# Patient Record
Sex: FEMALE | Race: WHITE | NOT HISPANIC OR LATINO | Employment: FULL TIME | URBAN - METROPOLITAN AREA
[De-identification: names, ages, dates, MRNs, and addresses within clinical notes are randomized per-mention and may not be internally consistent; named-entity substitution may affect disease eponyms.]

---

## 2018-11-05 ENCOUNTER — APPOINTMENT (OUTPATIENT)
Dept: CT IMAGING | Facility: HOSPITAL | Age: 28
End: 2018-11-05

## 2018-11-05 ENCOUNTER — HOSPITAL ENCOUNTER (INPATIENT)
Facility: HOSPITAL | Age: 28
LOS: 1 days | Discharge: HOME | End: 2018-11-07
Attending: EMERGENCY MEDICINE | Admitting: INTERNAL MEDICINE

## 2018-11-05 DIAGNOSIS — J18.9 PNEUMONIA OF BOTH LUNGS DUE TO INFECTIOUS ORGANISM, UNSPECIFIED PART OF LUNG: Primary | ICD-10-CM

## 2018-11-05 DIAGNOSIS — S20.219A CONTUSION OF CHEST WALL, UNSPECIFIED LATERALITY, INITIAL ENCOUNTER: ICD-10-CM

## 2018-11-05 PROCEDURE — 93010 ELECTROCARDIOGRAM REPORT: CPT | Performed by: INTERNAL MEDICINE

## 2018-11-05 PROCEDURE — 93005 ELECTROCARDIOGRAM TRACING: CPT | Performed by: PHYSICIAN ASSISTANT

## 2018-11-05 PROCEDURE — 70450 CT HEAD/BRAIN W/O DYE: CPT

## 2018-11-05 PROCEDURE — 99285 EMERGENCY DEPT VISIT HI MDM: CPT

## 2018-11-05 RX ORDER — CYCLOBENZAPRINE HCL 5 MG
5 TABLET ORAL 3 TIMES DAILY PRN
COMMUNITY
End: 2019-04-18

## 2018-11-05 RX ORDER — PREDNISONE 1 MG/1
5 TABLET ORAL DAILY
COMMUNITY
End: 2021-04-09 | Stop reason: HOSPADM

## 2018-11-05 RX ORDER — CLONAZEPAM 0.5 MG/1
0.5 TABLET ORAL 2 TIMES DAILY PRN
COMMUNITY
End: 2022-01-26

## 2018-11-05 RX ORDER — ACETAMINOPHEN 500 MG
1000 TABLET ORAL ONCE
Status: COMPLETED | OUTPATIENT
Start: 2018-11-05 | End: 2018-11-05

## 2018-11-05 RX ORDER — LEVOMEFOLATE/ALGAL OIL 7.5-90.314
1 CAPSULE ORAL DAILY
COMMUNITY

## 2018-11-05 RX ORDER — DESVENLAFAXINE 100 MG/1
100 TABLET, EXTENDED RELEASE ORAL DAILY
COMMUNITY
End: 2022-01-26

## 2018-11-05 RX ORDER — ONDANSETRON 4 MG/1
4 TABLET, ORALLY DISINTEGRATING ORAL ONCE
Status: COMPLETED | OUTPATIENT
Start: 2018-11-05 | End: 2018-11-05

## 2018-11-05 RX ADMIN — ACETAMINOPHEN 1000 MG: 500 TABLET, FILM COATED ORAL at 22:59

## 2018-11-05 RX ADMIN — ONDANSETRON 4 MG: 4 TABLET, ORALLY DISINTEGRATING ORAL at 22:59

## 2018-11-06 ENCOUNTER — APPOINTMENT (OUTPATIENT)
Dept: CT IMAGING | Facility: HOSPITAL | Age: 28
End: 2018-11-06

## 2018-11-06 PROBLEM — G43.009 MIGRAINE WITHOUT AURA: Status: ACTIVE | Noted: 2018-11-06

## 2018-11-06 PROBLEM — E66.01 CLASS 3 SEVERE OBESITY DUE TO EXCESS CALORIES IN ADULT (HCC): Status: ACTIVE | Noted: 2018-11-06

## 2018-11-06 PROBLEM — J18.9 PNEUMONIA OF BOTH LUNGS DUE TO INFECTIOUS ORGANISM: Status: ACTIVE | Noted: 2018-11-06

## 2018-11-06 PROBLEM — J45.909 ASTHMA: Status: ACTIVE | Noted: 2018-11-06

## 2018-11-06 LAB
ALBUMIN SERPL-MCNC: 3.6 G/DL (ref 3.5–5.2)
ALBUMIN/GLOB SERPL: 1.2 G/DL
ALP SERPL-CCNC: 107 U/L (ref 39–117)
ALT SERPL W P-5'-P-CCNC: 17 U/L (ref 1–33)
ANION GAP SERPL CALCULATED.3IONS-SCNC: 11.4 MMOL/L
AST SERPL-CCNC: 11 U/L (ref 1–32)
B PERT DNA SPEC QL NAA+PROBE: NOT DETECTED
BASOPHILS # BLD AUTO: 0.03 10*3/MM3 (ref 0–0.2)
BASOPHILS NFR BLD AUTO: 0.2 % (ref 0–1.5)
BILIRUB SERPL-MCNC: 0.3 MG/DL (ref 0.1–1.2)
BUN BLD-MCNC: 11 MG/DL (ref 6–20)
BUN/CREAT SERPL: 16.9 (ref 7–25)
C PNEUM DNA NPH QL NAA+NON-PROBE: NOT DETECTED
CALCIUM SPEC-SCNC: 9.5 MG/DL (ref 8.6–10.5)
CHLORIDE SERPL-SCNC: 106 MMOL/L (ref 98–107)
CO2 SERPL-SCNC: 22.6 MMOL/L (ref 22–29)
CREAT BLD-MCNC: 0.65 MG/DL (ref 0.57–1)
D-LACTATE SERPL-SCNC: 1 MMOL/L (ref 0.5–2)
DEPRECATED RDW RBC AUTO: 44.1 FL (ref 37–54)
EOSINOPHIL # BLD AUTO: 0.4 10*3/MM3 (ref 0–0.7)
EOSINOPHIL NFR BLD AUTO: 2.4 % (ref 0.3–6.2)
ERYTHROCYTE [DISTWIDTH] IN BLOOD BY AUTOMATED COUNT: 15.6 % (ref 11.7–13)
FLUAV H1 2009 PAND RNA NPH QL NAA+PROBE: NOT DETECTED
FLUAV H1 HA GENE NPH QL NAA+PROBE: NOT DETECTED
FLUAV H3 RNA NPH QL NAA+PROBE: NOT DETECTED
FLUAV SUBTYP SPEC NAA+PROBE: NOT DETECTED
FLUBV RNA ISLT QL NAA+PROBE: NOT DETECTED
GFR SERPL CREATININE-BSD FRML MDRD: 110 ML/MIN/1.73
GLOBULIN UR ELPH-MCNC: 3.1 GM/DL
GLUCOSE BLD-MCNC: 96 MG/DL (ref 65–99)
GLUCOSE BLDC GLUCOMTR-MCNC: 113 MG/DL (ref 70–130)
HADV DNA SPEC NAA+PROBE: NOT DETECTED
HCOV 229E RNA SPEC QL NAA+PROBE: NOT DETECTED
HCOV HKU1 RNA SPEC QL NAA+PROBE: NOT DETECTED
HCOV NL63 RNA SPEC QL NAA+PROBE: NOT DETECTED
HCOV OC43 RNA SPEC QL NAA+PROBE: NOT DETECTED
HCT VFR BLD AUTO: 38.3 % (ref 35.6–45.5)
HGB BLD-MCNC: 12.3 G/DL (ref 11.9–15.5)
HMPV RNA NPH QL NAA+NON-PROBE: NOT DETECTED
HPIV1 RNA SPEC QL NAA+PROBE: NOT DETECTED
HPIV2 RNA SPEC QL NAA+PROBE: NOT DETECTED
HPIV3 RNA NPH QL NAA+PROBE: NOT DETECTED
HPIV4 P GENE NPH QL NAA+PROBE: NOT DETECTED
IMM GRANULOCYTES # BLD: 0.11 10*3/MM3 (ref 0–0.03)
IMM GRANULOCYTES NFR BLD: 0.7 % (ref 0–0.5)
L PNEUMO1 AG UR QL IA: NEGATIVE
LYMPHOCYTES # BLD AUTO: 4.76 10*3/MM3 (ref 0.9–4.8)
LYMPHOCYTES NFR BLD AUTO: 29.1 % (ref 19.6–45.3)
M PNEUMO IGG SER IA-ACNC: NOT DETECTED
MCH RBC QN AUTO: 24.9 PG (ref 26.9–32)
MCHC RBC AUTO-ENTMCNC: 32.1 G/DL (ref 32.4–36.3)
MCV RBC AUTO: 77.7 FL (ref 80.5–98.2)
MONOCYTES # BLD AUTO: 0.78 10*3/MM3 (ref 0.2–1.2)
MONOCYTES NFR BLD AUTO: 4.8 % (ref 5–12)
NEUTROPHILS # BLD AUTO: 10.38 10*3/MM3 (ref 1.9–8.1)
NEUTROPHILS NFR BLD AUTO: 63.5 % (ref 42.7–76)
PLATELET # BLD AUTO: 305 10*3/MM3 (ref 140–500)
PMV BLD AUTO: 8.5 FL (ref 6–12)
POTASSIUM BLD-SCNC: 4.2 MMOL/L (ref 3.5–5.2)
PROT SERPL-MCNC: 6.7 G/DL (ref 6–8.5)
RBC # BLD AUTO: 4.93 10*6/MM3 (ref 3.9–5.2)
RHINOVIRUS RNA SPEC NAA+PROBE: NOT DETECTED
RSV RNA NPH QL NAA+NON-PROBE: NOT DETECTED
S PNEUM AG SPEC QL LA: NEGATIVE
SODIUM BLD-SCNC: 140 MMOL/L (ref 136–145)
WBC NRBC COR # BLD: 16.35 10*3/MM3 (ref 4.5–10.7)

## 2018-11-06 PROCEDURE — 25010000002 ONDANSETRON PER 1 MG: Performed by: EMERGENCY MEDICINE

## 2018-11-06 PROCEDURE — 87633 RESP VIRUS 12-25 TARGETS: CPT | Performed by: NURSE PRACTITIONER

## 2018-11-06 PROCEDURE — 87899 AGENT NOS ASSAY W/OPTIC: CPT | Performed by: NURSE PRACTITIONER

## 2018-11-06 PROCEDURE — 87486 CHLMYD PNEUM DNA AMP PROBE: CPT | Performed by: NURSE PRACTITIONER

## 2018-11-06 PROCEDURE — 85025 COMPLETE CBC W/AUTO DIFF WBC: CPT | Performed by: EMERGENCY MEDICINE

## 2018-11-06 PROCEDURE — 25010000002 LEVOFLOXACIN PER 250 MG: Performed by: EMERGENCY MEDICINE

## 2018-11-06 PROCEDURE — 83605 ASSAY OF LACTIC ACID: CPT | Performed by: EMERGENCY MEDICINE

## 2018-11-06 PROCEDURE — 94799 UNLISTED PULMONARY SVC/PX: CPT

## 2018-11-06 PROCEDURE — 87581 M.PNEUMON DNA AMP PROBE: CPT | Performed by: NURSE PRACTITIONER

## 2018-11-06 PROCEDURE — 25010000002 HYDROMORPHONE PER 4 MG: Performed by: EMERGENCY MEDICINE

## 2018-11-06 PROCEDURE — 87798 DETECT AGENT NOS DNA AMP: CPT | Performed by: NURSE PRACTITIONER

## 2018-11-06 PROCEDURE — 71250 CT THORAX DX C-: CPT

## 2018-11-06 PROCEDURE — 87205 SMEAR GRAM STAIN: CPT | Performed by: NURSE PRACTITIONER

## 2018-11-06 PROCEDURE — 87040 BLOOD CULTURE FOR BACTERIA: CPT | Performed by: EMERGENCY MEDICINE

## 2018-11-06 PROCEDURE — 80053 COMPREHEN METABOLIC PANEL: CPT | Performed by: EMERGENCY MEDICINE

## 2018-11-06 PROCEDURE — 25010000002 ONDANSETRON PER 1 MG: Performed by: NURSE PRACTITIONER

## 2018-11-06 PROCEDURE — 87070 CULTURE OTHR SPECIMN AEROBIC: CPT | Performed by: NURSE PRACTITIONER

## 2018-11-06 PROCEDURE — 63710000001 PREDNISONE PER 5 MG: Performed by: INTERNAL MEDICINE

## 2018-11-06 PROCEDURE — 94640 AIRWAY INHALATION TREATMENT: CPT

## 2018-11-06 PROCEDURE — 82962 GLUCOSE BLOOD TEST: CPT

## 2018-11-06 RX ORDER — IPRATROPIUM BROMIDE AND ALBUTEROL SULFATE 2.5; .5 MG/3ML; MG/3ML
3 SOLUTION RESPIRATORY (INHALATION) EVERY 4 HOURS PRN
Status: DISCONTINUED | OUTPATIENT
Start: 2018-11-06 | End: 2018-11-07 | Stop reason: HOSPADM

## 2018-11-06 RX ORDER — MONTELUKAST SODIUM 10 MG/1
10 TABLET ORAL NIGHTLY
Status: DISCONTINUED | OUTPATIENT
Start: 2018-11-06 | End: 2018-11-07 | Stop reason: HOSPADM

## 2018-11-06 RX ORDER — NITROGLYCERIN 0.4 MG/1
0.4 TABLET SUBLINGUAL
Status: DISCONTINUED | OUTPATIENT
Start: 2018-11-06 | End: 2018-11-07 | Stop reason: HOSPADM

## 2018-11-06 RX ORDER — HYDROMORPHONE HYDROCHLORIDE 1 MG/ML
0.5 INJECTION, SOLUTION INTRAMUSCULAR; INTRAVENOUS; SUBCUTANEOUS ONCE
Status: COMPLETED | OUTPATIENT
Start: 2018-11-06 | End: 2018-11-06

## 2018-11-06 RX ORDER — ONDANSETRON 2 MG/ML
4 INJECTION INTRAMUSCULAR; INTRAVENOUS ONCE
Status: COMPLETED | OUTPATIENT
Start: 2018-11-06 | End: 2018-11-06

## 2018-11-06 RX ORDER — PREDNISONE 1 MG/1
5 TABLET ORAL DAILY
Status: DISCONTINUED | OUTPATIENT
Start: 2018-11-06 | End: 2018-11-07 | Stop reason: HOSPADM

## 2018-11-06 RX ORDER — CYCLOBENZAPRINE HCL 10 MG
5 TABLET ORAL 3 TIMES DAILY PRN
Status: DISCONTINUED | OUTPATIENT
Start: 2018-11-06 | End: 2018-11-07 | Stop reason: HOSPADM

## 2018-11-06 RX ORDER — DESVENLAFAXINE SUCCINATE 50 MG/1
100 TABLET, EXTENDED RELEASE ORAL DAILY
Status: DISCONTINUED | OUTPATIENT
Start: 2018-11-06 | End: 2018-11-07 | Stop reason: HOSPADM

## 2018-11-06 RX ORDER — BUTALBITAL, ACETAMINOPHEN AND CAFFEINE 50; 325; 40 MG/1; MG/1; MG/1
1 TABLET ORAL EVERY 4 HOURS PRN
Status: DISCONTINUED | OUTPATIENT
Start: 2018-11-06 | End: 2018-11-07 | Stop reason: HOSPADM

## 2018-11-06 RX ORDER — SODIUM CHLORIDE 0.9 % (FLUSH) 0.9 %
1-10 SYRINGE (ML) INJECTION AS NEEDED
Status: DISCONTINUED | OUTPATIENT
Start: 2018-11-06 | End: 2018-11-07 | Stop reason: HOSPADM

## 2018-11-06 RX ORDER — CLONAZEPAM 0.5 MG/1
0.5 TABLET ORAL 2 TIMES DAILY PRN
Status: DISCONTINUED | OUTPATIENT
Start: 2018-11-06 | End: 2018-11-07 | Stop reason: HOSPADM

## 2018-11-06 RX ORDER — ACETAMINOPHEN 325 MG/1
650 TABLET ORAL EVERY 4 HOURS PRN
Status: DISCONTINUED | OUTPATIENT
Start: 2018-11-06 | End: 2018-11-07 | Stop reason: HOSPADM

## 2018-11-06 RX ORDER — ONDANSETRON 2 MG/ML
4 INJECTION INTRAMUSCULAR; INTRAVENOUS EVERY 6 HOURS PRN
Status: DISCONTINUED | OUTPATIENT
Start: 2018-11-06 | End: 2018-11-07 | Stop reason: HOSPADM

## 2018-11-06 RX ORDER — SODIUM CHLORIDE 0.9 % (FLUSH) 0.9 %
3 SYRINGE (ML) INJECTION EVERY 12 HOURS SCHEDULED
Status: DISCONTINUED | OUTPATIENT
Start: 2018-11-06 | End: 2018-11-07 | Stop reason: HOSPADM

## 2018-11-06 RX ORDER — ONDANSETRON 4 MG/1
4 TABLET, FILM COATED ORAL EVERY 6 HOURS PRN
Status: DISCONTINUED | OUTPATIENT
Start: 2018-11-06 | End: 2018-11-07 | Stop reason: HOSPADM

## 2018-11-06 RX ORDER — BUDESONIDE AND FORMOTEROL FUMARATE DIHYDRATE 160; 4.5 UG/1; UG/1
2 AEROSOL RESPIRATORY (INHALATION)
Status: DISCONTINUED | OUTPATIENT
Start: 2018-11-06 | End: 2018-11-07 | Stop reason: HOSPADM

## 2018-11-06 RX ORDER — ONDANSETRON 4 MG/1
4 TABLET, ORALLY DISINTEGRATING ORAL EVERY 6 HOURS PRN
Status: DISCONTINUED | OUTPATIENT
Start: 2018-11-06 | End: 2018-11-07 | Stop reason: HOSPADM

## 2018-11-06 RX ORDER — LEVOFLOXACIN 5 MG/ML
750 INJECTION, SOLUTION INTRAVENOUS EVERY 24 HOURS
Status: DISCONTINUED | OUTPATIENT
Start: 2018-11-07 | End: 2018-11-07 | Stop reason: HOSPADM

## 2018-11-06 RX ORDER — ALBUTEROL SULFATE 2.5 MG/3ML
2.5 SOLUTION RESPIRATORY (INHALATION) EVERY 6 HOURS PRN
Status: DISCONTINUED | OUTPATIENT
Start: 2018-11-06 | End: 2018-11-07 | Stop reason: HOSPADM

## 2018-11-06 RX ORDER — IPRATROPIUM BROMIDE AND ALBUTEROL SULFATE 2.5; .5 MG/3ML; MG/3ML
3 SOLUTION RESPIRATORY (INHALATION) ONCE
Status: COMPLETED | OUTPATIENT
Start: 2018-11-06 | End: 2018-11-06

## 2018-11-06 RX ORDER — LEVOFLOXACIN 5 MG/ML
750 INJECTION, SOLUTION INTRAVENOUS ONCE
Status: COMPLETED | OUTPATIENT
Start: 2018-11-06 | End: 2018-11-06

## 2018-11-06 RX ADMIN — BUTALBITAL, ACETAMINOPHEN, AND CAFFEINE 1 TABLET: 50; 325; 40 TABLET ORAL at 20:28

## 2018-11-06 RX ADMIN — MONTELUKAST SODIUM 10 MG: 10 TABLET, FILM COATED ORAL at 20:28

## 2018-11-06 RX ADMIN — IPRATROPIUM BROMIDE AND ALBUTEROL SULFATE 3 ML: 2.5; .5 SOLUTION RESPIRATORY (INHALATION) at 15:55

## 2018-11-06 RX ADMIN — BUTALBITAL, ACETAMINOPHEN, AND CAFFEINE 1 TABLET: 50; 325; 40 TABLET ORAL at 10:58

## 2018-11-06 RX ADMIN — Medication 3 ML: at 10:08

## 2018-11-06 RX ADMIN — BUDESONIDE AND FORMOTEROL FUMARATE DIHYDRATE 2 PUFF: 160; 4.5 AEROSOL RESPIRATORY (INHALATION) at 20:17

## 2018-11-06 RX ADMIN — CLONAZEPAM 0.5 MG: 0.5 TABLET ORAL at 20:28

## 2018-11-06 RX ADMIN — BUTALBITAL, ACETAMINOPHEN, AND CAFFEINE 1 TABLET: 50; 325; 40 TABLET ORAL at 15:11

## 2018-11-06 RX ADMIN — IPRATROPIUM BROMIDE AND ALBUTEROL SULFATE 3 ML: .5; 3 SOLUTION RESPIRATORY (INHALATION) at 01:24

## 2018-11-06 RX ADMIN — LEVOFLOXACIN 750 MG: 5 INJECTION, SOLUTION INTRAVENOUS at 02:05

## 2018-11-06 RX ADMIN — DESVENLAFAXINE SUCCINATE 100 MG: 50 TABLET, EXTENDED RELEASE ORAL at 16:13

## 2018-11-06 RX ADMIN — IPRATROPIUM BROMIDE AND ALBUTEROL SULFATE 3 ML: 2.5; .5 SOLUTION RESPIRATORY (INHALATION) at 20:17

## 2018-11-06 RX ADMIN — ACETAMINOPHEN 650 MG: 325 TABLET, FILM COATED ORAL at 07:35

## 2018-11-06 RX ADMIN — PREDNISONE 5 MG: 5 TABLET ORAL at 16:13

## 2018-11-06 RX ADMIN — ONDANSETRON 4 MG: 2 INJECTION INTRAMUSCULAR; INTRAVENOUS at 20:41

## 2018-11-06 RX ADMIN — HYDROMORPHONE HYDROCHLORIDE 0.5 MG: 1 INJECTION, SOLUTION INTRAMUSCULAR; INTRAVENOUS; SUBCUTANEOUS at 03:24

## 2018-11-06 RX ADMIN — HYDROMORPHONE HYDROCHLORIDE 0.5 MG: 1 INJECTION, SOLUTION INTRAMUSCULAR; INTRAVENOUS; SUBCUTANEOUS at 01:37

## 2018-11-06 RX ADMIN — Medication 3 ML: at 23:07

## 2018-11-06 RX ADMIN — ONDANSETRON 4 MG: 2 INJECTION INTRAMUSCULAR; INTRAVENOUS at 10:25

## 2018-11-06 RX ADMIN — ONDANSETRON 4 MG: 2 INJECTION INTRAMUSCULAR; INTRAVENOUS at 01:37

## 2018-11-07 VITALS
RESPIRATION RATE: 20 BRPM | HEART RATE: 89 BPM | HEIGHT: 67 IN | WEIGHT: 293 LBS | TEMPERATURE: 98.7 F | SYSTOLIC BLOOD PRESSURE: 119 MMHG | BODY MASS INDEX: 45.99 KG/M2 | OXYGEN SATURATION: 97 % | DIASTOLIC BLOOD PRESSURE: 72 MMHG

## 2018-11-07 LAB
ANION GAP SERPL CALCULATED.3IONS-SCNC: 8.8 MMOL/L
BASOPHILS # BLD AUTO: 0.05 10*3/MM3 (ref 0–0.2)
BASOPHILS NFR BLD AUTO: 0.3 % (ref 0–1.5)
BUN BLD-MCNC: 10 MG/DL (ref 6–20)
BUN/CREAT SERPL: 17.2 (ref 7–25)
CALCIUM SPEC-SCNC: 9 MG/DL (ref 8.6–10.5)
CHLORIDE SERPL-SCNC: 106 MMOL/L (ref 98–107)
CO2 SERPL-SCNC: 24.2 MMOL/L (ref 22–29)
CREAT BLD-MCNC: 0.58 MG/DL (ref 0.57–1)
DEPRECATED RDW RBC AUTO: 46.7 FL (ref 37–54)
EOSINOPHIL # BLD AUTO: 0.35 10*3/MM3 (ref 0–0.7)
EOSINOPHIL NFR BLD AUTO: 2.4 % (ref 0.3–6.2)
ERYTHROCYTE [DISTWIDTH] IN BLOOD BY AUTOMATED COUNT: 15.5 % (ref 11.7–13)
GFR SERPL CREATININE-BSD FRML MDRD: 126 ML/MIN/1.73
GLUCOSE BLD-MCNC: 87 MG/DL (ref 65–99)
HCT VFR BLD AUTO: 40.8 % (ref 35.6–45.5)
HGB BLD-MCNC: 11.9 G/DL (ref 11.9–15.5)
IMM GRANULOCYTES # BLD: 0.06 10*3/MM3 (ref 0–0.03)
IMM GRANULOCYTES NFR BLD: 0.4 % (ref 0–0.5)
LYMPHOCYTES # BLD AUTO: 3.53 10*3/MM3 (ref 0.9–4.8)
LYMPHOCYTES NFR BLD AUTO: 24.2 % (ref 19.6–45.3)
MCH RBC QN AUTO: 24.2 PG (ref 26.9–32)
MCHC RBC AUTO-ENTMCNC: 29.2 G/DL (ref 32.4–36.3)
MCV RBC AUTO: 83.1 FL (ref 80.5–98.2)
MONOCYTES # BLD AUTO: 0.97 10*3/MM3 (ref 0.2–1.2)
MONOCYTES NFR BLD AUTO: 6.6 % (ref 5–12)
NEUTROPHILS # BLD AUTO: 9.65 10*3/MM3 (ref 1.9–8.1)
NEUTROPHILS NFR BLD AUTO: 66.1 % (ref 42.7–76)
PLATELET # BLD AUTO: 293 10*3/MM3 (ref 140–500)
PMV BLD AUTO: 8.8 FL (ref 6–12)
POTASSIUM BLD-SCNC: 4.3 MMOL/L (ref 3.5–5.2)
RBC # BLD AUTO: 4.91 10*6/MM3 (ref 3.9–5.2)
SODIUM BLD-SCNC: 139 MMOL/L (ref 136–145)
WBC NRBC COR # BLD: 14.61 10*3/MM3 (ref 4.5–10.7)

## 2018-11-07 PROCEDURE — 80048 BASIC METABOLIC PNL TOTAL CA: CPT | Performed by: INTERNAL MEDICINE

## 2018-11-07 PROCEDURE — 85025 COMPLETE CBC W/AUTO DIFF WBC: CPT | Performed by: INTERNAL MEDICINE

## 2018-11-07 PROCEDURE — G0009 ADMIN PNEUMOCOCCAL VACCINE: HCPCS | Performed by: INTERNAL MEDICINE

## 2018-11-07 PROCEDURE — 25010000002 PNEUMOCOCCAL VAC POLYVALENT PER 0.5 ML: Performed by: INTERNAL MEDICINE

## 2018-11-07 PROCEDURE — 90661 CCIIV3 VAC ABX FR 0.5 ML IM: CPT | Performed by: INTERNAL MEDICINE

## 2018-11-07 PROCEDURE — 63710000001 PREDNISONE PER 5 MG: Performed by: INTERNAL MEDICINE

## 2018-11-07 PROCEDURE — 94799 UNLISTED PULMONARY SVC/PX: CPT

## 2018-11-07 PROCEDURE — 25010000002 INFLUENZA VAC SUBUNIT QUAD 0.5 ML SUSPENSION PREFILLED SYRINGE: Performed by: INTERNAL MEDICINE

## 2018-11-07 PROCEDURE — 90732 PPSV23 VACC 2 YRS+ SUBQ/IM: CPT | Performed by: INTERNAL MEDICINE

## 2018-11-07 PROCEDURE — 25010000002 LEVOFLOXACIN PER 250 MG: Performed by: NURSE PRACTITIONER

## 2018-11-07 PROCEDURE — G0008 ADMIN INFLUENZA VIRUS VAC: HCPCS | Performed by: INTERNAL MEDICINE

## 2018-11-07 RX ORDER — BUTALBITAL, ACETAMINOPHEN AND CAFFEINE 50; 325; 40 MG/1; MG/1; MG/1
1 TABLET ORAL EVERY 6 HOURS PRN
Qty: 10 TABLET | Refills: 0 | Status: SHIPPED | OUTPATIENT
Start: 2018-11-07 | End: 2021-12-20

## 2018-11-07 RX ORDER — LEVOFLOXACIN 750 MG/1
750 TABLET ORAL DAILY
Qty: 7 TABLET | Refills: 0 | Status: SHIPPED | OUTPATIENT
Start: 2018-11-07 | End: 2021-04-09 | Stop reason: HOSPADM

## 2018-11-07 RX ADMIN — BUTALBITAL, ACETAMINOPHEN, AND CAFFEINE 1 TABLET: 50; 325; 40 TABLET ORAL at 08:33

## 2018-11-07 RX ADMIN — PREDNISONE 5 MG: 5 TABLET ORAL at 08:33

## 2018-11-07 RX ADMIN — Medication 3 ML: at 08:45

## 2018-11-07 RX ADMIN — CLONAZEPAM 0.5 MG: 0.5 TABLET ORAL at 08:35

## 2018-11-07 RX ADMIN — LEVOFLOXACIN 750 MG: 5 INJECTION, SOLUTION INTRAVENOUS at 03:42

## 2018-11-07 RX ADMIN — DESVENLAFAXINE SUCCINATE 100 MG: 50 TABLET, EXTENDED RELEASE ORAL at 08:33

## 2018-11-07 RX ADMIN — BUTALBITAL, ACETAMINOPHEN, AND CAFFEINE 1 TABLET: 50; 325; 40 TABLET ORAL at 00:59

## 2018-11-07 RX ADMIN — PNEUMOCOCCAL VACCINE POLYVALENT 0.5 ML
25; 25; 25; 25; 25; 25; 25; 25; 25; 25; 25; 25; 25; 25; 25; 25; 25; 25; 25; 25; 25; 25; 25 INJECTION, SOLUTION INTRAMUSCULAR; SUBCUTANEOUS at 11:24

## 2018-11-07 RX ADMIN — BUDESONIDE AND FORMOTEROL FUMARATE DIHYDRATE 2 PUFF: 160; 4.5 AEROSOL RESPIRATORY (INHALATION) at 09:20

## 2018-11-07 RX ADMIN — CYCLOBENZAPRINE 5 MG: 10 TABLET, FILM COATED ORAL at 00:59

## 2018-11-07 RX ADMIN — INFLUENZA A VIRUS A/SINGAPORE/GP1908/2015 IVR-180 (H1N1) ANTIGEN (MDCK CELL DERIVED, PROPIOLACTONE INACTIVATED), INFLUENZA A VIRUS A/NORTH CAROLINA/04/2016 (H3N2) HEMAGGLUTININ ANTIGEN (MDCK CELL DERIVED, PROPIOLACTONE INACTIVATED), INFLUENZA B VIRUS B/IOWA/06/2017 HEMAGGLUTININ ANTIGEN (MDCK CELL DERIVED, PROPIOLACTONE INACTIVATED), INFLUENZA B VIRUS B/SINGAPORE/INFTT-16-0610/2016 HEMAGGLUTININ ANTIGEN (MDCK CELL DERIVED, PROPIOLACTONE INACTIVATED) 0.5 ML: 15; 15; 15; 15 INJECTION, SUSPENSION INTRAMUSCULAR at 11:23

## 2018-11-08 ENCOUNTER — READMISSION MANAGEMENT (OUTPATIENT)
Dept: CALL CENTER | Facility: HOSPITAL | Age: 28
End: 2018-11-08

## 2018-11-08 LAB
BACTERIA SPEC RESP CULT: NORMAL
GRAM STN SPEC: NORMAL

## 2018-11-09 ENCOUNTER — READMISSION MANAGEMENT (OUTPATIENT)
Dept: CALL CENTER | Facility: HOSPITAL | Age: 28
End: 2018-11-09

## 2018-11-09 NOTE — OUTREACH NOTE
COPD/PN Week 1 Survey      Responses   Facility patient discharged from?  [facility]   Does the patient have one of the following disease processes/diagnoses(primary or secondary)?  COPD/Pneumonia   Is there a successful TCM telephone encounter documented?  No   Was the primary reason for admission:  Pneumonia   Week 1 attempt successful?  Yes   Call start time  1724   Call end time  1726   Discharge diagnosis  Pneumonia  Asthma  Migraine without aura    Meds reviewed with patient/caregiver?  Yes   Is the patient having any side effects they believe may be caused by any medication additions or changes?  No   Does the patient have all medications ordered at discharge?  Yes   Is the patient taking all medications as directed (includes completed medication regime)?  Yes   Does the patient have an appointment with their PCP or pulmonologist within 7 days of discharge?  Yes   Comments regarding PCP  Sees PCP on Moonday 11/13/2018   Has the patient kept scheduled appointments due by today?  N/A   Has home health visited the patient within 72 hours of discharge?  N/A   Psychosocial issues?  No   Comments  Patient reports she is doing well. No SOB, or fever.    Did the patient receive a copy of their discharge instructions?  Yes   Nursing interventions  Reviewed instructions with patient   What is the patient's perception of their health status since discharge?  Improving   Nursing Interventions  Nurse provided patient education   Are the patient's immunizations up to date?   Yes   Nursing interventions  Educated on importance of maintaining up to date immunizations as advised by provider   Is the patient/caregiver able to teach back the hierarchy of who to call/visit for symptoms/problems? PCP, Specialist, Home health nurse, Urgent Care, ED, 911  Yes   Is the patient/caregiver able to teach back signs and symptoms of worsening condition:  Fever/chills, Shortness of breath, Chest pain   Is the patient/caregiver able to  teach back importance of completing antibiotic course of treatment?  Yes   Week 1 call completed?  Yes          Sacha Mitchell RN

## 2018-11-09 NOTE — OUTREACH NOTE
Prep Survey      Responses   Facility patient discharged from?  [facility]   Is patient eligible?  Yes   Discharge diagnosis  Pneumonia  Asthma  Migraine without aura    Does the patient have one of the following disease processes/diagnoses(primary or secondary)?  COPD/Pneumonia   Does the patient have Home health ordered?  No   Is there a DME ordered?  No   Prep survey completed?  Yes          Khloe Clay RN

## 2018-11-11 LAB
BACTERIA SPEC AEROBE CULT: NORMAL
BACTERIA SPEC AEROBE CULT: NORMAL

## 2018-11-17 ENCOUNTER — READMISSION MANAGEMENT (OUTPATIENT)
Dept: CALL CENTER | Facility: HOSPITAL | Age: 28
End: 2018-11-17

## 2019-04-17 ENCOUNTER — HOSPITAL ENCOUNTER (EMERGENCY)
Facility: HOSPITAL | Age: 29
Discharge: HOME | End: 2019-04-18
Attending: EMERGENCY MEDICINE | Admitting: EMERGENCY MEDICINE

## 2019-04-17 ENCOUNTER — APPOINTMENT (OUTPATIENT)
Dept: GENERAL RADIOLOGY | Facility: HOSPITAL | Age: 29
End: 2019-04-17

## 2019-04-17 DIAGNOSIS — M54.41 ACUTE BILATERAL LOW BACK PAIN WITH RIGHT-SIDED SCIATICA: Primary | ICD-10-CM

## 2019-04-17 PROCEDURE — 99282 EMERGENCY DEPT VISIT SF MDM: CPT

## 2019-04-17 PROCEDURE — 72110 X-RAY EXAM L-2 SPINE 4/>VWS: CPT

## 2019-04-18 VITALS
BODY MASS INDEX: 42.38 KG/M2 | OXYGEN SATURATION: 96 % | RESPIRATION RATE: 16 BRPM | HEART RATE: 68 BPM | TEMPERATURE: 98.1 F | DIASTOLIC BLOOD PRESSURE: 84 MMHG | HEIGHT: 67 IN | SYSTOLIC BLOOD PRESSURE: 144 MMHG | WEIGHT: 270 LBS

## 2019-04-18 RX ORDER — HYDROCODONE BITARTRATE AND ACETAMINOPHEN 7.5; 325 MG/1; MG/1
1 TABLET ORAL EVERY 4 HOURS PRN
Qty: 18 TABLET | Refills: 0 | Status: SHIPPED | OUTPATIENT
Start: 2019-04-18 | End: 2021-11-22

## 2019-04-18 RX ORDER — PREDNISONE 50 MG/1
50 TABLET ORAL DAILY
Qty: 5 TABLET | Refills: 0 | Status: SHIPPED | OUTPATIENT
Start: 2019-04-18 | End: 2021-04-09 | Stop reason: HOSPADM

## 2019-04-18 RX ORDER — METAXALONE 800 MG/1
800 TABLET ORAL 3 TIMES DAILY PRN
Qty: 15 TABLET | Refills: 0 | Status: SHIPPED | OUTPATIENT
Start: 2019-04-18 | End: 2022-03-16

## 2021-04-07 ENCOUNTER — APPOINTMENT (OUTPATIENT)
Dept: CT IMAGING | Facility: HOSPITAL | Age: 31
End: 2021-04-07

## 2021-04-07 ENCOUNTER — APPOINTMENT (OUTPATIENT)
Dept: GENERAL RADIOLOGY | Facility: HOSPITAL | Age: 31
End: 2021-04-07

## 2021-04-07 ENCOUNTER — APPOINTMENT (OUTPATIENT)
Dept: MRI IMAGING | Facility: HOSPITAL | Age: 31
End: 2021-04-07

## 2021-04-07 ENCOUNTER — HOSPITAL ENCOUNTER (INPATIENT)
Facility: HOSPITAL | Age: 31
LOS: 2 days | Discharge: HOME | End: 2021-04-09
Attending: EMERGENCY MEDICINE | Admitting: INTERNAL MEDICINE

## 2021-04-07 DIAGNOSIS — R29.898 RIGHT ARM WEAKNESS: ICD-10-CM

## 2021-04-07 DIAGNOSIS — R47.1 DYSARTHRIA: ICD-10-CM

## 2021-04-07 DIAGNOSIS — I63.9 CEREBROVASCULAR ACCIDENT (CVA), UNSPECIFIED MECHANISM (HCC): Primary | ICD-10-CM

## 2021-04-07 DIAGNOSIS — R29.898 RIGHT LEG WEAKNESS: ICD-10-CM

## 2021-04-07 LAB
ABO GROUP BLD: NORMAL
ALBUMIN SERPL-MCNC: 3.6 G/DL (ref 3.5–5.2)
ALBUMIN/GLOB SERPL: 2 G/DL
ALP SERPL-CCNC: 57 U/L (ref 39–117)
ALT SERPL W P-5'-P-CCNC: 15 U/L (ref 1–33)
ANION GAP SERPL CALCULATED.3IONS-SCNC: 6.9 MMOL/L (ref 5–15)
APTT PPP: 27.5 SECONDS (ref 22.7–35.4)
AST SERPL-CCNC: 18 U/L (ref 1–32)
BASOPHILS # BLD AUTO: 0.03 10*3/MM3 (ref 0–0.2)
BASOPHILS NFR BLD AUTO: 0.7 % (ref 0–1.5)
BILIRUB SERPL-MCNC: 0.5 MG/DL (ref 0–1.2)
BLD GP AB SCN SERPL QL: NEGATIVE
BUN SERPL-MCNC: 8 MG/DL (ref 6–20)
BUN/CREAT SERPL: 15.1 (ref 7–25)
CALCIUM SPEC-SCNC: 8 MG/DL (ref 8.6–10.5)
CHLORIDE SERPL-SCNC: 105 MMOL/L (ref 98–107)
CO2 SERPL-SCNC: 24.1 MMOL/L (ref 22–29)
CREAT SERPL-MCNC: 0.53 MG/DL (ref 0.57–1)
CRP SERPL-MCNC: <0.3 MG/DL (ref 0–0.5)
DEPRECATED RDW RBC AUTO: 43.1 FL (ref 37–54)
EOSINOPHIL # BLD AUTO: 0.13 10*3/MM3 (ref 0–0.4)
EOSINOPHIL NFR BLD AUTO: 2.9 % (ref 0.3–6.2)
ERYTHROCYTE [DISTWIDTH] IN BLOOD BY AUTOMATED COUNT: 15 % (ref 12.3–15.4)
ETHANOL BLD-MCNC: <10 MG/DL (ref 0–10)
ETHANOL UR QL: <0.01 %
FOLATE SERPL-MCNC: 17.5 NG/ML (ref 4.78–24.2)
GFR SERPL CREATININE-BSD FRML MDRD: 137 ML/MIN/1.73
GLOBULIN UR ELPH-MCNC: 1.8 GM/DL
GLUCOSE BLDC GLUCOMTR-MCNC: 83 MG/DL (ref 70–130)
GLUCOSE BLDC GLUCOMTR-MCNC: 98 MG/DL (ref 70–130)
GLUCOSE SERPL-MCNC: 94 MG/DL (ref 65–99)
HCG SERPL QL: NEGATIVE
HCT VFR BLD AUTO: 36.5 % (ref 34–46.6)
HCYS SERPL-MCNC: 7.7 UMOL/L (ref 0–15)
HGB BLD-MCNC: 11.6 G/DL (ref 12–15.9)
HOLD SPECIMEN: NORMAL
HOLD SPECIMEN: NORMAL
IMM GRANULOCYTES # BLD AUTO: 0.02 10*3/MM3 (ref 0–0.05)
IMM GRANULOCYTES NFR BLD AUTO: 0.5 % (ref 0–0.5)
INR PPP: 1.02 (ref 0.9–1.1)
LYMPHOCYTES # BLD AUTO: 1.87 10*3/MM3 (ref 0.7–3.1)
LYMPHOCYTES NFR BLD AUTO: 42.1 % (ref 19.6–45.3)
MCH RBC QN AUTO: 25.5 PG (ref 26.6–33)
MCHC RBC AUTO-ENTMCNC: 31.8 G/DL (ref 31.5–35.7)
MCV RBC AUTO: 80.2 FL (ref 79–97)
MONOCYTES # BLD AUTO: 0.29 10*3/MM3 (ref 0.1–0.9)
MONOCYTES NFR BLD AUTO: 6.5 % (ref 5–12)
NEUTROPHILS NFR BLD AUTO: 2.1 10*3/MM3 (ref 1.7–7)
NEUTROPHILS NFR BLD AUTO: 47.3 % (ref 42.7–76)
NRBC BLD AUTO-RTO: 0 /100 WBC (ref 0–0.2)
PLATELET # BLD AUTO: 269 10*3/MM3 (ref 140–450)
PMV BLD AUTO: 9.5 FL (ref 6–12)
POTASSIUM SERPL-SCNC: 4.1 MMOL/L (ref 3.5–5.2)
PROT SERPL-MCNC: 5.4 G/DL (ref 6–8.5)
PROTHROMBIN TIME: 13.2 SECONDS (ref 11.7–14.2)
RBC # BLD AUTO: 4.55 10*6/MM3 (ref 3.77–5.28)
RH BLD: POSITIVE
SARS-COV-2 RNA RESP QL NAA+PROBE: DETECTED
SODIUM SERPL-SCNC: 136 MMOL/L (ref 136–145)
T&S EXPIRATION DATE: NORMAL
TROPONIN T SERPL-MCNC: <0.01 NG/ML (ref 0–0.03)
TSH SERPL DL<=0.05 MIU/L-ACNC: 1.29 UIU/ML (ref 0.27–4.2)
VIT B12 BLD-MCNC: 416 PG/ML (ref 211–946)
WBC # BLD AUTO: 4.44 10*3/MM3 (ref 3.4–10.8)
WHOLE BLOOD HOLD SPECIMEN: NORMAL
WHOLE BLOOD HOLD SPECIMEN: NORMAL

## 2021-04-07 PROCEDURE — 86850 RBC ANTIBODY SCREEN: CPT | Performed by: PHYSICIAN ASSISTANT

## 2021-04-07 PROCEDURE — 86140 C-REACTIVE PROTEIN: CPT | Performed by: PSYCHIATRY & NEUROLOGY

## 2021-04-07 PROCEDURE — 99223 1ST HOSP IP/OBS HIGH 75: CPT | Performed by: PSYCHIATRY & NEUROLOGY

## 2021-04-07 PROCEDURE — 82565 ASSAY OF CREATININE: CPT

## 2021-04-07 PROCEDURE — 85025 COMPLETE CBC W/AUTO DIFF WBC: CPT | Performed by: PHYSICIAN ASSISTANT

## 2021-04-07 PROCEDURE — 0042T HC CT CEREBRAL PERFUSION W/WO CONTRAST: CPT

## 2021-04-07 PROCEDURE — 82077 ASSAY SPEC XCP UR&BREATH IA: CPT | Performed by: PHYSICIAN ASSISTANT

## 2021-04-07 PROCEDURE — 70553 MRI BRAIN STEM W/O & W/DYE: CPT

## 2021-04-07 PROCEDURE — U0003 INFECTIOUS AGENT DETECTION BY NUCLEIC ACID (DNA OR RNA); SEVERE ACUTE RESPIRATORY SYNDROME CORONAVIRUS 2 (SARS-COV-2) (CORONAVIRUS DISEASE [COVID-19]), AMPLIFIED PROBE TECHNIQUE, MAKING USE OF HIGH THROUGHPUT TECHNOLOGIES AS DESCRIBED BY CMS-2020-01-R: HCPCS | Performed by: PHYSICIAN ASSISTANT

## 2021-04-07 PROCEDURE — A9577 INJ MULTIHANCE: HCPCS | Performed by: INTERNAL MEDICINE

## 2021-04-07 PROCEDURE — 25010000002 PROCHLORPERAZINE 10 MG/2ML SOLUTION: Performed by: PSYCHIATRY & NEUROLOGY

## 2021-04-07 PROCEDURE — 99285 EMERGENCY DEPT VISIT HI MDM: CPT

## 2021-04-07 PROCEDURE — 84703 CHORIONIC GONADOTROPIN ASSAY: CPT | Performed by: PHYSICIAN ASSISTANT

## 2021-04-07 PROCEDURE — 80053 COMPREHEN METABOLIC PANEL: CPT | Performed by: PHYSICIAN ASSISTANT

## 2021-04-07 PROCEDURE — 93005 ELECTROCARDIOGRAM TRACING: CPT | Performed by: PHYSICIAN ASSISTANT

## 2021-04-07 PROCEDURE — 82962 GLUCOSE BLOOD TEST: CPT

## 2021-04-07 PROCEDURE — 70450 CT HEAD/BRAIN W/O DYE: CPT

## 2021-04-07 PROCEDURE — 0 IOPAMIDOL PER 1 ML: Performed by: EMERGENCY MEDICINE

## 2021-04-07 PROCEDURE — 86900 BLOOD TYPING SEROLOGIC ABO: CPT | Performed by: PHYSICIAN ASSISTANT

## 2021-04-07 PROCEDURE — 71045 X-RAY EXAM CHEST 1 VIEW: CPT

## 2021-04-07 PROCEDURE — 70496 CT ANGIOGRAPHY HEAD: CPT

## 2021-04-07 PROCEDURE — 82607 VITAMIN B-12: CPT | Performed by: PSYCHIATRY & NEUROLOGY

## 2021-04-07 PROCEDURE — 0 GADOBENATE DIMEGLUMINE 529 MG/ML SOLUTION: Performed by: INTERNAL MEDICINE

## 2021-04-07 PROCEDURE — 3E03317 INTRODUCTION OF OTHER THROMBOLYTIC INTO PERIPHERAL VEIN, PERCUTANEOUS APPROACH: ICD-10-PCS | Performed by: INTERNAL MEDICINE

## 2021-04-07 PROCEDURE — 86901 BLOOD TYPING SEROLOGIC RH(D): CPT | Performed by: PHYSICIAN ASSISTANT

## 2021-04-07 PROCEDURE — 25010000002 DIPHENHYDRAMINE PER 50 MG: Performed by: PSYCHIATRY & NEUROLOGY

## 2021-04-07 PROCEDURE — 82746 ASSAY OF FOLIC ACID SERUM: CPT | Performed by: PSYCHIATRY & NEUROLOGY

## 2021-04-07 PROCEDURE — 25010000002 ALTEPLASE PER 1 MG: Performed by: PSYCHIATRY & NEUROLOGY

## 2021-04-07 PROCEDURE — 83090 ASSAY OF HOMOCYSTEINE: CPT | Performed by: PSYCHIATRY & NEUROLOGY

## 2021-04-07 PROCEDURE — 85610 PROTHROMBIN TIME: CPT | Performed by: PHYSICIAN ASSISTANT

## 2021-04-07 PROCEDURE — 93010 ELECTROCARDIOGRAM REPORT: CPT | Performed by: INTERNAL MEDICINE

## 2021-04-07 PROCEDURE — 84443 ASSAY THYROID STIM HORMONE: CPT | Performed by: PSYCHIATRY & NEUROLOGY

## 2021-04-07 PROCEDURE — 70498 CT ANGIOGRAPHY NECK: CPT

## 2021-04-07 PROCEDURE — 85730 THROMBOPLASTIN TIME PARTIAL: CPT | Performed by: PHYSICIAN ASSISTANT

## 2021-04-07 PROCEDURE — 84484 ASSAY OF TROPONIN QUANT: CPT | Performed by: PHYSICIAN ASSISTANT

## 2021-04-07 RX ORDER — ASPIRIN 300 MG/1
300 SUPPOSITORY RECTAL DAILY
Status: DISCONTINUED | OUTPATIENT
Start: 2021-04-08 | End: 2021-04-09 | Stop reason: HOSPADM

## 2021-04-07 RX ORDER — SODIUM CHLORIDE 0.9 % (FLUSH) 0.9 %
10 SYRINGE (ML) INJECTION AS NEEDED
Status: DISCONTINUED | OUTPATIENT
Start: 2021-04-07 | End: 2021-04-09 | Stop reason: HOSPADM

## 2021-04-07 RX ORDER — PROCHLORPERAZINE EDISYLATE 5 MG/ML
10 INJECTION INTRAMUSCULAR; INTRAVENOUS ONCE
Status: COMPLETED | OUTPATIENT
Start: 2021-04-07 | End: 2021-04-07

## 2021-04-07 RX ORDER — DIPHENHYDRAMINE HYDROCHLORIDE 50 MG/ML
25 INJECTION INTRAMUSCULAR; INTRAVENOUS ONCE
Status: COMPLETED | OUTPATIENT
Start: 2021-04-07 | End: 2021-04-07

## 2021-04-07 RX ORDER — SODIUM CHLORIDE 0.9 % (FLUSH) 0.9 %
10 SYRINGE (ML) INJECTION EVERY 12 HOURS SCHEDULED
Status: DISCONTINUED | OUTPATIENT
Start: 2021-04-07 | End: 2021-04-09 | Stop reason: HOSPADM

## 2021-04-07 RX ORDER — FERROUS SULFATE 325(65) MG
325 TABLET ORAL
COMMUNITY

## 2021-04-07 RX ORDER — ASPIRIN 325 MG
325 TABLET ORAL DAILY
Status: DISCONTINUED | OUTPATIENT
Start: 2021-04-08 | End: 2021-04-09 | Stop reason: HOSPADM

## 2021-04-07 RX ORDER — ATORVASTATIN CALCIUM 80 MG/1
80 TABLET, FILM COATED ORAL NIGHTLY
Status: DISCONTINUED | OUTPATIENT
Start: 2021-04-07 | End: 2021-04-09 | Stop reason: HOSPADM

## 2021-04-07 RX ORDER — SODIUM CHLORIDE 9 MG/ML
100 INJECTION, SOLUTION INTRAVENOUS ONCE
Status: COMPLETED | OUTPATIENT
Start: 2021-04-07 | End: 2021-04-08

## 2021-04-07 RX ADMIN — ALTEPLASE 9 MG: KIT at 17:44

## 2021-04-07 RX ADMIN — PROCHLORPERAZINE EDISYLATE 10 MG: 5 INJECTION INTRAMUSCULAR; INTRAVENOUS at 22:15

## 2021-04-07 RX ADMIN — GADOBENATE DIMEGLUMINE 20 ML: 529 INJECTION, SOLUTION INTRAVENOUS at 20:57

## 2021-04-07 RX ADMIN — IOPAMIDOL 150 ML: 755 INJECTION, SOLUTION INTRAVENOUS at 17:29

## 2021-04-07 RX ADMIN — SODIUM CHLORIDE, PRESERVATIVE FREE 10 ML: 5 INJECTION INTRAVENOUS at 17:44

## 2021-04-07 RX ADMIN — DIPHENHYDRAMINE HYDROCHLORIDE 25 MG: 50 INJECTION, SOLUTION INTRAMUSCULAR; INTRAVENOUS at 22:06

## 2021-04-07 RX ADMIN — ALTEPLASE 81 MG: KIT at 17:45

## 2021-04-07 NOTE — ED NOTES
Dr walters states to hold TPA for now. Will revisit TPA possibility after pt gets MRI. Dr ortiz informed pt. Pt has clear speech      Rachell Araya RN  04/07/21 1851       Rachell Araya RN  04/07/21 1908

## 2021-04-07 NOTE — ED NOTES
MRI consents co-signed by myself and Nirmala PADILLA, pt is a/o x 4 but unable to use RH to sign at this time. MRI aware.     MRI notified of screening sheet results. Informed will have to wait for RN to be available to transport with pt.      Rachell Araya RN  04/07/21 4342

## 2021-04-07 NOTE — CONSULTS
Neurology Note    Patient:  Rosana Farnsworth    YOB: 1990    REFERRING PHYSICIAN:  Dr. Poon    CHIEF COMPLAINT:    Numbness, weakness    HISTORY OF PRESENT ILLNESS:   The patient is a 30 y.o. female with h/o prior stroke, MTHFC mutation, not on antiplatelet meds or AC. 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 History of prior stroke is somewhat unclear. Patient relates that she was admitted to [facility] in 2018 with stroke-like symptoms which resolved after two days, work-up was inconclusive but brain scan showed a lesion in the centrum semiovale. She was supposed to follow up with a neurologist but never did. Her mom thinks that she was having left sided symptoms. Today around 1345 she developed sudden onset of right sided numbness and weakness, change in vision. She denies having nausea or a headache. In ED NIHSS 4, /68, T99.4, CT/CTP/CTA essentially negative except for an old lacunar stroke in left centrum semiovale.    Past Medical History:  Past Medical History:   Diagnosis Date   • Asthma        Past Surgical History:  Past Surgical History:   Procedure Laterality Date   •  SECTION     • CHOLECYSTECTOMY         Social History:   Social History     Socioeconomic History   • Marital status: Single     Spouse name: Not on file   • Number of children: Not on file   • Years of education: Not on file   • Highest education level: Not on file   Tobacco Use   • Smoking status: Former Smoker   Substance and Sexual Activity   • Alcohol use: No        Family History:   No family history on file.    Medications Prior to Admission:    Prior to Admission medications    Medication Sig Start Date End Date Taking? Authorizing Provider   albuterol (PROVENTIL HFA;VENTOLIN HFA) 108 (90 BASE) MCG/ACT inhaler Inhale 2 puffs every 4 (four) hours as needed for wheezing. 16   Dustin Palacios MD   butalbital-acetaminophen-caffeine (FIORICET,  ESGIC) -40 MG per tablet Take 1 tablet by mouth Every 6 (Six) Hours As Needed for Headache. 11/7/18   Mukund Souza MD   clonazePAM (KlonoPIN) 0.5 MG tablet Take 0.5 mg by mouth 2 (Two) Times a Day As Needed for Seizures.    Jc Lawrence MD   desvenlafaxine (PRISTIQ) 100 MG 24 hr tablet Take 100 mg by mouth Daily.    Jc Lawrence MD   HYDROcodone-acetaminophen (NORCO) 7.5-325 MG per tablet Take 1 tablet by mouth Every 4 (Four) Hours As Needed for Moderate Pain . 4/18/19   Dustin Palacios MD   L-Methylfolate-Algae 7.5-90.314 MG capsule Take 1 tablet by mouth Daily.    Jc Lawrence MD   levoFLOXacin (LEVAQUIN) 750 MG tablet Take 1 tablet by mouth Daily. 11/7/18   Mukund Souza MD   metaxalone (SKELAXIN) 800 MG tablet Take 1 tablet by mouth 3 (Three) Times a Day As Needed for Muscle Spasms. 4/18/19   Dustin Palacios MD   predniSONE (DELTASONE) 5 MG tablet Take 5 mg by mouth Daily.    Jc Lawrence MD   predniSONE (DELTASONE) 50 MG tablet Take 1 tablet by mouth Daily. 4/18/19   Dustin Palacios MD   Prenatal Vit-Fe Fumarate-FA (PRENATAL, CLASSIC, VITAMIN) 28-0.8 MG tablet tablet Take  by mouth daily.    Jc Lawrence MD       Allergies:  Amoxicillin, Morphine, Other, Penicillins, and Sulfa antibiotics      Review of system  Review of Systems   Eyes: Positive for visual disturbance.   Neurological: Positive for weakness and numbness.   All other systems reviewed and are negative.      Vitals:    04/07/21 1658   BP: 123/68   Pulse: 81   Resp: 18   Temp:        Physical exam  Physical Exam  Constitutional:       Appearance: She is well-developed.   HENT:      Head: Normocephalic and atraumatic.   Eyes:      Extraocular Movements: Extraocular movements intact.      Pupils: Pupils are equal, round, and reactive to light.   Cardiovascular:      Rate and Rhythm: Normal rate and regular rhythm.   Pulmonary:      Effort: Pulmonary effort is normal.    Neurological:      Mental Status: She is alert and oriented to person, place, and time.      Deep Tendon Reflexes: Reflexes are normal and symmetric. Babinski sign absent on the right side. Babinski sign absent on the left side.      Comments: Speech clear, mild right facial droop, decreased finger counting on the right, right arm and leg drift, decreased sensation to touch and cold on the right.   Psychiatric:         Behavior: Behavior normal.         Thought Content: Thought content normal.           Lab Results   Component Value Date    WBC 10.65 09/17/2020    HGB 12.9 09/17/2020    HCT 42.9 09/17/2020    MCV 75.9 (L) 09/17/2020     09/17/2020     Lab Results   Component Value Date    GLUCOSE 87 11/07/2018    BUN 13 09/17/2020    CREATININE 0.70 09/17/2020    EGFRIFNONA 126 11/07/2018    BCR 18.6 09/17/2020    CO2 24 09/17/2020    CALCIUM 9.3 09/17/2020    ALBUMIN 3.9 09/17/2020    LABIL2 1.2 09/17/2020    AST 23 09/17/2020    ALT 21 09/17/2020         Radiological Studies:   CT Head Without Contrast    Result Date: 4/7/2021  CT HEAD WO CONTRAST-  INDICATIONS: Neurologic deficit  TECHNIQUE: Radiation dose reduction techniques were utilized, including automated exposure control and exposure modulation based on body size. Noncontrast head CT at 1825 hours on 04/07/2021  COMPARISON: Enhanced CT from 04/07/2021 at 1727 hours  FINDINGS:  Residual intravascular contrast material is present, that could somewhat limit assessment for subtle subarachnoid hemorrhage, with no obvious space-occupying intracranial hemorrhage identified.  No midline shift or mass effect. No acute territorial infarct is identified.  Ventricles, cisterns, cerebral sulci are unremarkable for patient age.  Small likely mucous retention cysts in the maxillary sinuses. The visualized paranasal sinuses, orbits, mastoid air cells are otherwise unremarkable.         No significant change identified. Consider MRI for further evaluation.  This  report was finalized on 4/7/2021 6:37 PM by Dr. Bradly Baez M.D.      CT Angiogram Neck    Result Date: 4/7/2021  CT ANGIOGRAM HEAD-, CT CEREBRAL PERFUSION W WO CONTRAST W AI ANALYSIS OF LVO-, CT ANGIOGRAM NECK-  CT ANGIOGRAM HEAD AND NECK AND CT PERFUSION STUDY  CLINICAL HISTORY: Stroke   Radiation dose reduction techniques were utilized, including automated exposure control and exposure modulation based on body size. CT scan of the head was obtained with 3 mm axial images without the use of IV contrast. The patient underwent a CT perfusion study with a dynamic bolus of IV contrast. Standard perfusion maps were constructed. The patient then underwent a CT angiogram of the head and neck with 1 mm axial images following the administration of IV contrast. Sagittal, coronal, and 3-dimensional reconstructed images were obtained.  Percent stenosis was assessed in accordance with NASCET criteria.  FINDINGS:  NONCONTRAST HEAD CT: On the noncontrast head CT, gray-white matter differentiation is preserved, and no acute hemorrhage or hydrocephalus is identified. Small likely mucous retention cyst in right maxillary sinus.  No enhancing lesions of brain.   CT PERFUSION STUDY:  No CBF (under 30%) deficit or perfusion abnormality is demonstrated where the T MAX is greater than 6 seconds. The calculated mismatch volume was 0 cc.  CTA HEAD and neck: The CT angiogram of the head and neck demonstrates no hemodynamically significant focal stenosis, aneurysm, or dissection in the cervical carotid or vertebral arteries, or in the arteries at the base of the brain.          1. No perfusion abnormality to suggest completed or threatened acute infarct. If indicated, MRI could be considered for further evaluation. 2. No arterial cutoff or high-grade arterial stenosis identified. 3. No acute intracranial hemorrhage or hydrocephalus. No enhancing lesions of brain.    Discussed by telephone with [phone], [phone], Rachelle Duff at 1740,  04/07/2021.  This report was finalized on 4/7/2021 5:44 PM by Dr. Bradly Baez M.D.      XR Chest 1 View    Result Date: 4/7/2021  AP CHEST  HISTORY: Acute stroke protocol  COMPARISON: 08/09/2016  FINDINGS: Vague density in the left lung base may reflect atelectasis or infiltrate. Heart size normal. No pneumothorax. No acute bony abnormality.      Vague density in the left base may reflect atelectasis or infiltrate  This report was finalized on 4/7/2021 5:54 PM by Dr. Tyson Hernandez M.D.      CT Angiogram Head    Result Date: 4/7/2021  CT ANGIOGRAM HEAD-, CT CEREBRAL PERFUSION W WO CONTRAST W AI ANALYSIS OF LVO-, CT ANGIOGRAM NECK-  CT ANGIOGRAM HEAD AND NECK AND CT PERFUSION STUDY  CLINICAL HISTORY: Stroke   Radiation dose reduction techniques were utilized, including automated exposure control and exposure modulation based on body size. CT scan of the head was obtained with 3 mm axial images without the use of IV contrast. The patient underwent a CT perfusion study with a dynamic bolus of IV contrast. Standard perfusion maps were constructed. The patient then underwent a CT angiogram of the head and neck with 1 mm axial images following the administration of IV contrast. Sagittal, coronal, and 3-dimensional reconstructed images were obtained.  Percent stenosis was assessed in accordance with NASCET criteria.  FINDINGS:  NONCONTRAST HEAD CT: On the noncontrast head CT, gray-white matter differentiation is preserved, and no acute hemorrhage or hydrocephalus is identified. Small likely mucous retention cyst in right maxillary sinus.  No enhancing lesions of brain.   CT PERFUSION STUDY:  No CBF (under 30%) deficit or perfusion abnormality is demonstrated where the T MAX is greater than 6 seconds. The calculated mismatch volume was 0 cc.  CTA HEAD and neck: The CT angiogram of the head and neck demonstrates no hemodynamically significant focal stenosis, aneurysm, or dissection in the cervical carotid or  vertebral arteries, or in the arteries at the base of the brain.          1. No perfusion abnormality to suggest completed or threatened acute infarct. If indicated, MRI could be considered for further evaluation. 2. No arterial cutoff or high-grade arterial stenosis identified. 3. No acute intracranial hemorrhage or hydrocephalus. No enhancing lesions of brain.    Discussed by telephone with [phone], [phone], Rachelle Duff at 1740, 04/07/2021.  This report was finalized on 4/7/2021 5:44 PM by Dr. Bradly Baez M.D.      CT CEREBRAL PERFUSION W WO CONTRAST W AI ANALYSIS OF LVO    Result Date: 4/7/2021  CT ANGIOGRAM HEAD-, CT CEREBRAL PERFUSION W WO CONTRAST W AI ANALYSIS OF LVO-, CT ANGIOGRAM NECK-  CT ANGIOGRAM HEAD AND NECK AND CT PERFUSION STUDY  CLINICAL HISTORY: Stroke   Radiation dose reduction techniques were utilized, including automated exposure control and exposure modulation based on body size. CT scan of the head was obtained with 3 mm axial images without the use of IV contrast. The patient underwent a CT perfusion study with a dynamic bolus of IV contrast. Standard perfusion maps were constructed. The patient then underwent a CT angiogram of the head and neck with 1 mm axial images following the administration of IV contrast. Sagittal, coronal, and 3-dimensional reconstructed images were obtained.  Percent stenosis was assessed in accordance with NASCET criteria.  FINDINGS:  NONCONTRAST HEAD CT: On the noncontrast head CT, gray-white matter differentiation is preserved, and no acute hemorrhage or hydrocephalus is identified. Small likely mucous retention cyst in right maxillary sinus.  No enhancing lesions of brain.   CT PERFUSION STUDY:  No CBF (under 30%) deficit or perfusion abnormality is demonstrated where the T MAX is greater than 6 seconds. The calculated mismatch volume was 0 cc.  CTA HEAD and neck: The CT angiogram of the head and neck demonstrates no hemodynamically significant focal  stenosis, aneurysm, or dissection in the cervical carotid or vertebral arteries, or in the arteries at the base of the brain.          1. No perfusion abnormality to suggest completed or threatened acute infarct. If indicated, MRI could be considered for further evaluation. 2. No arterial cutoff or high-grade arterial stenosis identified. 3. No acute intracranial hemorrhage or hydrocephalus. No enhancing lesions of brain.    Discussed by telephone with [phone], 1739, Rachelle Duff at 1740, 04/07/2021.  This report was finalized on 4/7/2021 5:44 PM by Dr. Bradly Baez M.D.        During this visit the following were done:  Labs Reviewed [x]    Labs Ordered []    Radiology Reports Reviewed [x]    Radiology Ordered []    EKG, echo, and/or stress test reviewed [x]    EEG results reviewed  []    EEG reviewed and interpreted per myself   []    Discussed case with neurointerventionalist or neuroradiologist []    Referring Provider Records Reviewed []    ER Records Reviewed []    Hospital Records Reviewed []    History Obtained From Family []    Radiological images view and Interpreted per myself [x]    Case Discussed with referring provider []     Decision to obtain and request outside records  []        Assessment and Plan     Recurrent acute stroke, left hemisphere, no LVO.   - IV TPA, risks benefits reviewed, patient consents.   - ICU observation.   - MRI brain.   - TTE.   - b12, folate, homocysteine, TSH, lipid panel A1C.   - Start DAPT if no ICH on 24 hour scan.    Add: patient developed a headache 30 minutes into TPA infusion, which was stopped, stat CT with retained contrast, but can not r/o a small SAH. Will cancel the rest of TPA, repeat CTH tonight, MRI brain in am.    Thanks,              Electronically signed by Fox Chatman MD on 4/7/2021 at 17:08 EDT

## 2021-04-07 NOTE — ED NOTES
Pt was driving began feeling floater to L eye, then whole body went tingly., drove home and called ems, states s/s similar to prior stroke in 2019. Slurred speech, states feels like she has extra teeth in her mouth.     Pt wearing face mask during their stay in ER. This RN wore face googles, mask and gloves while providing patient care.        Rachell Araya, RN  04/07/21 8217

## 2021-04-07 NOTE — ED PROVIDER NOTES
EMERGENCY DEPARTMENT ENCOUNTER    Room Number:  I383/1  Date seen:  2021  Time seen: 17:05 EDT  PCP: Provider, No Known  Historian: Patient    HPI:  Chief complaint: Neuro deficits  A complete HPI/ROS/PMH/PSH/SH/FH are unobtainable due to: None  Context:Rosana Farnsworth is a 30 y.o. female,, who presents to the ED with c/o driving at 3:45 PM today when she developed black floaters to her left eye and right upper extremity and right lower extremity tingling and weakness.  She denies any headache, nausea, vomiting.  She reports after a few minutes the black floaters resolved in the left eye and began seeing the floaters on the right eye.  Associated symptoms include slurred speech.    She reports that she had a history of stroke in 2019 with similar symptoms, you are unable to define the etiology of the strokelike symptoms then.    Patient was placed in face mask in first look. Patient was wearing facemask when I entered the room and throughout our encounter. I wore full protective equipment throughout this patient encounter including a face mask, goggles, and gloves. Hand hygiene was performed before donning protective equipment and after removal when leaving the room.      MEDICAL RECORD REVIEW  Patient was last seen here on 2019 for intermittent low back pain.    ALLERGIES  [place], Nuts, Amoxicillin, Morphine, Nsaids, Other, Penicillins, Sulfa antibiotics, and Insulin lispro    PAST MEDICAL HISTORY  Active Ambulatory Problems     Diagnosis Date Noted   • Pneumonia of both lungs due to infectious organism 2018   • Asthma 2018   • Class 3 severe obesity due to excess calories in adult (CMS/HCC) 2018   • Migraine without aura 2018     Resolved Ambulatory Problems     Diagnosis Date Noted   • No Resolved Ambulatory Problems     Past Medical History:   Diagnosis Date   • Stroke (CMS/[facility])        PAST SURGICAL HISTORY  Past Surgical History:   Procedure Laterality Date   •  SECTION      • CHOLECYSTECTOMY         FAMILY HISTORY  History reviewed. No pertinent family history.    SOCIAL HISTORY  Social History     Socioeconomic History   • Marital status: Single     Spouse name: Not on file   • Number of children: Not on file   • Years of education: Not on file   • Highest education level: Not on file   Tobacco Use   • Smoking status: Former Smoker   Substance and Sexual Activity   • Alcohol use: No       REVIEW OF SYSTEMS  Review of Systems    All systems reviewed and negative except for those discussed in HPI.     PHYSICAL EXAM    ED Triage Vitals   Temp Heart Rate Resp BP SpO2   04/07/21 1656 04/07/21 1658 04/07/21 1658 04/07/21 1658 --   99.4 °F (37.4 °C) 81 18 123/68       Temp src Heart Rate Source Patient Position BP Location FiO2 (%)   04/07/21 1656 04/07/21 1658 -- -- --   Tympanic Monitor        Physical Exam      I have reviewed the triage vital signs and nursing notes.      GENERAL: not distressed; patient is obese  HENT: nares patent  EYES: no scleral icterus; PERRLA  NECK: no ROM limitations  CV: regular rhythm, regular rate  RESPIRATORY: normal effort  ABDOMEN: soft, nontender  : deferred  MUSCULOSKELETAL: no deformity  NEURO: alert,  follows commands.  Patient has slurred speech, right lower extremity 2 out of 5 in strength, right upper extremity 0 out of 5, no facial droop noted; moderate pronator drift to the right upper extremity  SKIN: warm, dry    LAB RESULTS  Recent Results (from the past 24 hour(s))   POC Glucose Once    Collection Time: 04/07/21  5:00 PM    Specimen: Blood   Result Value Ref Range    Glucose 98 70 - 130 mg/dL   Comprehensive Metabolic Panel    Collection Time: 04/07/21  5:36 PM    Specimen: Hand, Left; Blood   Result Value Ref Range    Glucose 94 65 - 99 mg/dL    BUN 8 6 - 20 mg/dL    Creatinine 0.53 (L) 0.57 - 1.00 mg/dL    Sodium 136 136 - 145 mmol/L    Potassium 4.1 3.5 - 5.2 mmol/L    Chloride 105 98 - 107 mmol/L    CO2 24.1 22.0 - 29.0 mmol/L     Calcium 8.0 (L) 8.6 - 10.5 mg/dL    Total Protein 5.4 (L) 6.0 - 8.5 g/dL    Albumin 3.60 3.50 - 5.20 g/dL    ALT (SGPT) 15 1 - 33 U/L    AST (SGOT) 18 1 - 32 U/L    Alkaline Phosphatase 57 39 - 117 U/L    Total Bilirubin 0.5 0.0 - 1.2 mg/dL    eGFR Non African Amer 137 >60 mL/min/1.73    Globulin 1.8 gm/dL    A/G Ratio 2.0 g/dL    BUN/Creatinine Ratio 15.1 7.0 - 25.0    Anion Gap 6.9 5.0 - 15.0 mmol/L   Protime-INR    Collection Time: 04/07/21  5:36 PM    Specimen: Blood   Result Value Ref Range    Protime 13.2 11.7 - 14.2 Seconds    INR 1.02 0.90 - 1.10   aPTT    Collection Time: 04/07/21  5:36 PM    Specimen: Blood   Result Value Ref Range    PTT 27.5 22.7 - 35.4 seconds   Troponin    Collection Time: 04/07/21  5:36 PM    Specimen: Hand, Left; Blood   Result Value Ref Range    Troponin T <0.010 0.000 - 0.030 ng/mL   Type & Screen    Collection Time: 04/07/21  5:36 PM    Specimen: Blood   Result Value Ref Range    ABO Type O     RH type Positive     Antibody Screen Negative     T&S Expiration Date 4/10/2021 11:59:59 PM    Light Blue Top    Collection Time: 04/07/21  5:36 PM   Result Value Ref Range    Extra Tube hold for add-on    Green Top (Gel)    Collection Time: 04/07/21  5:36 PM   Result Value Ref Range    Extra Tube Hold for add-ons.    Lavender Top    Collection Time: 04/07/21  5:36 PM   Result Value Ref Range    Extra Tube hold for add-on    Gold Top - SST    Collection Time: 04/07/21  5:36 PM   Result Value Ref Range    Extra Tube Hold for add-ons.    CBC Auto Differential    Collection Time: 04/07/21  5:36 PM    Specimen: Blood   Result Value Ref Range    WBC 4.44 3.40 - 10.80 10*3/mm3    RBC 4.55 3.77 - 5.28 10*6/mm3    Hemoglobin 11.6 (L) 12.0 - 15.9 g/dL    Hematocrit 36.5 34.0 - 46.6 %    MCV 80.2 79.0 - 97.0 fL    MCH 25.5 (L) 26.6 - 33.0 pg    MCHC 31.8 31.5 - 35.7 g/dL    RDW 15.0 12.3 - 15.4 %    RDW-SD 43.1 37.0 - 54.0 fl    MPV 9.5 6.0 - 12.0 fL    Platelets 269 140 - 450 10*3/mm3    Neutrophil  % 47.3 42.7 - 76.0 %    Lymphocyte % 42.1 19.6 - 45.3 %    Monocyte % 6.5 5.0 - 12.0 %    Eosinophil % 2.9 0.3 - 6.2 %    Basophil % 0.7 0.0 - 1.5 %    Immature Grans % 0.5 0.0 - 0.5 %    Neutrophils, Absolute 2.10 1.70 - 7.00 10*3/mm3    Lymphocytes, Absolute 1.87 0.70 - 3.10 10*3/mm3    Monocytes, Absolute 0.29 0.10 - 0.90 10*3/mm3    Eosinophils, Absolute 0.13 0.00 - 0.40 10*3/mm3    Basophils, Absolute 0.03 0.00 - 0.20 10*3/mm3    Immature Grans, Absolute 0.02 0.00 - 0.05 10*3/mm3    nRBC 0.0 0.0 - 0.2 /100 WBC   Ethanol    Collection Time: 04/07/21  5:36 PM    Specimen: Hand, Left; Blood   Result Value Ref Range    Ethanol <10 0 - 10 mg/dL    Ethanol % <0.010 %   hCG, Serum, Qualitative    Collection Time: 04/07/21  5:36 PM    Specimen: Blood   Result Value Ref Range    HCG Qualitative Negative Negative   C-reactive Protein    Collection Time: 04/07/21  5:36 PM    Specimen: Hand, Left; Blood   Result Value Ref Range    C-Reactive Protein <0.30 0.00 - 0.50 mg/dL   TSH    Collection Time: 04/07/21  5:36 PM    Specimen: Hand, Left; Blood   Result Value Ref Range    TSH 1.290 0.270 - 4.200 uIU/mL   Vitamin B12    Collection Time: 04/07/21  5:36 PM    Specimen: Blood   Result Value Ref Range    Vitamin B-12 416 211 - 946 pg/mL   Folate    Collection Time: 04/07/21  5:36 PM    Specimen: Blood   Result Value Ref Range    Folate 17.50 4.78 - 24.20 ng/mL   Homocysteine    Collection Time: 04/07/21  5:36 PM    Specimen: Hand, Left; Blood   Result Value Ref Range    Homocysteine, Plasma (Quant) 7.7 0.0 - 15.0 umol/L   COVID-19,[facility]SE [facility]HE[facility], NP SWAB IN TRANSPORT MEDIA 8-12 HR TAT - Swab, Nasopharynx    Collection Time: 04/07/21  6:04 PM    Specimen: Nasopharynx; Swab   Result Value Ref Range    COVID19 Detected (C) Not Detected - Ref. Range   ECG 12 Lead    Collection Time: 04/07/21  6:13 PM   Result Value Ref Range    QT Interval 457 ms   POC Glucose Once    Collection Time: 04/07/21 11:42 PM    Specimen: Blood    Result Value Ref Range    Glucose 83 70 - 130 mg/dL         RADIOLOGY RESULTS  MRI Brain With & Without Contrast   Final Result   1. No acute intracranial abnormality, no abnormal enhancement.       This report was finalized on 4/7/2021 9:41 PM by Dr. Susan Reyes M.D.          CT Head Without Contrast   Final Result       No significant change identified. Consider MRI for further evaluation.       This report was finalized on 4/7/2021 6:37 PM by Dr. Bradly Baez M.D.          XR Chest 1 View   Final Result   Vague density in the left base may reflect atelectasis or infiltrate       This report was finalized on 4/7/2021 5:54 PM by Dr. Tyson Hernandez M.D.          CT Angiogram Head   Final Result           1. No perfusion abnormality to suggest completed or threatened acute   infarct. If indicated, MRI could be considered for further evaluation.   2. No arterial cutoff or high-grade arterial stenosis identified.   3. No acute intracranial hemorrhage or hydrocephalus. No enhancing   lesions of brain.               Discussed by telephone with 1727, [id number] at 1740,   04/07/2021.       This report was finalized on 4/7/2021 5:44 PM by Dr. Bradly Baez M.D.          CT Angiogram Neck   Final Result           1. No perfusion abnormality to suggest completed or threatened acute   infarct. If indicated, MRI could be considered for further evaluation.   2. No arterial cutoff or high-grade arterial stenosis identified.   3. No acute intracranial hemorrhage or hydrocephalus. No enhancing   lesions of brain.               Discussed by telephone with 172Niurka Davis Sandra Soriano at 1740,   04/07/2021.       This report was finalized on 4/7/2021 5:44 PM by Dr. Bradly Baez M.D.          CT CEREBRAL PERFUSION W WO CONTRAST W AI ANALYSIS OF LVO   Final Result           1. No perfusion abnormality to suggest completed or threatened acute   infarct. If indicated, MRI could be considered  for further evaluation.   2. No arterial cutoff or high-grade arterial stenosis identified.   3. No acute intracranial hemorrhage or hydrocephalus. No enhancing   lesions of brain.               Discussed by telephone with 1727, 1739, Rachelle Duff at 1740,   04/07/2021.       This report was finalized on 4/7/2021 5:44 PM by Dr. Bradly Baez M.D.          CT Head Without Contrast    (Results Pending)         PROGRESS, DATA ANALYSIS, CONSULTS AND MEDICAL DECISION MAKING  All labs have been independently reviewed by me.  All radiology studies have been reviewed by me and discussed with radiologist dictating the report. Discussion below represents my analysis of pertinent findings related to patient's condition, differential diagnosis, treatment plan and final disposition.     ED Course as of Apr 07 2343   Wed Apr 07, 2021   1702 I discussed with Dr. Wilkerson, stroke neurologist regarding patient.  Team D is called at this time.    [SS]   1708 NIH scale of 4.    [SS]   1735 Dr. Wilkerson, stroke neurologist is at bedside.  At this time, TPA is a consideration.    [SS]   1740 Dr. Treviño called and informed negative CTA head and neck. Nothing acute.     [SS]   1747 Dr. Wilkerson is at bedside. TPA will be given.    [SS]   1751 I rechecked patient and did a repeat neuro exam.  Patient is receiving TPA at this time.  She is still having right upper extremity and right lower extremity weakness.  Slurred speech has mildly improved.  She is currently hemodynamically stable.    [SS]   1830 I reviewed the EKG, normal sinus rhythm, rate of 65, no ST elevation, looks similar to previous.    [KS]   1847 I rechecked patient.  Patient's blood pressure is 103/67.    [SS]   1910 I spoke to Dr. Iverson since the patient was having a headache.  He told me to stop the TPA and get a head CT.  Repeat head CT was done and Dr. Iverson looked red and stated that he did not feel like the patient will continue getting TPA since he  cannot for sure say that the patient not have a small bleed.  He preferred to the patient be admitted upstairs get an MRI and then decide on whether or not to continue TPA.  Per his guidance this is what was done in the ED.    [KS]      ED Course User Index  [KS] Mio Poon MD  [SS] Rachelle Duff PA-C       The differential diagnosis include but are not limited to CVA, intracranial hemorrhage, migraine.    Critical Care  Performed by: Rachelle Duff PA-C  Authorized by: Gabe Owens MD     Critical care provider statement:     Critical care time (minutes):  35    Critical care was necessary to treat or prevent imminent or life-threatening deterioration of the following conditions:  CNS failure or compromise, cardiac failure, renal failure, respiratory failure and circulatory failure    Critical care was time spent personally by me on the following activities:  Blood draw for specimens, development of treatment plan with patient or surrogate, discussions with consultants, pulse oximetry, re-evaluation of patient's condition, ordering and performing treatments and interventions, examination of patient, evaluation of patient's response to treatment, ordering and review of laboratory studies and ordering and review of radiographic studies    I assumed direction of critical care for this patient from another provider in my specialty: yes (Supervised by Dr. Poon)           Reviewed pt's history and workup with Dr. Poon.  After a bedside evaluation, Dr. Poon agrees with the plan of care.      (FOR ADMIT) Based on the patient's lab findings and presenting symptoms, the doctor and I feel it is appropriate to admit the patient for further management, evaluation, and treatment.  I have discussed this with the admitting team.  I have also discussed this with the patient/family.  They are in agreement with admission.          Disposition vitals:  /81   Pulse 57   Temp 98.4 °F (36.9 °C) (Oral)   Resp 16    "Ht 170.2 cm (67\")   Wt 106 kg (233 lb 11 oz)   LMP 04/05/2021   SpO2 96%   BMI 36.60 kg/m²       DIAGNOSIS  Final diagnoses:   Cerebrovascular accident (CVA), unspecified mechanism (CMS/HCC)   Right arm weakness   Right leg weakness   Dysarthria       FOLLOW UP   No follow-up provider specified.       Rachelle Duff PA-C  04/07/21 2343    "

## 2021-04-07 NOTE — ED NOTES
Pt states she has mild HA 5/10, getting worse, 6.5/10 pt to go for repeat head CT, also states she feel like she has a bruise and mild pain to posterior R shoulder and states feels like discomfort is moving up into R side of neck. Pt also reports pain to L side of head. Pt also does have bruises to BLE. States she bruises very easily from low iron hx.        Rachell Araya, RN  04/07/21 0304

## 2021-04-07 NOTE — ED PROVIDER NOTES
Subjective   30-year-old female with past medical history of a stroke here for chief complaint of right-sided weakness.  History was obtained from EMS and the patient.  According them, patient was driving when she had left visual changes.  She states that she drove home since she had kids in her car.  Then she called EMS.  Patient states that she did have right-sided weakness.  EMS confirmed this that the patient had complete right upper and lower extremity weakness.  She is unsure if her previous stroke had a similar symptoms.  Per EMS report, patient's blood pressure was stable and her sugar was 105.  Patient was brought in for further management.  The patient denies any headaches, chest pain, shortness of breath, abdominal pain, nausea, vomiting, diarrhea, fevers, chills, or any other symptoms.  EMS also noted that the patient did have some slurred speech.          Review of Systems   All other systems reviewed and are negative.      Past Medical History:   Diagnosis Date   • Asthma    • Stroke (CMS/[facility])        Allergies   Allergen Reactions   • [place] Anaphylaxis and Shortness Of Breath   • Nuts Anaphylaxis   • Amoxicillin    • Morphine Other (See Comments)     headache   • Nsaids Unknown - Low Severity     No allergy; just cant have due to gastric sleeve   • Other      Tree nut    • Penicillins    • Sulfa Antibiotics    • Insulin Lispro Hives and Itching       Past Surgical History:   Procedure Laterality Date   •  SECTION     • CHOLECYSTECTOMY         History reviewed. No pertinent family history.    Social History     Socioeconomic History   • Marital status: Single     Spouse name: Not on file   • Number of children: Not on file   • Years of education: Not on file   • Highest education level: Not on file   Tobacco Use   • Smoking status: Former Smoker   Substance and Sexual Activity   • Alcohol use: No           Objective   Physical Exam  Vitals reviewed.   Constitutional:       General: She is not  in acute distress.     Appearance: She is not ill-appearing, toxic-appearing or diaphoretic.   HENT:      Head: Normocephalic and atraumatic.      Right Ear: External ear normal.      Left Ear: External ear normal.      Nose: Nose normal. No congestion or rhinorrhea.      Mouth/Throat:      Mouth: Mucous membranes are moist.      Pharynx: Oropharynx is clear. No oropharyngeal exudate or posterior oropharyngeal erythema.   Eyes:      General: No scleral icterus.        Right eye: No discharge.         Left eye: No discharge.      Extraocular Movements: Extraocular movements intact.      Conjunctiva/sclera: Conjunctivae normal.      Pupils: Pupils are equal, round, and reactive to light.   Cardiovascular:      Rate and Rhythm: Normal rate and regular rhythm.      Pulses: Normal pulses.      Heart sounds: Normal heart sounds. No murmur heard.   No friction rub. No gallop.    Pulmonary:      Effort: Pulmonary effort is normal. No respiratory distress.      Breath sounds: Normal breath sounds. No stridor. No wheezing, rhonchi or rales.   Chest:      Chest wall: No tenderness.   Abdominal:      General: Abdomen is flat. Bowel sounds are normal. There is no distension.      Palpations: Abdomen is soft.      Tenderness: There is no abdominal tenderness. There is no right CVA tenderness, left CVA tenderness or guarding.   Musculoskeletal:         General: No swelling or tenderness. Normal range of motion.      Cervical back: Normal range of motion and neck supple. No rigidity or tenderness.   Skin:     General: Skin is warm and dry.      Capillary Refill: Capillary refill takes less than 2 seconds.      Coloration: Skin is not jaundiced or pale.      Findings: No bruising or erythema.   Neurological:      Mental Status: She is alert and oriented to person, place, and time.      Comments: Slurred speech, right lower extremity 2 out of 5 in strength, right upper extremity 0 out of 5, 4 out of 5 strength in both left upper and  lower extremities, no facial droop noted   Psychiatric:         Mood and Affect: Mood normal.         Behavior: Behavior normal.         Procedures           ED Course  ED Course as of Apr 07 1911   Wed Apr 07, 2021   1702 I discussed with Dr. Wilkerson, stroke neurologist regarding patient.  Team D is called at this time.    [SS]   1708 NIH scale of 4.    [SS]   1735 Dr. Wilkerson, stroke neurologist is at bedside.  At this time, TPA is a consideration.    [SS]   1740 Dr. Treviño called and informed negative CTA head and neck. Nothing acute.     [SS]   1747 Dr. Wilkerson is at bedside. TPA will be given.    [SS]   1751 I rechecked patient and did a repeat neuro exam.  Patient is receiving TPA at this time.  She is still having right upper extremity and right lower extremity weakness.  Slurred speech has mildly improved.  She is currently hemodynamically stable.    [SS]   1830 I reviewed the EKG, normal sinus rhythm, rate of 65, no ST elevation, looks similar to previous.    [KS]   1847 I rechecked patient.  Patient's blood pressure is 103/67.    [SS]   1910 I spoke to Dr. Iverson since the patient was having a headache.  He told me to stop the TPA and get a head CT.  Repeat head CT was done and Dr. Iverson looked red and stated that he did not feel like the patient will continue getting TPA since he cannot for sure say that the patient not have a small bleed.  He preferred to the patient be admitted upstairs get an MRI and then decide on whether or not to continue TPA.  Per his guidance this is what was done in the ED.    [KS]      ED Course User Index  [KS] Mio Poon MD  [SS] Rachelle Duff PA-C                                           MDM  Number of Diagnoses or Management Options     Amount and/or Complexity of Data Reviewed  Clinical lab tests: ordered and reviewed  Tests in the radiology section of CPT®: ordered and reviewed    Risk of Complications, Morbidity, and/or Mortality  General comments: When I  first saw the patient, patient was complaining of right upper arm and leg weakness.  Patient had some slurred speech.  Her NIH stroke scale was 4 for me.  Given this, we emergently called a stroke alert.  Dr. Iverson came and saw the patient.  He wanted the patient to get CT of the head and neck and CT perfusion.  This was ordered.  Given that the patient's NIH stroke scale was a 4 or 5, the neurologist felt like the patient was a TPA candidate.  He spoke to the patient about the risk and benefits of TPA.  The patient spoke to her mother who is an EMS and they decided to move forward with TPA.  While the patient was getting TPA, she started complaining of a headache.  We stopped the TPA.  We reordered the head CT and the patient is going there currently.  I called the neurologist again and informed him of this.  He said the same exact thing.  He had no other suggestions.  The patient is admitted to the ICU, Dr. Cloud given that she was getting TPA and there was concern for stroke.  I defer all further management of this patient to the inpatient ICU physician and neurologist.        Final diagnoses:   Cerebrovascular accident (CVA), unspecified mechanism (CMS/HCC)       ED Disposition  ED Disposition     ED Disposition Condition Comment    Decision to Admit  Level of Care: Critical Care [6]   Diagnosis: Cerebrovascular accident (CVA), unspecified mechanism (CMS/HCC) [[id number]]   Admitting Physician: KADY HENNESYS [[id number]]   Attending Physician: KADY HENNESSY [[id number]]   Certification: I Certify That Inpatient Hospital Services Are Medically Necessary For Greater Than 2 Midnights            No follow-up provider specified.       Medication List      No changes were made to your prescriptions during this visit.          Mio Poon MD  04/07/21 1821       Mio Poon MD  04/07/21 1831       Mio Poon MD  04/07/21 1911

## 2021-04-07 NOTE — H&P
[facility]  [phone]  Chilo Mckeon MD      Subjective   LOS: 0 days     30-year-old female with new onset right-sided weakness.  Patient apparently had left vision changes and right-sided weakness.  She called EMS.  I did not speak to the ER physician personally as this was earlier in the day.  However entry time to the ER is recorded as 1656 hrs.  CT head and CT perfusion did not show acute obstructive abnormality.  However based on symptoms patient given TPA.  Subsequent to 20 minutes into TPA patient developed a headache.  TPA was discontinued.  Stat CT head did not show any immediate evidence of bleed.  Patient is being taken for MRI and TPA has been discontinued per neuro stroke team.  Upon my evaluation patient is awake alert and answers questions appropriately.  She states she is mildly confused but answers all my questions appropriately.    She was treated for a stroke at [facility] in August 2019.  See the note below.  It looks like the eventual diagnosis was no acute stroke.  Patient did not have any subsequent follow-up with neurology.  She has not been on any anticoagulation or antiplatelet agents.  She denies any hypertension history.  No hypercoagulable disorders in herself or her family.  She has asthma.  She uses a rescue inhaler intermittently and probably has mild persistent to moderate persistent asthma.  She does not use maintenance inhalers.  Her last admission for asthma exacerbation was in 2018.  She is not on oral contraceptives and does not use anything for contraception at this time.  She has had pregnancies to term in the past.    She does have a recorded history of ADD according to care everywhere notes.  Looks like [place] have [place].  She has had major depressive disorder.  She has chronic low back pain with degenerative change at L5-S1 record 8/22/2019.    From [facility] Admission: 8/19/2019  Hospital Course: Rosana mcknight  28-year-old female with a past medical history of depression and anxiety all with asthma who was admitted to the hospital for an acute stroke. Upon hospitalization, a CT head was obtained which did not reveal any acute abnormalities. However, an MRI brain was subsequently ordered which did show a tiny stroke in her right centrum semiovale without hemorrhage or mass effect. With these findings, neurology was consulted but her neurological symptoms were not adding up with her MRI findings. As a result, the radiologist was called and confirmed the stroke finding. For this reason, secondary workup was initiated including a CTA head and neck which came back negative. A 2-D echo was also ordered which did not reveal any acute LV dysfunction or abnormal communication. Hypocoagulable workup was also ordered which also came back negative. She was subsequently started on Plavix and Lipitor. During her hospital stay, patient also started to develop left lower leg numbness and tingling. She also had hyporeflexia. An MRI of her lumbar spine was then ordered which did show lumbar stenosis but nothing to explain her findings clinically. A repeat MRI brain was then ordered the next day came back normal. As a result, patient was ruled out for having any acute stroke in the first place. Her Plavix and Lipitor were therefore discontinued. Over the course of her hospital stay, Rosana's condition improved. As a result, patient was medical stable for discharge. It is unknown why patient presented with symptoms she did when she was first hospitalized. She did however state that she is under a lot of stress and some of her symptoms may have been psychosomatic. She was advised to further address her anxiety and stress with either her primary care doctor or referral to a psychiatrist or psychologist.      Rosana Farnsworth  reports no history of alcohol use.,  reports that she has quit smoking. She does not have any smokeless tobacco history  on file.     Past Hx:  has a past medical history of Asthma and Stroke (CMS/[facility]).  Surg Hx:  has a past surgical history that includes Cholecystectomy and  section.  FH: family history is not on file.  SH:  reports that she has quit smoking. She does not have any smokeless tobacco history on file. She reports that she does not drink alcohol. No history on file for drug use.    (Not in a hospital admission)    Allergies   Allergen Reactions   • [place] Anaphylaxis and Shortness Of Breath   • Nuts Anaphylaxis   • Amoxicillin    • Morphine Other (See Comments)     headache   • Nsaids Unknown - Low Severity     No allergy; just cant have due to gastric sleeve   • Other      Tree nut    • Penicillins    • Sulfa Antibiotics    • Insulin Lispro Hives and Itching       Review of Systems   Constitutional: Negative for chills and fever.   HENT: Negative for congestion and sore throat.    Respiratory: Negative for cough and shortness of breath.    Cardiovascular: Negative for chest pain and leg swelling.   Gastrointestinal: Negative for abdominal pain, nausea and vomiting.   Genitourinary: Negative for dysuria and hematuria.   Musculoskeletal: Negative for arthralgias and back pain.   Skin: Negative for pallor and rash.   Neurological: Positive for weakness. Negative for headaches.   Psychiatric/Behavioral: Negative for agitation and confusion.     Vital Signs past 24hrs  BP range: BP: ()/(61-86) 109/70  Pulse range: Heart Rate:  [60-82] 72  Resp rate range: Resp:  [16-18] 16  Temp range: Temp (24hrs), Av.4 °F (37.4 °C), Min:99.4 °F (37.4 °C), Max:99.4 °F (37.4 °C)    Oxygen range: SpO2:  [94 %-97 %] 96 %;  ;   Device (Oxygen Therapy): room air  106 kg (233 lb 11 oz); Body mass index is 36.6 kg/m².  No intake/output data recorded.    Adult female laying in bed.  No acute distress.  Pupils equal and react to light.  Oropharynx moist large tongue with class IV Mallampati airway.  No posterior pharyngeal  discharge.  Nasopharynx without discharge septum midline.  JVP not elevated trachea midline thyroid not enlarged.  Patient has a large neck.  Lungs reveal bilateral air entry clear to auscultation no rales rhonchi wheeze.  Percussion note resonant chest expansion equal no chest wall deformity or tenderness.  Heart examination S1-S2 present rhythm regular no murmurs.  No edema lower extremities.  Abdomen is obese, soft, nontender.  Bowel sounds present no liver spleen enlargement.  No peripheral cyanosis clubbing.  No cervical, axillary, inguinal adenopathy.  Pupils equal reactive to light and accommodation.  Extraocular muscles intact.  No facial droop.  No facial asymmetry.  No dysarthria.  Cranial nerves grossly intact.  Right upper extremity 3/5.  Right lower extremity 3/5.  Sensory intact.    Results Review:    I have reviewed the laboratory and imaging data from current admission. My annotations are as noted in assessment and plan.    Ribeiro 8/23/2019:  FINDINGS:   The previously seen tiny focus of restricted diffusion in the posterior aspect of the right centrum semiovale is not visible on this examination. There is no restricted diffusion to suggest an acute infarction.. The major arterial and dural venous   flow-voids are preserved. There is no abnormal intracranial enhancement. The structures of the posterior fossa appear normal. The pituitary gland is normal in size. The orbital structures have a grossly normal MR appearance.   IMPRESSION:   No MR evidence of acute intracranial abnormality. The previously seen tiny infarction is not visible on this examination.     Note from 8/22/2019:  FINDINGS:   Vertebral height and alignment are anatomic.   On axial imaging,   L1-2: Negative   L2-3: Mild bilateral facet arthropathy.   L3-4: Mild left facet arthropathy.   L4-5: Mild left facet arthropathy.   L5-S1: Moderate bilateral facet arthropathy. Disc bulge is eccentric to the left far lateral region and the  posterior region. Posteriorly there are anterior degenerative changes and disc bulge contacts the left S1 nerve root in the left lateral recess.   Disc bulge causes mild left foraminal narrowing.   IMPRESSION:   Degenerative change is most pronounced at L5-S1.       Medication Review:  I have reviewed the current MAR. My annotations are as noted in assessment and plan.    Plan   PCCM Problems  Acute right-sided weakness, negative CT head and previous negative MRI  History ADD and major depression  Mild persistent or moderate persistent asthma without exacerbation  Obesity    Plan of Treatment  Unclear if acute weakness has organic pathology.  Await further neuro recommendations.  Developed a headache with TPA and TPA was discontinued.  Pending MRI now.    History asthma but without current exacerbation.  Monitor for same.    Obesity and risk factors for sleep apnea.    I spent 35 mins critical care time in care of this patient outside of any procedures.     Electronically signed by Chilo Mckeon MD, 04/07/21, 7:34 PM EDT.      Part of this note may be an electronic transcription/translation of spoken language to printed text using the Dragon Dictation System.

## 2021-04-07 NOTE — ED NOTES
I wore full protective equipment throughout this patient encounter including a face mask, goggles, and gloves. Hand hygiene was performed before donning protective equipment and after removal when leaving the room.       Rubia Daigle RN  04/07/21 0774

## 2021-04-08 ENCOUNTER — APPOINTMENT (OUTPATIENT)
Dept: CARDIOLOGY | Facility: HOSPITAL | Age: 31
End: 2021-04-08

## 2021-04-08 ENCOUNTER — APPOINTMENT (OUTPATIENT)
Dept: CT IMAGING | Facility: HOSPITAL | Age: 31
End: 2021-04-08

## 2021-04-08 LAB
ALBUMIN SERPL-MCNC: 3.5 G/DL (ref 3.5–5.2)
ALBUMIN/GLOB SERPL: 1.9 G/DL
ALP SERPL-CCNC: 57 U/L (ref 39–117)
ALT SERPL W P-5'-P-CCNC: 16 U/L (ref 1–33)
ANION GAP SERPL CALCULATED.3IONS-SCNC: 9.8 MMOL/L (ref 5–15)
AORTIC ARCH: 2.1 CM
AORTIC DIMENSIONLESS INDEX: 0.8 (DI)
AST SERPL-CCNC: 15 U/L (ref 1–32)
BH CV ECHO MEAS - ACS: 2.4 CM
BH CV ECHO MEAS - AO MAX PG (FULL): 2.5 MMHG
BH CV ECHO MEAS - AO MAX PG: 7 MMHG
BH CV ECHO MEAS - AO MEAN PG (FULL): 2 MMHG
BH CV ECHO MEAS - AO MEAN PG: 4 MMHG
BH CV ECHO MEAS - AO ROOT AREA (BSA CORRECTED): 1.4
BH CV ECHO MEAS - AO ROOT AREA: 7.5 CM^2
BH CV ECHO MEAS - AO ROOT DIAM: 3.1 CM
BH CV ECHO MEAS - AO V2 MAX: 132 CM/SEC
BH CV ECHO MEAS - AO V2 MEAN: 90.6 CM/SEC
BH CV ECHO MEAS - AO V2 VTI: 28.7 CM
BH CV ECHO MEAS - AVA(I,A): 3.2 CM^2
BH CV ECHO MEAS - AVA(I,D): 3.2 CM^2
BH CV ECHO MEAS - AVA(V,A): 3.3 CM^2
BH CV ECHO MEAS - AVA(V,D): 3.3 CM^2
BH CV ECHO MEAS - BSA(HAYCOCK): 2.3 M^2
BH CV ECHO MEAS - BSA: 2.2 M^2
BH CV ECHO MEAS - BZI_BMI: 36.5 KILOGRAMS/M^2
BH CV ECHO MEAS - BZI_METRIC_HEIGHT: 170.2 CM
BH CV ECHO MEAS - BZI_METRIC_WEIGHT: 105.7 KG
BH CV ECHO MEAS - EDV(CUBED): 125 ML
BH CV ECHO MEAS - EDV(MOD-SP2): 106 ML
BH CV ECHO MEAS - EDV(MOD-SP4): 93 ML
BH CV ECHO MEAS - EDV(TEICH): 118.2 ML
BH CV ECHO MEAS - EF(CUBED): 73.5 %
BH CV ECHO MEAS - EF(MOD-BP): 67 %
BH CV ECHO MEAS - EF(MOD-SP2): 61.3 %
BH CV ECHO MEAS - EF(MOD-SP4): 67.7 %
BH CV ECHO MEAS - EF(TEICH): 65.1 %
BH CV ECHO MEAS - ESV(CUBED): 33.1 ML
BH CV ECHO MEAS - ESV(MOD-SP2): 41 ML
BH CV ECHO MEAS - ESV(MOD-SP4): 30 ML
BH CV ECHO MEAS - ESV(TEICH): 41.3 ML
BH CV ECHO MEAS - FS: 35.8 %
BH CV ECHO MEAS - IVS/LVPW: 1
BH CV ECHO MEAS - IVSD: 1.1 CM
BH CV ECHO MEAS - LAT PEAK E' VEL: 12.6 CM/SEC
BH CV ECHO MEAS - LV DIASTOLIC VOL/BSA (35-75): 43.1 ML/M^2
BH CV ECHO MEAS - LV MASS(C)D: 199.4 GRAMS
BH CV ECHO MEAS - LV MASS(C)DI: 92.4 GRAMS/M^2
BH CV ECHO MEAS - LV MAX PG: 4.5 MMHG
BH CV ECHO MEAS - LV MEAN PG: 2 MMHG
BH CV ECHO MEAS - LV SYSTOLIC VOL/BSA (12-30): 13.9 ML/M^2
BH CV ECHO MEAS - LV V1 MAX: 106 CM/SEC
BH CV ECHO MEAS - LV V1 MEAN: 73.9 CM/SEC
BH CV ECHO MEAS - LV V1 VTI: 22 CM
BH CV ECHO MEAS - LVIDD: 5 CM
BH CV ECHO MEAS - LVIDS: 3.2 CM
BH CV ECHO MEAS - LVLD AP2: 8.7 CM
BH CV ECHO MEAS - LVLD AP4: 8 CM
BH CV ECHO MEAS - LVLS AP2: 7.2 CM
BH CV ECHO MEAS - LVLS AP4: 5.8 CM
BH CV ECHO MEAS - LVOT AREA (M): 4.2 CM^2
BH CV ECHO MEAS - LVOT AREA: 4.2 CM^2
BH CV ECHO MEAS - LVOT DIAM: 2.3 CM
BH CV ECHO MEAS - LVPWD: 1.1 CM
BH CV ECHO MEAS - MED PEAK E' VEL: 11 CM/SEC
BH CV ECHO MEAS - MR MAX PG: 3.7 MMHG
BH CV ECHO MEAS - MR MAX VEL: 96.4 CM/SEC
BH CV ECHO MEAS - MV A DUR: 0.14 SEC
BH CV ECHO MEAS - MV A MAX VEL: 75.4 CM/SEC
BH CV ECHO MEAS - MV DEC SLOPE: 319 CM/SEC^2
BH CV ECHO MEAS - MV DEC TIME: 0.22 SEC
BH CV ECHO MEAS - MV E MAX VEL: 65.6 CM/SEC
BH CV ECHO MEAS - MV E/A: 0.87
BH CV ECHO MEAS - MV MAX PG: 3.5 MMHG
BH CV ECHO MEAS - MV MEAN PG: 2 MMHG
BH CV ECHO MEAS - MV P1/2T MAX VEL: 70.3 CM/SEC
BH CV ECHO MEAS - MV P1/2T: 64.5 MSEC
BH CV ECHO MEAS - MV V2 MAX: 93.3 CM/SEC
BH CV ECHO MEAS - MV V2 MEAN: 58.5 CM/SEC
BH CV ECHO MEAS - MV V2 VTI: 33.9 CM
BH CV ECHO MEAS - MVA P1/2T LCG: 3.1 CM^2
BH CV ECHO MEAS - MVA(P1/2T): 3.4 CM^2
BH CV ECHO MEAS - MVA(VTI): 2.7 CM^2
BH CV ECHO MEAS - PA MAX PG (FULL): 2.9 MMHG
BH CV ECHO MEAS - PA MAX PG: 5.3 MMHG
BH CV ECHO MEAS - PA V2 MAX: 115 CM/SEC
BH CV ECHO MEAS - PULM A REVS DUR: 0.11 SEC
BH CV ECHO MEAS - PULM A REVS VEL: 28.7 CM/SEC
BH CV ECHO MEAS - PULM DIAS VEL: 54.4 CM/SEC
BH CV ECHO MEAS - PULM S/D: 0.81
BH CV ECHO MEAS - PULM SYS VEL: 44.1 CM/SEC
BH CV ECHO MEAS - PVA(V,A): 2.8 CM^2
BH CV ECHO MEAS - PVA(V,D): 2.8 CM^2
BH CV ECHO MEAS - QP/QS: 0.8
BH CV ECHO MEAS - RAP SYSTOLE: 3 MMHG
BH CV ECHO MEAS - RV MAX PG: 2.4 MMHG
BH CV ECHO MEAS - RV MEAN PG: 1 MMHG
BH CV ECHO MEAS - RV V1 MAX: 77 CM/SEC
BH CV ECHO MEAS - RV V1 MEAN: 56 CM/SEC
BH CV ECHO MEAS - RV V1 VTI: 17.6 CM
BH CV ECHO MEAS - RVOT AREA: 4.2 CM^2
BH CV ECHO MEAS - RVOT DIAM: 2.3 CM
BH CV ECHO MEAS - RVSP: 21 MMHG
BH CV ECHO MEAS - SI(AO): 100.4 ML/M^2
BH CV ECHO MEAS - SI(CUBED): 42.6 ML/M^2
BH CV ECHO MEAS - SI(LVOT): 42.4 ML/M^2
BH CV ECHO MEAS - SI(MOD-BP): 18 ML/M2
BH CV ECHO MEAS - SI(MOD-SP2): 30.1 ML/M^2
BH CV ECHO MEAS - SI(MOD-SP4): 29.2 ML/M^2
BH CV ECHO MEAS - SI(TEICH): 35.7 ML/M^2
BH CV ECHO MEAS - SUP REN AO DIAM: 1.7 CM
BH CV ECHO MEAS - SV(AO): 216.6 ML
BH CV ECHO MEAS - SV(CUBED): 91.9 ML
BH CV ECHO MEAS - SV(LVOT): 91.4 ML
BH CV ECHO MEAS - SV(MOD-SP2): 65 ML
BH CV ECHO MEAS - SV(MOD-SP4): 63 ML
BH CV ECHO MEAS - SV(RVOT): 73.1 ML
BH CV ECHO MEAS - SV(TEICH): 77 ML
BH CV ECHO MEAS - TAPSE (>1.6): 2.5 CM
BH CV ECHO MEAS - TR MAX VEL: 214 CM/SEC
BH CV ECHO MEASUREMENTS AVERAGE E/E' RATIO: 5.56
BH CV XLRA - RV BASE: 2.8 CM
BH CV XLRA - RV LENGTH: 6.5 CM
BH CV XLRA - RV MID: 1.9 CM
BH CV XLRA - TDI S': 12.1 CM/SEC
BILIRUB SERPL-MCNC: 0.5 MG/DL (ref 0–1.2)
BUN SERPL-MCNC: 6 MG/DL (ref 6–20)
BUN/CREAT SERPL: 11.3 (ref 7–25)
CALCIUM SPEC-SCNC: 7.8 MG/DL (ref 8.6–10.5)
CHLORIDE SERPL-SCNC: 110 MMOL/L (ref 98–107)
CHOLEST SERPL-MCNC: 114 MG/DL (ref 0–200)
CO2 SERPL-SCNC: 22.2 MMOL/L (ref 22–29)
CREAT SERPL-MCNC: 0.53 MG/DL (ref 0.57–1)
DEPRECATED RDW RBC AUTO: 42.2 FL (ref 37–54)
ERYTHROCYTE [DISTWIDTH] IN BLOOD BY AUTOMATED COUNT: 14.7 % (ref 12.3–15.4)
ERYTHROCYTE [SEDIMENTATION RATE] IN BLOOD: 2 MM/HR (ref 0–20)
GFR SERPL CREATININE-BSD FRML MDRD: 137 ML/MIN/1.73
GLOBULIN UR ELPH-MCNC: 1.8 GM/DL
GLUCOSE BLDC GLUCOMTR-MCNC: 147 MG/DL (ref 70–130)
GLUCOSE BLDC GLUCOMTR-MCNC: 93 MG/DL (ref 70–130)
GLUCOSE SERPL-MCNC: 77 MG/DL (ref 65–99)
HBA1C MFR BLD: 4.4 % (ref 4.8–5.6)
HCT VFR BLD AUTO: 36.1 % (ref 34–46.6)
HDLC SERPL-MCNC: 55 MG/DL (ref 40–60)
HGB BLD-MCNC: 12.3 G/DL (ref 12–15.9)
LDLC SERPL CALC-MCNC: 48 MG/DL (ref 0–100)
LDLC/HDLC SERPL: 0.92 {RATIO}
LV EF 2D ECHO EST: 67 %
MAGNESIUM SERPL-MCNC: 1.7 MG/DL (ref 1.6–2.6)
MAXIMAL PREDICTED HEART RATE: 192 BPM
MCH RBC QN AUTO: 27.2 PG (ref 26.6–33)
MCHC RBC AUTO-ENTMCNC: 34.1 G/DL (ref 31.5–35.7)
MCV RBC AUTO: 79.9 FL (ref 79–97)
PHOSPHATE SERPL-MCNC: 2.9 MG/DL (ref 2.5–4.5)
PLATELET # BLD AUTO: 238 10*3/MM3 (ref 140–450)
PMV BLD AUTO: 9.1 FL (ref 6–12)
POTASSIUM SERPL-SCNC: 3.8 MMOL/L (ref 3.5–5.2)
PROT SERPL-MCNC: 5.3 G/DL (ref 6–8.5)
QT INTERVAL: 457 MS
RBC # BLD AUTO: 4.52 10*6/MM3 (ref 3.77–5.28)
SODIUM SERPL-SCNC: 142 MMOL/L (ref 136–145)
STRESS TARGET HR: 163 BPM
TRIGL SERPL-MCNC: 42 MG/DL (ref 0–150)
VLDLC SERPL-MCNC: 11 MG/DL (ref 5–40)
WBC # BLD AUTO: 5.58 10*3/MM3 (ref 3.4–10.8)

## 2021-04-08 PROCEDURE — 80061 LIPID PANEL: CPT | Performed by: PSYCHIATRY & NEUROLOGY

## 2021-04-08 PROCEDURE — 80053 COMPREHEN METABOLIC PANEL: CPT | Performed by: INTERNAL MEDICINE

## 2021-04-08 PROCEDURE — 86235 NUCLEAR ANTIGEN ANTIBODY: CPT | Performed by: INTERNAL MEDICINE

## 2021-04-08 PROCEDURE — 99232 SBSQ HOSP IP/OBS MODERATE 35: CPT | Performed by: PSYCHIATRY & NEUROLOGY

## 2021-04-08 PROCEDURE — 86225 DNA ANTIBODY NATIVE: CPT | Performed by: INTERNAL MEDICINE

## 2021-04-08 PROCEDURE — 85027 COMPLETE CBC AUTOMATED: CPT | Performed by: INTERNAL MEDICINE

## 2021-04-08 PROCEDURE — 85652 RBC SED RATE AUTOMATED: CPT | Performed by: INTERNAL MEDICINE

## 2021-04-08 PROCEDURE — 85732 THROMBOPLASTIN TIME PARTIAL: CPT | Performed by: INTERNAL MEDICINE

## 2021-04-08 PROCEDURE — 84100 ASSAY OF PHOSPHORUS: CPT | Performed by: INTERNAL MEDICINE

## 2021-04-08 PROCEDURE — 93306 TTE W/DOPPLER COMPLETE: CPT

## 2021-04-08 PROCEDURE — 83036 HEMOGLOBIN GLYCOSYLATED A1C: CPT | Performed by: PSYCHIATRY & NEUROLOGY

## 2021-04-08 PROCEDURE — 83735 ASSAY OF MAGNESIUM: CPT | Performed by: INTERNAL MEDICINE

## 2021-04-08 PROCEDURE — 93306 TTE W/DOPPLER COMPLETE: CPT | Performed by: INTERNAL MEDICINE

## 2021-04-08 PROCEDURE — 85613 RUSSELL VIPER VENOM DILUTED: CPT | Performed by: INTERNAL MEDICINE

## 2021-04-08 PROCEDURE — 70450 CT HEAD/BRAIN W/O DYE: CPT

## 2021-04-08 PROCEDURE — 82962 GLUCOSE BLOOD TEST: CPT

## 2021-04-08 RX ADMIN — ATORVASTATIN CALCIUM 80 MG: 80 TABLET, FILM COATED ORAL at 20:42

## 2021-04-08 RX ADMIN — SODIUM CHLORIDE, PRESERVATIVE FREE 10 ML: 5 INJECTION INTRAVENOUS at 00:01

## 2021-04-08 RX ADMIN — ATORVASTATIN CALCIUM 80 MG: 80 TABLET, FILM COATED ORAL at 00:52

## 2021-04-08 RX ADMIN — SODIUM CHLORIDE 100 ML/HR: 9 INJECTION, SOLUTION INTRAVENOUS at 00:53

## 2021-04-08 RX ADMIN — SODIUM CHLORIDE, PRESERVATIVE FREE 10 ML: 5 INJECTION INTRAVENOUS at 08:26

## 2021-04-08 NOTE — PROGRESS NOTES
Neurology Note    Patient:  Rosana Farnsworth    YOB: 1990    REFERRING PHYSICIAN:  Gabe Owens MD    CHIEF COMPLAINT:    Right sided weakness    HISTORY OF PRESENT ILLNESS:   The patient's symptoms have resolved, NIHSS 0, able to eat and get up. Headache has resolved withy migraine cocktail. MRI brain is normal, personally reviewed. Patient is COVID positive, in isolation. She has been afebrile.    Past Medical History:  Past Medical History:   Diagnosis Date   • Asthma    • Stroke (CMS/HCC)        Past Surgical History:  Past Surgical History:   Procedure Laterality Date   •  SECTION     • CHOLECYSTECTOMY         Social History:   Social History     Socioeconomic History   • Marital status: Single     Spouse name: Not on file   • Number of children: Not on file   • Years of education: Not on file   • Highest education level: Not on file   Tobacco Use   • Smoking status: Former Smoker   Substance and Sexual Activity   • Alcohol use: No        Family History:   History reviewed. No pertinent family history.    Medications Prior to Admission:    Prior to Admission medications    Medication Sig Start Date End Date Taking? Authorizing Provider   Calcium Carbonate-Vit D-Min (CALCIUM 1200 PO) Take  by mouth.   Yes Jc Lawrence MD   clonazePAM (KlonoPIN) 0.5 MG tablet Take 0.5 mg by mouth 2 (Two) Times a Day As Needed for Seizures.   Yes Jc Lawrence MD   desvenlafaxine (PRISTIQ) 100 MG 24 hr tablet Take 100 mg by mouth Daily.   Yes Jc Lawrence MD   ferrous sulfate 325 (65 FE) MG tablet Take 325 mg by mouth Daily With Breakfast.   Yes Jc Lawrence MD   albuterol (PROVENTIL HFA;VENTOLIN HFA) 108 (90 BASE) MCG/ACT inhaler Inhale 2 puffs every 4 (four) hours as needed for wheezing. 16   Dustin Palacios MD   butalbital-acetaminophen-caffeine (FIORICET, ESGIC) -40 MG per tablet Take 1 tablet by mouth Every 6 (Six) Hours As Needed for Headache.  11/7/18   Mukund Souza MD   HYDROcodone-acetaminophen (NORCO) 7.5-325 MG per tablet Take 1 tablet by mouth Every 4 (Four) Hours As Needed for Moderate Pain . 4/18/19   Dustin Palacios MD   L-Methylfolate-Algae 7.5-90.314 MG capsule Take 1 tablet by mouth Daily.    ProviderJc MD   levoFLOXacin (LEVAQUIN) 750 MG tablet Take 1 tablet by mouth Daily. 11/7/18   Mukund Souza MD   metaxalone (SKELAXIN) 800 MG tablet Take 1 tablet by mouth 3 (Three) Times a Day As Needed for Muscle Spasms. 4/18/19   Dustin Palacios MD   predniSONE (DELTASONE) 5 MG tablet Take 5 mg by mouth Daily.    ProviderJc MD   predniSONE (DELTASONE) 50 MG tablet Take 1 tablet by mouth Daily. 4/18/19   Dustin Palacios MD   Prenatal Vit-Fe Fumarate-FA (PRENATAL, CLASSIC, VITAMIN) 28-0.8 MG tablet tablet Take  by mouth daily.    ProviderJc MD       Allergies:  [place], Nuts, Amoxicillin, Morphine, Nsaids, Other, Penicillins, Sulfa antibiotics, and Insulin lispro      Review of system  Review of Systems   Neurological: Negative for weakness and headaches.       Vitals:    04/08/21 1030   BP: 119/63   Pulse: 67   Resp:    Temp:    SpO2: 92%       Physical exam  Physical Exam  Not examined to conserve PPE and to limit the spread of COVID.    Lab Results   Component Value Date    WBC 5.58 04/08/2021    HGB 12.3 04/08/2021    HCT 36.1 04/08/2021    MCV 79.9 04/08/2021     04/08/2021     Lab Results   Component Value Date    GLUCOSE 77 04/08/2021    BUN 6 04/08/2021    CREATININE 0.53 (L) 04/08/2021    EGFRIFNONA 137 04/08/2021    BCR 11.3 04/08/2021    CO2 22.2 04/08/2021    CALCIUM 7.8 (L) 04/08/2021    ALBUMIN 3.50 04/08/2021    LABIL2 1.2 09/17/2020    AST 15 04/08/2021    ALT 16 04/08/2021     C-Reactive Protein   0.00 - 0.50 mg/dL <0.30    Lipid Panel  Order: [id number]  Status:  Final result   Visible to patient:  No (not released) Next appt:  None  Specimen Information: Blood         Component   Ref Range & Units 01:02 1 yr ago   Total Cholesterol   0 - 200 mg/dL 114  135 R, CM    Triglycerides   0 - 150 mg/dL 42  74 R, CM    HDL Cholesterol   40 - 60 mg/dL 55  24Low  R    LDL Cholesterol    0 - 100 mg/dL 48  96 CM    VLDL Cholesterol   5 - 40 mg/dL 11  15 R    LDL/HDL Ratio  0.92     Resulting Agency  [facility]            Hemoglobin A1C   4.80 - 5.60 % 4.40Low       Folate   4.78 - 24.20 ng/mL 17.50      Vitamin B-12   211 - 946 pg/mL 416      TSH   0.270 - 4.200 uIU/mL 1.290      Homocysteine, Plasma (Quant)   0.0 - 15.0 umol/L 7.7      Ethanol %   % <0.010      COVID19   Not Detected - Ref. Range DetectedCritical               ECG 12 Lead  Order: [id number]  Status:  Final result   Visible to patient:  No (not released) Next appt:  None  Component   Ref Range & Units 4/7/21 1813   QT Interval   ms 457       Narrative & Impression    HEART RATE= 65  bpm  RR Interval= 916  ms  WI Interval= 144  ms  P Horizontal Axis= 17  deg  P Front Axis= 29  deg  QRSD Interval= 90  ms  QT Interval= 457  ms  QRS Axis= -52  deg  T Wave Axis= 35  deg  - ABNORMAL ECG -  Sinus rhythm  Left anterior fascicular block  Borderline prolonged QT interval  No change from prior tracing  Electronically Signed By: Panfilo Salguero (CASSANRDA) ([facility]) 08-Apr-2021 09:49:03  Date and Time of Study: 2021-04-07 18:13:33      Specimen Collected: 04/07/21 18:13               Radiological Studies:   BRAIN MRI WITH AND WITHOUT CONTRAST     HISTORY: stroke; I63.9-Cerebral infarction, unspecified     COMPARISON: 04/07/2021.     FINDINGS:  Multiplanar images of the head were obtained without and with  gadolinium. No areas of restricted diffusion are seen to suggest acute  infarct. The ventricles are normal in size. There is no midline shift or  mass effect. There is really no significant microangiopathic change.  There is no evidence of hemorrhage on gradient-echo imaging.  Postcontrast imaging does not show any suspicious enhancement  or  evidence of venous occlusion. There is mucosal thickening identified  within the ethmoid and maxillary sinuses, with mucus retention cyst  noted within the maxillary sinuses.     IMPRESSION:  1. No acute intracranial abnormality, no abnormal enhancement.     This report was finalized on 4/7/2021 9:41 PM by Dr. Susan Reyes M.D.     CT ANGIOGRAM HEAD-, CT CEREBRAL PERFUSION W WO CONTRAST W AI ANALYSIS OF  LVO-, CT ANGIOGRAM NECK-     CT ANGIOGRAM HEAD AND NECK AND CT PERFUSION STUDY     CLINICAL HISTORY: Stroke        Radiation dose reduction techniques were utilized, including automated  exposure control and exposure modulation based on body size. CT scan of  the head was obtained with 3 mm axial images without the use  of IV contrast. The patient underwent a CT  perfusion study with a dynamic bolus of IV contrast. Standard perfusion  maps were constructed. The patient then underwent a CT angiogram of the  head and neck with 1 mm axial images following the administration of IV  contrast. Sagittal, coronal, and 3-dimensional reconstructed images were  obtained.  Percent stenosis was assessed in accordance with NASCET  criteria.     FINDINGS:     NONCONTRAST HEAD CT: On the noncontrast head CT, gray-white matter  differentiation is preserved, and no acute hemorrhage or hydrocephalus  is identified. Small likely mucous retention cyst in right maxillary  sinus.     No enhancing lesions of brain.        CT PERFUSION STUDY:  No CBF (under 30%) deficit or perfusion abnormality  is demonstrated where the T MAX is greater than 6 seconds. The  calculated mismatch volume was 0 cc.     CTA HEAD and neck: The CT angiogram of the head and neck demonstrates no  hemodynamically significant focal stenosis, aneurysm, or dissection in  the cervical carotid or vertebral arteries, or in the arteries at the  base of the brain.           IMPRESSION:        1. No perfusion abnormality to suggest completed or threatened  acute  infarct. If indicated, MRI could be considered for further evaluation.  2. No arterial cutoff or high-grade arterial stenosis identified.  3. No acute intracranial hemorrhage or hydrocephalus. No enhancing  lesions of brain.           Discussed by telephone with 1727, 1739, Rachelle Duff at 1740,  04/07/2021.     This report was finalized on 4/7/2021 5:44 PM by Dr. Bradly Baez,    During this visit the following were done:  Labs Reviewed [x]    Labs Ordered []    Radiology Reports Reviewed [x]    Radiology Ordered []    EKG, echo, and/or stress test reviewed [x]    EEG results reviewed  []    EEG reviewed and interpreted per myself   []    Discussed case with neurointerventionalist or neuroradiologist []    Referring Provider Records Reviewed []    ER Records Reviewed []    Hospital Records Reviewed []    History Obtained From Family []    Radiological images view and Interpreted per myself [x]    Case Discussed with referring provider []     Decision to obtain and request outside records  []        Assessment and Plan     Stroke-like symptoms, resolved, subsequent headache, all symptoms resolved. Apparent migraine with aura. Received partial TPA dose, stopped 22 headache. MRI brain, CTA normal, personally reviewed. COVID-19 positive, no fever.   - Continue ICU observation.   - 24 hour CT head around 6 pm.   - TTE inpatient vs outpatient.   - Home on aspirin.   - Oupatient neurology F/U.      Electronically signed by Fox Chatman MD on 4/8/2021 at 10:40 EDT

## 2021-04-08 NOTE — CONSULTS
Acute rehab referral received via stroke order set. Patient with NIHSS 0. Will sign off.    Thank you!    Noam Dorantes RN  p

## 2021-04-08 NOTE — PLAN OF CARE
Goal Outcome Evaluation:  Plan of Care Reviewed With: other (see comments) (RN)     Outcome Summary: Orders received per stroke protocol. Patient passed swallow screen and is tolerating a regular diet per RN. No evaluation warranted at this time. Please re-consult as indicated. Thank you.

## 2021-04-08 NOTE — SIGNIFICANT NOTE
04/08/21 1057   OTHER   Discipline occupational therapist   Rehab Time/Intention   Session Not Performed other (see comments)  (spoke with RN, Leeann. Pt NIH=0, neuro deficits resolved and up with nsg SBA for ADL. No OT evaluation warrented at this time. Will sign off.)

## 2021-04-08 NOTE — PAYOR COMM NOTE
"Paris Mejia (30 y.o. Female)                            ATTENTION;      FOR NOTIFICATION OF INPATIENT ADMISSION , REPLY TO UR DEPT,                         CELIA ALVES LPN  [phone] OR CALL            Date of Birth Social Security Number Address Home Phone MRN    [id number]  [address] [place] [id number] [phone] [id number]    Jew Marital Status          Sikhism Single       Admission Date Admission Type Admitting Provider Attending Provider Department, Room/Bed    4/7/21 Emergency Gabe Owens MD Kellie, Scott P, MD [facility] INTENSIVE CARE, I383/1    Discharge Date Discharge Disposition Discharge Destination                       Attending Provider: Gabe Owens MD    Allergies: [place]ville, Nuts, Amoxicillin, Morphine, Nsaids, Other, Penicillins, Sulfa Antibiotics, Insulin Lispro    Isolation: Enh Drop/Con   Infection: COVID (confirmed) (04/07/21)   Code Status: CPR    Ht: 170.2 cm (67\")   Wt: 106 kg (233 lb)    Admission Cmt: None   Principal Problem: None                Active Insurance as of 4/7/2021     Primary Coverage     Payor Plan Insurance Group Employer/Plan Group    [id number]     Payor Plan Address Payor Plan Phone Number Payor Plan Fax Number Effective Dates    [address]   1/1/2021 - None Entered    [facility] [address]       Subscriber Name Subscriber Birth Date Member ID       PARIS MEJIA 1990 [id number]                 Emergency Contacts      (Rel.) Home Phone Work Phone Mobile Phone    zoltan elliott (Significant Other) [phone] -- [phone]               History & Physical      Chilo Mckeon MD at 04/07/21 [address] [place] [address] Pulmonary Care  [phone]  Chilo Mckeon MD      Subjective   LOS: 0 days     30-year-old female with new onset right-sided weakness.  Patient apparently had left vision changes and right-sided weakness.  " She called EMS.  I did not speak to the ER physician personally as this was earlier in the day.  However entry time to the ER is recorded as 1656 hrs.  CT head and CT perfusion did not show acute obstructive abnormality.  However based on symptoms patient given TPA.  Subsequent to 20 minutes into TPA patient developed a headache.  TPA was discontinued.  Stat CT head did not show any immediate evidence of bleed.  Patient is being taken for MRI and TPA has been discontinued per neuro stroke team.  Upon my evaluation patient is awake alert and answers questions appropriately.  She states she is mildly confused but answers all my questions appropriately.    She was treated for a stroke at [facility] in August 2019.  See the note below.  It looks like the eventual diagnosis was no acute stroke.  Patient did not have any subsequent follow-up with neurology.  She has not been on any anticoagulation or antiplatelet agents.  She denies any hypertension history.  No hypercoagulable disorders in herself or her family.  She has asthma.  She uses a rescue inhaler intermittently and probably has mild persistent to moderate persistent asthma.  She does not use maintenance inhalers.  Her last admission for asthma exacerbation was in 2018.  She is not on oral contraceptives and does not use anything for contraception at this time.  She has had pregnancies to term in the past.    She does have a recorded history of ADD according to care everywhere notes.  Looks like [place] have [place].  She has had major depressive disorder.  She has chronic low back pain with degenerative change at L5-S1 record 8/22/2019.    From [facility] Admission: 8/19/2019  Hospital Course: Rosana is a 28-year-old female with a past medical history of depression and anxiety all with asthma who was admitted to the hospital for an acute stroke. Upon hospitalization, a CT head was obtained which did not reveal any acute  abnormalities. However, an MRI brain was subsequently ordered which did show a tiny stroke in her right centrum semiovale without hemorrhage or mass effect. With these findings, neurology was consulted but her neurological symptoms were not adding up with her MRI findings. As a result, the radiologist was called and confirmed the stroke finding. For this reason, secondary workup was initiated including a CTA head and neck which came back negative. A 2-D echo was also ordered which did not reveal any acute LV dysfunction or abnormal communication. Hypocoagulable workup was also ordered which also came back negative. She was subsequently started on Plavix and Lipitor. During her hospital stay, patient also started to develop left lower leg numbness and tingling. She also had hyporeflexia. An MRI of her lumbar spine was then ordered which did show lumbar stenosis but nothing to explain her findings clinically. A repeat MRI brain was then ordered the next day came back normal. As a result, patient was ruled out for having any acute stroke in the first place. Her Plavix and Lipitor were therefore discontinued. Over the course of her hospital stay, Rosana's condition improved. As a result, patient was medical stable for discharge. It is unknown why patient presented with symptoms she did when she was first hospitalized. She did however state that she is under a lot of stress and some of her symptoms may have been psychosomatic. She was advised to further address her anxiety and stress with either her primary care doctor or referral to a psychiatrist or psychologist.      Rosana Farnsworth  reports no history of alcohol use.,  reports that she has quit smoking. She does not have any smokeless tobacco history on file.     Past Hx:  has a past medical history of Asthma and Stroke (CMS/[facility]).  Surg Hx:  has a past surgical history that includes Cholecystectomy and  section.  FH: family history is not on file.  SH:   reports that she has quit smoking. She does not have any smokeless tobacco history on file. She reports that she does not drink alcohol. No history on file for drug use.    (Not in a hospital admission)    Allergies   Allergen Reactions   • Benkelman Anaphylaxis and Shortness Of Breath   • Nuts Anaphylaxis   • Amoxicillin    • Morphine Other (See Comments)     headache   • Nsaids Unknown - Low Severity     No allergy; just cant have due to gastric sleeve   • Other      Tree nut    • Penicillins    • Sulfa Antibiotics    • Insulin Lispro Hives and Itching       Review of Systems   Constitutional: Negative for chills and fever.   HENT: Negative for congestion and sore throat.    Respiratory: Negative for cough and shortness of breath.    Cardiovascular: Negative for chest pain and leg swelling.   Gastrointestinal: Negative for abdominal pain, nausea and vomiting.   Genitourinary: Negative for dysuria and hematuria.   Musculoskeletal: Negative for arthralgias and back pain.   Skin: Negative for pallor and rash.   Neurological: Positive for weakness. Negative for headaches.   Psychiatric/Behavioral: Negative for agitation and confusion.     Vital Signs past 24hrs  BP range: BP: ()/(61-86) 109/70  Pulse range: Heart Rate:  [60-82] 72  Resp rate range: Resp:  [16-18] 16  Temp range: Temp (24hrs), Av.4 °F (37.4 °C), Min:99.4 °F (37.4 °C), Max:99.4 °F (37.4 °C)    Oxygen range: SpO2:  [94 %-97 %] 96 %;  ;   Device (Oxygen Therapy): room air  106 kg (233 lb 11 oz); Body mass index is 36.6 kg/m².  No intake/output data recorded.    Adult female laying in bed.  No acute distress.  Pupils equal and react to light.  Oropharynx moist large tongue with class IV Mallampati airway.  No posterior pharyngeal discharge.  Nasopharynx without discharge septum midline.  JVP not elevated trachea midline thyroid not enlarged.  Patient has a large neck.  Lungs reveal bilateral air entry clear to auscultation no rales rhonchi wheeze.   Percussion note resonant chest expansion equal no chest wall deformity or tenderness.  Heart examination S1-S2 present rhythm regular no murmurs.  No edema lower extremities.  Abdomen is obese, soft, nontender.  Bowel sounds present no liver spleen enlargement.  No peripheral cyanosis clubbing.  No cervical, axillary, inguinal adenopathy.  Pupils equal reactive to light and accommodation.  Extraocular muscles intact.  No facial droop.  No facial asymmetry.  No dysarthria.  Cranial nerves grossly intact.  Right upper extremity 3/5.  Right lower extremity 3/5.  Sensory intact.    Results Review:    I have reviewed the laboratory and imaging data from current admission. My annotations are as noted in assessment and plan.    Ribeiro 8/23/2019:  FINDINGS:   The previously seen tiny focus of restricted diffusion in the posterior aspect of the right centrum semiovale is not visible on this examination. There is no restricted diffusion to suggest an acute infarction.. The major arterial and dural venous   flow-voids are preserved. There is no abnormal intracranial enhancement. The structures of the posterior fossa appear normal. The pituitary gland is normal in size. The orbital structures have a grossly normal MR appearance.   IMPRESSION:   No MR evidence of acute intracranial abnormality. The previously seen tiny infarction is not visible on this examination.     Note from 8/22/2019:  FINDINGS:   Vertebral height and alignment are anatomic.   On axial imaging,   L1-2: Negative   L2-3: Mild bilateral facet arthropathy.   L3-4: Mild left facet arthropathy.   L4-5: Mild left facet arthropathy.   L5-S1: Moderate bilateral facet arthropathy. Disc bulge is eccentric to the left far lateral region and the posterior region. Posteriorly there are anterior degenerative changes and disc bulge contacts the left S1 nerve root in the left lateral recess.   Disc bulge causes mild left foraminal narrowing.   IMPRESSION:   Degenerative  change is most pronounced at L5-S1.       Medication Review:  I have reviewed the current MAR. My annotations are as noted in assessment and plan.    Plan   PCCM Problems  Acute right-sided weakness, negative CT head and previous negative MRI  History ADD and major depression  Mild persistent or moderate persistent asthma without exacerbation  Obesity    Plan of Treatment  Unclear if acute weakness has organic pathology.  Await further neuro recommendations.  Developed a headache with TPA and TPA was discontinued.  Pending MRI now.    History asthma but without current exacerbation.  Monitor for same.    Obesity and risk factors for sleep apnea.    I spent 35 mins critical care time in care of this patient outside of any procedures.     Electronically signed by Chilo Mckeon MD, 04/07/21, 7:34 PM EDT.      Part of this note may be an electronic transcription/translation of spoken language to printed text using the Dragon Dictation System.      Electronically signed by Chilo Mckeon MD at 04/07/21 1956

## 2021-04-08 NOTE — ED NOTES
Followed up with Dr. Chatman about pt MRI results. He does not want to restart the tPA.     Rubia Daigle RN  04/07/21 8600

## 2021-04-08 NOTE — PROGRESS NOTES
Discharge Planning Assessment  [facility]     Patient Name: Rosana Farnsworth  MRN: [id number]  Today's Date: 4/8/2021    Admit Date: 4/7/2021    Discharge Needs Assessment     Row Name 04/08/21 1420       Living Environment    Lives With  significant other;child(deion), dependent    Current Living Arrangements  home/apartment/condo    Potentially Unsafe Housing Conditions  unable to assess    Primary Care Provided by  self    Provides Primary Care For  no one    Family Caregiver if Needed  significant other    Quality of Family Relationships  supportive    Able to Return to Prior Arrangements  yes       Resource/Environmental Concerns    Transportation Concerns  car, none       Transition Planning    Patient/Family Anticipates Transition to  home with family    Patient/Family Anticipated Services at Transition  none    Transportation Anticipated  family or friend will provide       Discharge Needs Assessment    Readmission Within the Last 30 Days  unable to assess    Equipment Currently Used at Home  none    Concerns to be Addressed  discharge planning    Anticipated Changes Related to Illness  none    Equipment Needed After Discharge  none        Discharge Plan     Row Name 04/08/21 1410       Plan    Plan  Home  CCP following for needs    Plan Comments  CCP spoke with patient’s DANIELLE hough, [phone] via telephone..   CCP role explained and Face sheet verified.  Discharge planning discussed.  Pt. emergency contact is [facility].  Pt PCP is Dr holly cardozo to [facility].   Pt lives in a house with her two children 9 months and 4 years old and [facility].    She is independent with ADL’s.  She uses no DME to ambulate.  Pt has no past history of Home Health.  She has no past rehab history.   Pt obtains her medications from [facility] in [place], [place].   Plan is Home.    CCP to follow up for discharge needs        Continued Care and Services - Admitted Since 4/7/2021    Coordination has not been started for this  encounter.         Demographic Summary    No documentation.       Functional Status    No documentation.       Psychosocial    No documentation.       Abuse/Neglect    No documentation.       Legal    No documentation.       Substance Abuse    No documentation.       Patient Forms    No documentation.           Miroslava Ortega RN

## 2021-04-08 NOTE — PLAN OF CARE
Goal Outcome Evaluation:  Plan of Care Reviewed With: patient  Progress: improving  Outcome Summary: Pt VSS. AOx4. No neuro deficits. NIH 0. Gets up to beside commode with stand-by assistance. Adequate urine output. No BM. Will CTM.

## 2021-04-08 NOTE — PROGRESS NOTES
"      [facility]         Dr Syed Sayied   LOS: 1 day   Patient Care Team:  Provider, No Known as PCP - General    Chief Complaint: Acute right-sided weakness status post TPA with history of ADD major depression COVID-19 infection    Interval History: Resting comfortably on room air.  Denies any headache or blurred vision as such.  Neurologically intact    REVIEW OF SYSTEMS:   CARDIOVASCULAR: No chest pain, chest pressure or chest discomfort. No palpitations or edema.   RESPIRATORY: No shortness of breath, cough or sputum.   GASTROINTESTINAL: No anorexia, nausea, vomiting or diarrhea. No abdominal pain or blood.   HEMATOLOGIC: No bleeding or bruising.     Ventilator/Non-Invasive Ventilation Settings (From admission, onward) Comment    None            Vital Signs  Temp:  [97 °F (36.1 °C)-99.4 °F (37.4 °C)] 97 °F (36.1 °C)  Heart Rate:  [] 67  Resp:  [16-18] 16  BP: ()/(58-92) 119/63    Intake/Output Summary (Last 24 hours) at 4/8/2021 1127  Last data filed at 4/8/2021 0000  Gross per 24 hour   Intake --   Output 1500 ml   Net -1500 ml     Flowsheet Rows      First Filed Value   Admission Height  170.2 cm (67\") Documented at 04/07/2021 1658   Admission Weight  106 kg (233 lb 11 oz) Documented at 04/07/2021 1655          Physical Exam:  Patient is examined using the personal protective equipment as per guidelines from infection control for this particular patient as enacted.  Hand hygiene was performed before and after patient interaction.   General Appearance:    Alert, cooperative, in no acute distress.  Following simple commands  Neck midline trachea, no thyromegaly   Lungs:     Clear to auscultation, respirations regular, even and                  unlabored    Heart:    Regular rhythm and normal rate, normal S1 and S2, no            murmur, no gallop, no rub, no click   Chest Wall:    No abnormalities observed   Abdomen:     Normal bowel sounds, no masses, no organomegaly, soft        " nontender, nondistended, no guarding, no rebound                tenderness   Extremities:   Moves all extremities well, no edema, no cyanosis, no             redness  CNS no focal neurological deficits normal sensory exam  Skin no rashes no nodules  Musculoskeletal no cyanosis no clubbing normal range of motion     Results Review:        Results from last 7 days   Lab Units 04/08/21  0102 04/07/21  1736   SODIUM mmol/L 142 136   POTASSIUM mmol/L 3.8 4.1   CHLORIDE mmol/L 110* 105   CO2 mmol/L 22.2 24.1   BUN mg/dL 6 8   CREATININE mg/dL 0.53* 0.53*   GLUCOSE mg/dL 77 94   CALCIUM mg/dL 7.8* 8.0*     Results from last 7 days   Lab Units 04/07/21  1736   TROPONIN T ng/mL <0.010     Results from last 7 days   Lab Units 04/08/21  0102 04/07/21  1736   WBC 10*3/mm3 5.58 4.44   HEMOGLOBIN g/dL 12.3 11.6*   HEMATOCRIT % 36.1 36.5   PLATELETS 10*3/mm3 238 269     Results from last 7 days   Lab Units 04/07/21  1736   INR  1.02   APTT seconds 27.5     Results from last 7 days   Lab Units 04/08/21  0102   CHOLESTEROL mg/dL 114     Results from last 7 days   Lab Units 04/08/21  0102   MAGNESIUM mg/dL 1.7     Results from last 7 days   Lab Units 04/08/21  0102   CHOLESTEROL mg/dL 114   TRIGLYCERIDES mg/dL 42   HDL CHOL mg/dL 55   LDL CHOL mg/dL 48           I reviewed the patient's new clinical results.  I personally viewed and interpreted the patient's chest x-ray.        Medication Review:   aspirin, 325 mg, Oral, Daily   Or  aspirin, 300 mg, Rectal, Daily  atorvastatin, 80 mg, Oral, Nightly  sodium chloride, 10 mL, Intravenous, Q12H             ASSESSMENT:   Acute right-sided weakness, negative CT head and previous negative MRI  History ADD and major depression  Mild persistent or moderate persistent asthma without exacerbation  Obesity  COVID-19 infection    PLAN:  Status post TPA and discussed with neurology.  They feel she may have had a migraine attack.  Neurochecks remains stable.  COVID-19 infection currently on room  air.  No indication for steroids or remdesivir.  We will continue symptomatic treatment.  Asthma remained stable with no exacerbation.  Obesity and risk for sleep apnea will need outpatient sleep study.  Possibly discharge in a.m. if remains stable.    Yahaira Arreguin MD  04/08/21  11:27 EDT

## 2021-04-09 ENCOUNTER — READMISSION MANAGEMENT (OUTPATIENT)
Dept: CALL CENTER | Facility: HOSPITAL | Age: 31
End: 2021-04-09

## 2021-04-09 VITALS
RESPIRATION RATE: 14 BRPM | HEART RATE: 62 BPM | BODY MASS INDEX: 36.57 KG/M2 | WEIGHT: 233 LBS | TEMPERATURE: 97.1 F | DIASTOLIC BLOOD PRESSURE: 74 MMHG | OXYGEN SATURATION: 93 % | HEIGHT: 67 IN | SYSTOLIC BLOOD PRESSURE: 99 MMHG

## 2021-04-09 LAB
CENTROMERE B AB SER-ACNC: <0.2 AI (ref 0–0.9)
CHROMATIN AB SERPL-ACNC: <0.2 AI (ref 0–0.9)
DSDNA AB SER-ACNC: 1 IU/ML (ref 0–9)
ENA JO1 AB SER-ACNC: <0.2 AI (ref 0–0.9)
ENA RNP AB SER-ACNC: <0.2 AI (ref 0–0.9)
ENA SCL70 AB SER-ACNC: <0.2 AI (ref 0–0.9)
ENA SM AB SER-ACNC: <0.2 AI (ref 0–0.9)
ENA SS-A AB SER-ACNC: <0.2 AI (ref 0–0.9)
ENA SS-B AB SER-ACNC: <0.2 AI (ref 0–0.9)
GLUCOSE BLDC GLUCOMTR-MCNC: 117 MG/DL (ref 70–130)
Lab: NORMAL

## 2021-04-09 PROCEDURE — 82962 GLUCOSE BLOOD TEST: CPT

## 2021-04-09 PROCEDURE — 99231 SBSQ HOSP IP/OBS SF/LOW 25: CPT | Performed by: PSYCHIATRY & NEUROLOGY

## 2021-04-09 RX ORDER — ASPIRIN 325 MG
325 TABLET ORAL DAILY
Qty: 30 TABLET | Refills: 1 | Status: SHIPPED | OUTPATIENT
Start: 2021-04-10 | End: 2022-05-09 | Stop reason: HOSPADM

## 2021-04-09 RX ORDER — CALCIUM CARBONATE 200(500)MG
2 TABLET,CHEWABLE ORAL 3 TIMES DAILY PRN
Status: DISCONTINUED | OUTPATIENT
Start: 2021-04-09 | End: 2021-04-09 | Stop reason: HOSPADM

## 2021-04-09 RX ADMIN — SODIUM CHLORIDE, PRESERVATIVE FREE 10 ML: 5 INJECTION INTRAVENOUS at 08:37

## 2021-04-09 RX ADMIN — ANTACID TABLETS 2 TABLET: 500 TABLET, CHEWABLE ORAL at 03:13

## 2021-04-09 NOTE — DISCHARGE INSTR - APPOINTMENTS
Please follow up with Neurology out patient.   You were seen by Dr. Chatman during your inpatient stay  Office number- [phone]

## 2021-04-09 NOTE — PLAN OF CARE
Goal Outcome Evaluation:  Plan of Care Reviewed With: patient  Progress: improving  Outcome Summary: No neuro changes, 24 hour CT negative. VSS overnight.

## 2021-04-09 NOTE — PAYOR COMM NOTE
"Paris Mejia (30 y.o. Female)                         ATTENTION;     DC SUMMARY FOR REVIEW,    REPLY TO UR DEPT  [phone]             Date of Birth Social Security Number Address Home Phone MRN    1990  [address] [place] [address] [id number] [phone] [id number]    Mandaen Marital Status          Amish Single       Admission Date Admission Type Admitting Provider Attending Provider Department, Room/Bed    21 Emergency Gabe Owens MD  [facility] INTENSIVE CARE, I383/    Discharge Date Discharge Disposition Discharge Destination        2021 Home or Self Care              Attending Provider: (none)   Allergies: [place], Nuts, Amoxicillin, Morphine, Nsaids, Other, Penicillins, Sulfa Antibiotics, Insulin Lispro    Isolation: Enh Drop/Con   Infection: COVID (confirmed) (21)   Code Status: CPR    Ht: 170.2 cm (67\")   Wt: 106 kg (233 lb)    Admission Cmt: None   Principal Problem: None                Active Insurance as of 2021     Primary Coverage     Payor Plan Insurance Group Employer/Plan Group    [facility] BY [facility]OB [id number]     Payor Plan Address Payor Plan Phone Number Payor Plan Fax Number Effective Dates    [address]   2021 - None Entered    [facility] [id number]       Subscriber Name Subscriber Birth Date Member ID       PARIS MEJIA 1990 [id number]                 Emergency Contacts      (Rel.) Home Phone Work Phone Mobile Phone    zoltan elliott (Significant Other) [phone] -- [phone]               Discharge Summary      Yahaira Arreguin MD at 21 1031            PHYSICIAN DISCHARGE SUMMARY                                                                        [facility]    Patient Identification:  Name: Paris Mejia  Age: 30 y.o.  Sex: female  :  1990  MRN: [id number]  Primary Care Physician: Provider, No Known    Admit date: " 4/7/2021  Discharge date and time: No discharge date for patient encounter.   Discharged Condition: stable    Discharge Diagnoses:  Cerebrovascular accident (CVA) (CMS/HCC)   Acute right-sided weakness, negative CT head and previous negative MRI  History ADD and major depression  Mild persistent or moderate persistent asthma without exacerbation  Obesity  COVID-19 infection       Hospital Course: Rosana Farnsworth presented to [facility]    Patient admitted with right-sided weakness strokelike symptoms which resolved with subsequent headache seen by neurologist felt to be apparent migraine with aura.  Received partial TPA dose stopped due to headache.  MRI brain CT head normal.  Patient was incidentally noted to be Covid positive with no respiratory symptoms.  Currently on room air and chest x-ray negative for significant pneumonia.  Patient has been cleared by neurology to be discharged.  They recommended echo inpatient versus outpatient home on aspirin and outpatient neurology follow-up.  Consults:   IP CONSULT TO NEUROLOGY  IP CONSULT TO NEUROLOGY  IP CONSULT TO PULMONOLOGY  IP CONSULT TO NEURO CLINICAL SPECIALIST  IP CONSULT TO REHAB ADMISSION  IP CONSULT TO CASE MANAGEMENT     Significant Diagnostic Studies:   Imaging Results (All)     Procedure Component Value Units Date/Time    CT Head Without Contrast [[id number]] Collected: 04/08/21 1959     Updated: 04/08/21 2005    Narrative:      HEAD CT     HISTORY: Follow-up clinically diagnosed stroke; no imaging findings of  stroke on the workup yesterday.     TECHNIQUE: Radiation dose reduction techniques were utilized, including  automated exposure control and exposure modulation based on body size.  Noncontrast head CT is provided. Correlation: Brain MRI and head CT  April 7, 2021.     FINDINGS: The ventricles are normal in caliber. The brain parenchyma and  extra-axial spaces appear normal. There is no hemorrhage or  acute  ischemic change. The bones of the skull appear normal. The mastoid air  cells, middle ears, and visualized paranasal sinuses are clear.       Impression:      Negative head CT.     This report was finalized on 4/8/2021 8:02 PM by Dr. Jerson Roberson M.D.       MRI Brain With & Without Contrast [[id number]] Collected: 04/07/21 2134     Updated: 04/07/21 2144    Narrative:      BRAIN MRI WITH AND WITHOUT CONTRAST     HISTORY: stroke; I63.9-Cerebral infarction, unspecified     COMPARISON: 04/07/2021.     FINDINGS:  Multiplanar images of the head were obtained without and with  gadolinium. No areas of restricted diffusion are seen to suggest acute  infarct. The ventricles are normal in size. There is no midline shift or  mass effect. There is really no significant microangiopathic change.  There is no evidence of hemorrhage on gradient-echo imaging.  Postcontrast imaging does not show any suspicious enhancement or  evidence of venous occlusion. There is mucosal thickening identified  within the ethmoid and maxillary sinuses, with mucus retention cyst  noted within the maxillary sinuses.       Impression:      1. No acute intracranial abnormality, no abnormal enhancement.     This report was finalized on 4/7/2021 9:41 PM by Dr. Susan Reyes M.D.       CT Head Without Contrast [[id number]] Collected: 04/07/21 1830     Updated: 04/07/21 1840    Narrative:      CT HEAD WO CONTRAST-     INDICATIONS: Neurologic deficit     TECHNIQUE: Radiation dose reduction techniques were utilized, including  automated exposure control and exposure modulation based on body size.  Noncontrast head CT at 1825 hours on 04/07/2021     COMPARISON: Enhanced CT from 04/07/2021 at 1727 hours     FINDINGS:     Residual intravascular contrast material is present, that could somewhat  limit assessment for subtle subarachnoid hemorrhage, with no obvious  space-occupying intracranial hemorrhage identified.     No midline shift or mass  effect. No acute territorial infarct is  identified.      Ventricles, cisterns, cerebral sulci are unremarkable for patient age.     Small likely mucous retention cysts in the maxillary sinuses. The  visualized paranasal sinuses, orbits, mastoid air cells are otherwise  unremarkable.             Impression:         No significant change identified. Consider MRI for further evaluation.     This report was finalized on 4/7/2021 6:37 PM by Dr. Bradly Baez M.D.       XR Chest 1 View [[id number]] Collected: 04/07/21 1753     Updated: 04/07/21 1758    Narrative:      AP CHEST     HISTORY: Acute stroke protocol     COMPARISON: 08/09/2016     FINDINGS: Vague density in the left lung base may reflect atelectasis or  infiltrate. Heart size normal. No pneumothorax. No acute bony  abnormality.       Impression:      Vague density in the left base may reflect atelectasis or infiltrate     This report was finalized on 4/7/2021 5:54 PM by Dr. Tyson Hernandez M.D.       CT Angiogram Head [[id number]] Collected: 04/07/21 1741     Updated: 04/07/21 1747    Narrative:      CT ANGIOGRAM HEAD-, CT CEREBRAL PERFUSION W WO CONTRAST W AI ANALYSIS OF  LVO-, CT ANGIOGRAM NECK-     CT ANGIOGRAM HEAD AND NECK AND CT PERFUSION STUDY     CLINICAL HISTORY: Stroke        Radiation dose reduction techniques were utilized, including automated  exposure control and exposure modulation based on body size. CT scan of  the head was obtained with 3 mm axial images without the use  of IV contrast. The patient underwent a CT  perfusion study with a dynamic bolus of IV contrast. Standard perfusion  maps were constructed. The patient then underwent a CT angiogram of the  head and neck with 1 mm axial images following the administration of IV  contrast. Sagittal, coronal, and 3-dimensional reconstructed images were  obtained.  Percent stenosis was assessed in accordance with NASCET  criteria.     FINDINGS:     NONCONTRAST HEAD CT: On the noncontrast  head CT, gray-white matter  differentiation is preserved, and no acute hemorrhage or hydrocephalus  is identified. Small likely mucous retention cyst in right maxillary  sinus.     No enhancing lesions of brain.        CT PERFUSION STUDY:  No CBF (under 30%) deficit or perfusion abnormality  is demonstrated where the T MAX is greater than 6 seconds. The  calculated mismatch volume was 0 cc.     CTA HEAD and neck: The CT angiogram of the head and neck demonstrates no  hemodynamically significant focal stenosis, aneurysm, or dissection in  the cervical carotid or vertebral arteries, or in the arteries at the  base of the brain.             Impression:            1. No perfusion abnormality to suggest completed or threatened acute  infarct. If indicated, MRI could be considered for further evaluation.  2. No arterial cutoff or high-grade arterial stenosis identified.  3. No acute intracranial hemorrhage or hydrocephalus. No enhancing  lesions of brain.           Discussed by telephone with 1727, 1739, Rachelle Duff at 1740,  04/07/2021.     This report was finalized on 4/7/2021 5:44 PM by Dr. Bradly Baez M.D.       CT Angiogram Neck [[id number]] Collected: 04/07/21 1741     Updated: 04/07/21 1747    Narrative:      CT ANGIOGRAM HEAD-, CT CEREBRAL PERFUSION W WO CONTRAST W AI ANALYSIS OF  LVO-, CT ANGIOGRAM NECK-     CT ANGIOGRAM HEAD AND NECK AND CT PERFUSION STUDY     CLINICAL HISTORY: Stroke        Radiation dose reduction techniques were utilized, including automated  exposure control and exposure modulation based on body size. CT scan of  the head was obtained with 3 mm axial images without the use  of IV contrast. The patient underwent a CT  perfusion study with a dynamic bolus of IV contrast. Standard perfusion  maps were constructed. The patient then underwent a CT angiogram of the  head and neck with 1 mm axial images following the administration of IV  contrast. Sagittal, coronal, and 3-dimensional  reconstructed images were  obtained.  Percent stenosis was assessed in accordance with NASCET  criteria.     FINDINGS:     NONCONTRAST HEAD CT: On the noncontrast head CT, gray-white matter  differentiation is preserved, and no acute hemorrhage or hydrocephalus  is identified. Small likely mucous retention cyst in right maxillary  sinus.     No enhancing lesions of brain.        CT PERFUSION STUDY:  No CBF (under 30%) deficit or perfusion abnormality  is demonstrated where the T MAX is greater than 6 seconds. The  calculated mismatch volume was 0 cc.     CTA HEAD and neck: The CT angiogram of the head and neck demonstrates no  hemodynamically significant focal stenosis, aneurysm, or dissection in  the cervical carotid or vertebral arteries, or in the arteries at the  base of the brain.             Impression:            1. No perfusion abnormality to suggest completed or threatened acute  infarct. If indicated, MRI could be considered for further evaluation.  2. No arterial cutoff or high-grade arterial stenosis identified.  3. No acute intracranial hemorrhage or hydrocephalus. No enhancing  lesions of brain.           Discussed by telephone with 1727, 1739, Rachelle Duff at 1740,  04/07/2021.     This report was finalized on 4/7/2021 5:44 PM by Dr. Bradly Baez M.D.       CT CEREBRAL PERFUSION W WO CONTRAST W AI ANALYSIS OF LVO [[id number]] Collected: 04/07/21 1741     Updated: 04/07/21 1747    Narrative:      CT ANGIOGRAM HEAD-, CT CEREBRAL PERFUSION W WO CONTRAST W AI ANALYSIS OF  LVO-, CT ANGIOGRAM NECK-     CT ANGIOGRAM HEAD AND NECK AND CT PERFUSION STUDY     CLINICAL HISTORY: Stroke        Radiation dose reduction techniques were utilized, including automated  exposure control and exposure modulation based on body size. CT scan of  the head was obtained with 3 mm axial images without the use  of IV contrast. The patient underwent a CT  perfusion study with a dynamic bolus of IV contrast. Standard  perfusion  maps were constructed. The patient then underwent a CT angiogram of the  head and neck with 1 mm axial images following the administration of IV  contrast. Sagittal, coronal, and 3-dimensional reconstructed images were  obtained.  Percent stenosis was assessed in accordance with NASCET  criteria.     FINDINGS:     NONCONTRAST HEAD CT: On the noncontrast head CT, gray-white matter  differentiation is preserved, and no acute hemorrhage or hydrocephalus  is identified. Small likely mucous retention cyst in right maxillary  sinus.     No enhancing lesions of brain.        CT PERFUSION STUDY:  No CBF (under 30%) deficit or perfusion abnormality  is demonstrated where the T MAX is greater than 6 seconds. The  calculated mismatch volume was 0 cc.     CTA HEAD and neck: The CT angiogram of the head and neck demonstrates no  hemodynamically significant focal stenosis, aneurysm, or dissection in  the cervical carotid or vertebral arteries, or in the arteries at the  base of the brain.             Impression:            1. No perfusion abnormality to suggest completed or threatened acute  infarct. If indicated, MRI could be considered for further evaluation.  2. No arterial cutoff or high-grade arterial stenosis identified.  3. No acute intracranial hemorrhage or hydrocephalus. No enhancing  lesions of brain.           Discussed by telephone with 1727, 1739, Rachelle Duff at 1740,  04/07/2021.     This report was finalized on 4/7/2021 5:44 PM by Dr. Bradly Baez M.D.             Discharge Exam:  Alert and oriented x 4, in NAD  Supple neck, midline trach  RRR, no m/r/g, no edema  Clear bilaterally, no wheezing, nonlabored  No clubbing or cyanosis     Disposition:  Home    Patient Instructions:   Follow-up Information     Provider, No Known .    Contact information:  [facility] [address] [phone]                 Discharge Orders  JavadRosana stoll CASSANDRA (MRN [id number])    aspirin tablet  [681] (Medications)     Order Class Sig Dispense Dispense As Written Refill    Normal Take 1 tablet by mouth Daily. 30 tablet No 1    Exception code Route Start Date End Date Order Date     Oral 04/10/21  04/09/21 1030                    Discharge Medications      New Medications      Instructions Start Date   aspirin 325 MG tablet   325 mg, Oral, Daily   Start Date: April 10, 2021        Continue These Medications      Instructions Start Date   albuterol sulfate  (90 Base) MCG/ACT inhaler  Commonly known as: PROVENTIL HFA;VENTOLIN HFA;PROAIR HFA   2 puffs, Inhalation, Every 4 Hours PRN      butalbital-acetaminophen-caffeine -40 MG per tablet  Commonly known as: FIORICET, ESGIC   1 tablet, Oral, Every 6 Hours PRN      CALCIUM 1200 PO   Oral      clonazePAM 0.5 MG tablet  Commonly known as: KlonoPIN   0.5 mg, Oral, 2 Times Daily PRN      ferrous sulfate 325 (65 FE) MG tablet   325 mg, Oral, Daily With Breakfast      HYDROcodone-acetaminophen 7.5-325 MG per tablet  Commonly known as: NORCO   1 tablet, Oral, Every 4 Hours PRN      L-Methylfolate-Algae 7.5-90.314 MG capsule   1 tablet, Oral, Daily      metaxalone 800 MG tablet  Commonly known as: SKELAXIN   800 mg, Oral, 3 Times Daily PRN      prenatal (CLASSIC) vitamin  tablet  Generic drug: prenatal vitamin   Oral, Daily      Pristiq 100 MG 24 hr tablet  Generic drug: desvenlafaxine   100 mg, Oral, Daily         Stop These Medications    levoFLOXacin 750 MG tablet  Commonly known as: Levaquin     predniSONE 5 MG tablet  Commonly known as: DELTASONE     predniSONE 50 MG tablet  Commonly known as: DELTASONE            Total time spent discharging patient including evaluation, medication reconciliation, arranging follow up, and post hospitalization instructions and education total time exceeds 30 minutes.    Signed:  Yahaira Arreguin MD  4/9/2021  10:31 EDT    Electronically signed by Yahaira Arreguin MD at 04/09/21 1033

## 2021-04-09 NOTE — PROGRESS NOTES
Neurology Note    Patient:  Rosana Farnsworth    YOB: 1990    REFERRING PHYSICIAN:  Gabe Owens MD    CHIEF COMPLAINT:    Weakness, headache    HISTORY OF PRESENT ILLNESS:   The patient is headache free, weakness resolved, afebrile, no new concerns. Does not want to take daily aspirin given h/o stomach surgery.    Past Medical History:  Past Medical History:   Diagnosis Date   • Asthma    • Stroke (CMS/HCC)        Past Surgical History:  Past Surgical History:   Procedure Laterality Date   •  SECTION     • CHOLECYSTECTOMY         Social History:   Social History     Socioeconomic History   • Marital status: Single     Spouse name: Not on file   • Number of children: Not on file   • Years of education: Not on file   • Highest education level: Not on file   Tobacco Use   • Smoking status: Former Smoker   Substance and Sexual Activity   • Alcohol use: No        Family History:   History reviewed. No pertinent family history.    Medications Prior to Admission:    Prior to Admission medications    Medication Sig Start Date End Date Taking? Authorizing Provider   Calcium Carbonate-Vit D-Min (CALCIUM 1200 PO) Take  by mouth.   Yes Jc Lawrence MD   clonazePAM (KlonoPIN) 0.5 MG tablet Take 0.5 mg by mouth 2 (Two) Times a Day As Needed for Seizures.   Yes Jc Lawrence MD   desvenlafaxine (PRISTIQ) 100 MG 24 hr tablet Take 100 mg by mouth Daily.   Yes ProviderJc MD   ferrous sulfate 325 (65 FE) MG tablet Take 325 mg by mouth Daily With Breakfast.   Yes ProviderJc MD   albuterol (PROVENTIL HFA;VENTOLIN HFA) 108 (90 BASE) MCG/ACT inhaler Inhale 2 puffs every 4 (four) hours as needed for wheezing. 16   Dustin Palacios MD   aspirin 325 MG tablet Take 1 tablet by mouth Daily. 4/10/21   Yahaira Arreguin MD   butalbital-acetaminophen-caffeine (FIORICET, ESGIC) -40 MG per tablet Take 1 tablet by mouth Every 6 (Six) Hours As Needed for Headache. 18    Mukund Souza MD   HYDROcodone-acetaminophen (NORCO) 7.5-325 MG per tablet Take 1 tablet by mouth Every 4 (Four) Hours As Needed for Moderate Pain . 4/18/19   Dustin Palacios MD   L-Methylfolate-Algae 7.5-90.314 MG capsule Take 1 tablet by mouth Daily.    Provider, MD Jc   levoFLOXacin (LEVAQUIN) 750 MG tablet Take 1 tablet by mouth Daily. 11/7/18   Mukund Suoza MD   metaxalone (SKELAXIN) 800 MG tablet Take 1 tablet by mouth 3 (Three) Times a Day As Needed for Muscle Spasms. 4/18/19   Dustin Palacios MD   predniSONE (DELTASONE) 5 MG tablet Take 5 mg by mouth Daily.    ProviderJc MD   predniSONE (DELTASONE) 50 MG tablet Take 1 tablet by mouth Daily. 4/18/19   Dustin Palacios MD   Prenatal Vit-Fe Fumarate-FA (PRENATAL, CLASSIC, VITAMIN) 28-0.8 MG tablet tablet Take  by mouth daily.    Provider, MD Jc       Allergies:  Roulette, Nuts, Amoxicillin, Morphine, Nsaids, Other, Penicillins, Sulfa antibiotics, and Insulin lispro      Review of system  Review of Systems   Neurological: Negative for weakness and headaches.       Vitals:    04/09/21 0823   BP:    Pulse:    Resp:    Temp: 97.1 °F (36.2 °C)   SpO2:        Physical exam  Physical Exam  Not examined to conserve PPE and to limit the spread of COVID.    Lab Results   Component Value Date    WBC 5.58 04/08/2021    HGB 12.3 04/08/2021    HCT 36.1 04/08/2021    MCV 79.9 04/08/2021     04/08/2021     Lab Results   Component Value Date    GLUCOSE 77 04/08/2021    BUN 6 04/08/2021    CREATININE 0.53 (L) 04/08/2021    EGFRIFNONA 137 04/08/2021    BCR 11.3 04/08/2021    CO2 22.2 04/08/2021    CALCIUM 7.8 (L) 04/08/2021    ALBUMIN 3.50 04/08/2021    LABIL2 1.2 09/17/2020    AST 15 04/08/2021    ALT 16 04/08/2021     Echo Complete w/Doppler and Color Flow  Order# [id number]  Reading physician: Rachell Villar MD Ordering physician: Fox Chatman MD Study date: 4/8/21   Patient Information    Patient  Name   Rosana Farnsworth MRN   [id number] Legal Sex   Female  (Age)   1990 (30 y.o.)   Admission Information    Admission Date/Time Discharge Date/Time Room/Bed   21  04:58 PM  I383/1   PACS Images     Show images for Adult Transthoracic Echo Complete W/ Cont if Necessary Per Protocol    Sedation Narrator Report    Sedation Narrator Report      Interpretation Summary    · Estimated left ventricular EF = 67% Left ventricular systolic function is normal.  · Left ventricular diastolic function was normal.  · Small patent foramen ovale present. Saline test results are positive.  · Trace tricuspid valve regurgitation is present. Estimated right ventricular systolic pressure from tricuspid regurgitation is normal (<35 mmHg). Calculated right ventricular systolic pressure from tricuspid regurgitation is 21 mmHg.            HEAD CT     HISTORY: Follow-up clinically diagnosed stroke; no imaging findings of  stroke on the workup yesterday.     TECHNIQUE: Radiation dose reduction techniques were utilized, including  automated exposure control and exposure modulation based on body size.  Noncontrast head CT is provided. Correlation: Brain MRI and head CT  2021.     FINDINGS: The ventricles are normal in caliber. The brain parenchyma and  extra-axial spaces appear normal. There is no hemorrhage or acute  ischemic change. The bones of the skull appear normal. The mastoid air  cells, middle ears, and visualized paranasal sinuses are clear.     IMPRESSION:  Negative head CT.     This report was finalized on 2021 8:02 PM by Dr. Jerson Roberson M.D.           During this visit the following were done:  Labs Reviewed [x]    Labs Ordered []    Radiology Reports Reviewed [x]    Radiology Ordered []    EKG, echo, and/or stress test reviewed [x]    EEG results reviewed  []    EEG reviewed and interpreted per myself   []    Discussed case with neurointerventionalist or neuroradiologist []    Referring Provider  Records Reviewed []    ER Records Reviewed []    Hospital Records Reviewed []    History Obtained From Family []    Radiological images view and Interpreted per myself [x]    Case Discussed with referring provider []     Decision to obtain and request outside records  []        Assessment and Plan     Stroke-like symptoms, resolved, subsequent headache, all symptoms resolved. Apparent migraine with aura. Received partial TPA dose, stopped 22 headache. MRI brain, CTA normal, personally reviewed. COVID-19 positive, no fever.   - Neurologically stable for discharge.   - Take Excedrin prn recurrent symptoms.   - Oupatient neurology F/U.       Thanks,          Electronically signed by Fox Chatman MD on 4/9/2021 at 10:40 EDT

## 2021-04-09 NOTE — OUTREACH NOTE
Prep Survey      Responses   [facility] patient discharged from?  [facility]   Is LACE score < 7 ?  No   Emergency Room discharge w/ pulse ox?  No   Eligibility  Readm Mgmt   Discharge diagnosis  CVA)  COVID-19 infection   Does the patient have one of the following disease processes/diagnoses(primary or secondary)?  Stroke (TIA)   Does the patient have Home health ordered?  No   Is there a DME ordered?  No   Prep survey completed?  Yes          Khloe Clay RN

## 2021-04-09 NOTE — DISCHARGE SUMMARY
PHYSICIAN DISCHARGE SUMMARY                                                                        [facility]    Patient Identification:  Name: Rosana Farnsworth  Age: 30 y.o.  Sex: female  :  1990  MRN: [id number]  Primary Care Physician: Provider, No Known    Admit date: 2021  Discharge date and time: No discharge date for patient encounter.   Discharged Condition: stable    Discharge Diagnoses:  Cerebrovascular accident (CVA) (CMS/HCC)   Acute right-sided weakness, negative CT head and previous negative MRI  History ADD and major depression  Mild persistent or moderate persistent asthma without exacerbation  Obesity  COVID-19 infection       Hospital Course: Rosana Farnsworth presented to [facility]    Patient admitted with right-sided weakness strokelike symptoms which resolved with subsequent headache seen by neurologist felt to be apparent migraine with aura.  Received partial TPA dose stopped due to headache.  MRI brain CT head normal.  Patient was incidentally noted to be Covid positive with no respiratory symptoms.  Currently on room air and chest x-ray negative for significant pneumonia.  Patient has been cleared by neurology to be discharged.  They recommended echo inpatient versus outpatient home on aspirin and outpatient neurology follow-up.  Consults:   IP CONSULT TO NEUROLOGY  IP CONSULT TO NEUROLOGY  IP CONSULT TO PULMONOLOGY  IP CONSULT TO NEURO CLINICAL SPECIALIST  IP CONSULT TO REHAB ADMISSION  IP CONSULT TO CASE MANAGEMENT     Significant Diagnostic Studies:   Imaging Results (All)     Procedure Component Value Units Date/Time    CT Head Without Contrast [[id number]] Collected: 21     Updated: 21    Narrative:      HEAD CT     HISTORY: Follow-up clinically diagnosed stroke; no imaging findings of  stroke on the workup yesterday.     TECHNIQUE: Radiation dose reduction techniques were utilized,  including  automated exposure control and exposure modulation based on body size.  Noncontrast head CT is provided. Correlation: Brain MRI and head CT  April 7, 2021.     FINDINGS: The ventricles are normal in caliber. The brain parenchyma and  extra-axial spaces appear normal. There is no hemorrhage or acute  ischemic change. The bones of the skull appear normal. The mastoid air  cells, middle ears, and visualized paranasal sinuses are clear.       Impression:      Negative head CT.     This report was finalized on 4/8/2021 8:02 PM by Dr. Jerson Roberson M.D.       MRI Brain With & Without Contrast [[id number]] Collected: 04/07/21 2134     Updated: 04/07/21 2144    Narrative:      BRAIN MRI WITH AND WITHOUT CONTRAST     HISTORY: stroke; I63.9-Cerebral infarction, unspecified     COMPARISON: 04/07/2021.     FINDINGS:  Multiplanar images of the head were obtained without and with  gadolinium. No areas of restricted diffusion are seen to suggest acute  infarct. The ventricles are normal in size. There is no midline shift or  mass effect. There is really no significant microangiopathic change.  There is no evidence of hemorrhage on gradient-echo imaging.  Postcontrast imaging does not show any suspicious enhancement or  evidence of venous occlusion. There is mucosal thickening identified  within the ethmoid and maxillary sinuses, with mucus retention cyst  noted within the maxillary sinuses.       Impression:      1. No acute intracranial abnormality, no abnormal enhancement.     This report was finalized on 4/7/2021 9:41 PM by Dr. Susan Reyes M.D.       CT Head Without Contrast [[id number]] Collected: 04/07/21 1830     Updated: 04/07/21 1840    Narrative:      CT HEAD WO CONTRAST-     INDICATIONS: Neurologic deficit     TECHNIQUE: Radiation dose reduction techniques were utilized, including  automated exposure control and exposure modulation based on body size.  Noncontrast head CT at 1825 hours on 04/07/2021      COMPARISON: Enhanced CT from 04/07/2021 at 1727 hours     FINDINGS:     Residual intravascular contrast material is present, that could somewhat  limit assessment for subtle subarachnoid hemorrhage, with no obvious  space-occupying intracranial hemorrhage identified.     No midline shift or mass effect. No acute territorial infarct is  identified.      Ventricles, cisterns, cerebral sulci are unremarkable for patient age.     Small likely mucous retention cysts in the maxillary sinuses. The  visualized paranasal sinuses, orbits, mastoid air cells are otherwise  unremarkable.             Impression:         No significant change identified. Consider MRI for further evaluation.     This report was finalized on 4/7/2021 6:37 PM by Dr. Bradly Baez M.D.       XR Chest 1 View [[id number]] Collected: 04/07/21 1753     Updated: 04/07/21 1758    Narrative:      AP CHEST     HISTORY: Acute stroke protocol     COMPARISON: 08/09/2016     FINDINGS: Vague density in the left lung base may reflect atelectasis or  infiltrate. Heart size normal. No pneumothorax. No acute bony  abnormality.       Impression:      Vague density in the left base may reflect atelectasis or infiltrate     This report was finalized on 4/7/2021 5:54 PM by Dr. Tyson Hernandez M.D.       CT Angiogram Head [[id number]] Collected: 04/07/21 1741     Updated: 04/07/21 1747    Narrative:      CT ANGIOGRAM HEAD-, CT CEREBRAL PERFUSION W WO CONTRAST W AI ANALYSIS OF  LVO-, CT ANGIOGRAM NECK-     CT ANGIOGRAM HEAD AND NECK AND CT PERFUSION STUDY     CLINICAL HISTORY: Stroke        Radiation dose reduction techniques were utilized, including automated  exposure control and exposure modulation based on body size. CT scan of  the head was obtained with 3 mm axial images without the use  of IV contrast. The patient underwent a CT  perfusion study with a dynamic bolus of IV contrast. Standard perfusion  maps were constructed. The patient then underwent a CT  angiogram of the  head and neck with 1 mm axial images following the administration of IV  contrast. Sagittal, coronal, and 3-dimensional reconstructed images were  obtained.  Percent stenosis was assessed in accordance with NASCET  criteria.     FINDINGS:     NONCONTRAST HEAD CT: On the noncontrast head CT, gray-white matter  differentiation is preserved, and no acute hemorrhage or hydrocephalus  is identified. Small likely mucous retention cyst in right maxillary  sinus.     No enhancing lesions of brain.        CT PERFUSION STUDY:  No CBF (under 30%) deficit or perfusion abnormality  is demonstrated where the T MAX is greater than 6 seconds. The  calculated mismatch volume was 0 cc.     CTA HEAD and neck: The CT angiogram of the head and neck demonstrates no  hemodynamically significant focal stenosis, aneurysm, or dissection in  the cervical carotid or vertebral arteries, or in the arteries at the  base of the brain.             Impression:            1. No perfusion abnormality to suggest completed or threatened acute  infarct. If indicated, MRI could be considered for further evaluation.  2. No arterial cutoff or high-grade arterial stenosis identified.  3. No acute intracranial hemorrhage or hydrocephalus. No enhancing  lesions of brain.           Discussed by telephone with 1727, 1739, Rachelle Duff at 1740,  04/07/2021.     This report was finalized on 4/7/2021 5:44 PM by Dr. Bradly Baez M.D.       CT Angiogram Neck [[id number]] Collected: 04/07/21 1741     Updated: 04/07/21 1747    Narrative:      CT ANGIOGRAM HEAD-, CT CEREBRAL PERFUSION W WO CONTRAST W AI ANALYSIS OF  LVO-, CT ANGIOGRAM NECK-     CT ANGIOGRAM HEAD AND NECK AND CT PERFUSION STUDY     CLINICAL HISTORY: Stroke        Radiation dose reduction techniques were utilized, including automated  exposure control and exposure modulation based on body size. CT scan of  the head was obtained with 3 mm axial images without the use  of IV  contrast. The patient underwent a CT  perfusion study with a dynamic bolus of IV contrast. Standard perfusion  maps were constructed. The patient then underwent a CT angiogram of the  head and neck with 1 mm axial images following the administration of IV  contrast. Sagittal, coronal, and 3-dimensional reconstructed images were  obtained.  Percent stenosis was assessed in accordance with NASCET  criteria.     FINDINGS:     NONCONTRAST HEAD CT: On the noncontrast head CT, gray-white matter  differentiation is preserved, and no acute hemorrhage or hydrocephalus  is identified. Small likely mucous retention cyst in right maxillary  sinus.     No enhancing lesions of brain.        CT PERFUSION STUDY:  No CBF (under 30%) deficit or perfusion abnormality  is demonstrated where the T MAX is greater than 6 seconds. The  calculated mismatch volume was 0 cc.     CTA HEAD and neck: The CT angiogram of the head and neck demonstrates no  hemodynamically significant focal stenosis, aneurysm, or dissection in  the cervical carotid or vertebral arteries, or in the arteries at the  base of the brain.             Impression:            1. No perfusion abnormality to suggest completed or threatened acute  infarct. If indicated, MRI could be considered for further evaluation.  2. No arterial cutoff or high-grade arterial stenosis identified.  3. No acute intracranial hemorrhage or hydrocephalus. No enhancing  lesions of brain.           Discussed by telephone with 1727, 1739, Rachelle Duff at 1740,  04/07/2021.     This report was finalized on 4/7/2021 5:44 PM by Dr. Bradly Baez M.D.       CT CEREBRAL PERFUSION W WO CONTRAST W AI ANALYSIS OF LVO [[id number]] Collected: 04/07/21 1741     Updated: 04/07/21 1747    Narrative:      CT ANGIOGRAM HEAD-, CT CEREBRAL PERFUSION W WO CONTRAST W AI ANALYSIS OF  LVO-, CT ANGIOGRAM NECK-     CT ANGIOGRAM HEAD AND NECK AND CT PERFUSION STUDY     CLINICAL HISTORY: Stroke        Radiation dose  reduction techniques were utilized, including automated  exposure control and exposure modulation based on body size. CT scan of  the head was obtained with 3 mm axial images without the use  of IV contrast. The patient underwent a CT  perfusion study with a dynamic bolus of IV contrast. Standard perfusion  maps were constructed. The patient then underwent a CT angiogram of the  head and neck with 1 mm axial images following the administration of IV  contrast. Sagittal, coronal, and 3-dimensional reconstructed images were  obtained.  Percent stenosis was assessed in accordance with NASCET  criteria.     FINDINGS:     NONCONTRAST HEAD CT: On the noncontrast head CT, gray-white matter  differentiation is preserved, and no acute hemorrhage or hydrocephalus  is identified. Small likely mucous retention cyst in right maxillary  sinus.     No enhancing lesions of brain.        CT PERFUSION STUDY:  No CBF (under 30%) deficit or perfusion abnormality  is demonstrated where the T MAX is greater than 6 seconds. The  calculated mismatch volume was 0 cc.     CTA HEAD and neck: The CT angiogram of the head and neck demonstrates no  hemodynamically significant focal stenosis, aneurysm, or dissection in  the cervical carotid or vertebral arteries, or in the arteries at the  base of the brain.             Impression:            1. No perfusion abnormality to suggest completed or threatened acute  infarct. If indicated, MRI could be considered for further evaluation.  2. No arterial cutoff or high-grade arterial stenosis identified.  3. No acute intracranial hemorrhage or hydrocephalus. No enhancing  lesions of brain.           Discussed by telephone with 1727, 1739, Rachelle Duff at 1740,  04/07/2021.     This report was finalized on 4/7/2021 5:44 PM by Dr. Bradly Baez M.D.             Discharge Exam:  Alert and oriented x 4, in NAD  Supple neck, midline trach  RRR, no m/r/g, no edema  Clear bilaterally, no wheezing,  nonlabored  No clubbing or cyanosis     Disposition:  Home    Patient Instructions:   Follow-up Information     Provider, No Known .    Contact information:  [facility] [address] [phone]                 Discharge Orders  Rosana Farnsworth (MRN [id number])    aspirin tablet [[id number]] (Medications)     Order Class Sig Dispense Dispense As Written Refill    Normal Take 1 tablet by mouth Daily. 30 tablet No 1    Exception code Route Start Date End Date Order Date     Oral 04/10/21  04/09/21 1030                    Discharge Medications      New Medications      Instructions Start Date   aspirin 325 MG tablet   325 mg, Oral, Daily   Start Date: April 10, 2021        Continue These Medications      Instructions Start Date   albuterol sulfate  (90 Base) MCG/ACT inhaler  Commonly known as: PROVENTIL HFA;VENTOLIN HFA;PROAIR HFA   2 puffs, Inhalation, Every 4 Hours PRN      butalbital-acetaminophen-caffeine -40 MG per tablet  Commonly known as: FIORICET, ESGIC   1 tablet, Oral, Every 6 Hours PRN      CALCIUM 1200 PO   Oral      clonazePAM 0.5 MG tablet  Commonly known as: KlonoPIN   0.5 mg, Oral, 2 Times Daily PRN      ferrous sulfate 325 (65 FE) MG tablet   325 mg, Oral, Daily With Breakfast      HYDROcodone-acetaminophen 7.5-325 MG per tablet  Commonly known as: NORCO   1 tablet, Oral, Every 4 Hours PRN      L-Methylfolate-Algae 7.5-90.314 MG capsule   1 tablet, Oral, Daily      metaxalone 800 MG tablet  Commonly known as: SKELAXIN   800 mg, Oral, 3 Times Daily PRN      prenatal (CLASSIC) vitamin  tablet  Generic drug: prenatal vitamin   Oral, Daily      Pristiq 100 MG 24 hr tablet  Generic drug: desvenlafaxine   100 mg, Oral, Daily         Stop These Medications    levoFLOXacin 750 MG tablet  Commonly known as: Levaquin     predniSONE 5 MG tablet  Commonly known as: DELTASONE     predniSONE 50 MG tablet  Commonly known as: DELTASONE            Total time spent discharging patient  including evaluation, medication reconciliation, arranging follow up, and post hospitalization instructions and education total time exceeds 30 minutes.    Signed:  Yahaira Arreguin MD  4/9/2021  10:31 EDT

## 2021-04-09 NOTE — SIGNIFICANT NOTE
04/09/21 1033   OTHER   Discipline physical therapist   Rehab Time/Intention   Session Not Performed other (see comments)  (spoke with RANDY Bradshaw, pt has no acute PT needs, anticipate d/c home today)

## 2021-04-10 LAB
LA 2 SCREEN W REFLEX-IMP: NORMAL
SCREEN APTT: 32.4 SEC (ref 0–51.9)
SCREEN DRVVT: 29 SEC (ref 0–47)

## 2021-04-12 LAB — CREAT BLDA-MCNC: 0.6 MG/DL (ref 0.6–1.3)

## 2021-04-13 ENCOUNTER — READMISSION MANAGEMENT (OUTPATIENT)
Dept: CALL CENTER | Facility: HOSPITAL | Age: 31
End: 2021-04-13

## 2021-04-13 NOTE — OUTREACH NOTE
Stroke Week 1 Survey      Responses   [facility] patient discharged from?  [place]   Does the patient have one of the following disease processes/diagnoses(primary or secondary)?  Stroke (TIA)   Week 1 attempt successful?  Yes   Call start time  1356   Call end time  1400   Meds reviewed with patient/caregiver?  Yes   Is the patient having any side effects they believe may be caused by any medication additions or changes?  No   Does the patient have all medications ordered at discharge?  Yes   Is the patient taking all medications as directed (includes completed medication regime)?  Yes   Does the patient have a primary care provider?   Yes   Does the patient have an appointment with their PCP within 7 days of discharge?  No   What is preventing the patient from scheduling follow up appointments within 7 days of discharge?  Haven't had time   Nursing Interventions  Educated patient on importance of making appointment, Advised patient to make appointment   Has the patient kept scheduled appointments due by today?  N/A   Psychosocial issues?  No   Does the patient require any assistance with activities of daily living such as eating, bathing, dressing, walking, etc.?  No   Does the patient have any residual symptoms from stroke/TIA?  No   Does the patient understand the diet ordered at discharge?  Yes   Did the patient receive a copy of their discharge instructions?  Yes   Nursing interventions  Reviewed instructions with patient   What is the patient's perception of their health status since discharge?  Improving   Nursing interventions  Nurse provided patient education   Is the patient able to teach back FAST for Stroke?  Yes   Is the patient/caregiver able to teach back the risk factors for a stroke?  High Cholesterol, Physical inactivity and obesity, High blood pressure-goal below 120/80, Smoking, Diabetes, Sleep apnea, History of TIAs, History of Afib   Is the patient/caregiver able to teach back signs  and symptoms related to disease process for when to call PCP?  Yes   Is the patient/caregiver able to teach back signs and symptoms related to disease process for when to call 911?  Yes   If the patient is a current smoker, are they able to teach back resources for cessation?  Not a smoker   Is the patient/caregiver able to teach back the hierarchy of who to call/visit for symptoms/problems? PCP, Specialist, Home health nurse, Urgent Care, ED, 911  Yes   Week 1 call completed?  Yes   Wrap up additional comments  Pt states she is doing better, no residual symptoms. No questions/concerns at thist times.          Nyasia Lyon RN

## 2021-04-20 ENCOUNTER — READMISSION MANAGEMENT (OUTPATIENT)
Dept: CALL CENTER | Facility: HOSPITAL | Age: 31
End: 2021-04-20

## 2021-04-20 NOTE — OUTREACH NOTE
Stroke Week 2 Survey      Responses   [facility] patient discharged from?  [place]   Does the patient have one of the following disease processes/diagnoses(primary or secondary)?  Stroke (TIA)   Week 2 attempt successful?  Yes   Call start time  1536   Call end time  1539   Discharge diagnosis  CVA)  COVID-19 infection   Meds reviewed with patient/caregiver?  Yes   Is the patient having any side effects they believe may be caused by any medication additions or changes?  No   Does the patient have all medications ordered at discharge?  Yes   Is the patient taking all medications as directed (includes completed medication regime)?  Yes   Does the patient have a primary care provider?   Yes   Does the patient have an appointment with their PCP within 7 days of discharge?  No   Comments regarding PCP  Nini-pt has not made appointment at this time.    What is preventing the patient from scheduling follow up appointments within 7 days of discharge?  Haven't had time   Nursing Interventions  Advised patient to make appointment   Has the patient kept scheduled appointments due by today?  Yes   Psychosocial issues?  No   Does the patient require any assistance with activities of daily living such as eating, bathing, dressing, walking, etc.?  No   Does the patient have any residual symptoms from stroke/TIA?  No   Did the patient receive a copy of their discharge instructions?  Yes   Nursing interventions  Reviewed instructions with patient   What is the patient's perception of their health status since discharge?  Improving   Is the patient able to teach back FAST for Stroke?  Yes   If the patient is a current smoker, are they able to teach back resources for cessation?  Not a smoker   Is the patient/caregiver able to teach back the hierarchy of who to call/visit for symptoms/problems? PCP, Specialist, Home health nurse, Urgent Care, ED, 911  Yes   Additional teach back comments  Pt reports she is almost out of  inhaler.  She was advised to call PCP for a refill prior to running out.   Week 2 call completed?  Yes   Wrap up additional comments  Pt states she is doing better, no residual symptoms. No questions/concerns at thist times.          Johanna Pelaez RN

## 2021-04-28 ENCOUNTER — READMISSION MANAGEMENT (OUTPATIENT)
Dept: CALL CENTER | Facility: HOSPITAL | Age: 31
End: 2021-04-28

## 2021-04-28 NOTE — OUTREACH NOTE
"Stroke Week 3 Survey      Responses   [facility] patient discharged from?  [place]   Does the patient have one of the following disease processes/diagnoses(primary or secondary)?  Stroke (TIA)   Week 3 attempt successful?  Yes   Call start time  1428   Call end time  1430   Discharge diagnosis  CVA)  COVID-19 infection   Is the patient taking all medications as directed (includes completed medication regime)?  Yes   Does the patient have a primary care provider?   Yes   Does the patient have an appointment with their PCP within 7 days of discharge?  Greater than 7 days   Has the patient kept scheduled appointments due by today?  N/A   Comments  Has appt witht PCP next week   Psychosocial issues?  No   Does the patient require any assistance with activities of daily living such as eating, bathing, dressing, walking, etc.?  No   Does the patient have any residual symptoms from stroke/TIA?  No   Does the patient understand the diet ordered at discharge?  Yes   What is the patient's perception of their health status since discharge?  Improving   Additional teach back comments  States she is doing \"great\".   Week 3 call completed?  Yes   Revoked  No further contact(revokes)-requires comment   Is the patient interested in additional calls from an ambulatory ?  NOTE:  applies to high risk patients requiring additional follow-up.  No   Graduated/Revoked comments  Denies questions or needs at this time          Brit Flowers LPN  "

## 2021-11-22 ENCOUNTER — INITIAL PRENATAL (OUTPATIENT)
Dept: OBSTETRICS AND GYNECOLOGY | Facility: CLINIC | Age: 31
End: 2021-11-22

## 2021-11-22 VITALS — WEIGHT: 244.8 LBS | DIASTOLIC BLOOD PRESSURE: 60 MMHG | BODY MASS INDEX: 38.34 KG/M2 | SYSTOLIC BLOOD PRESSURE: 118 MMHG

## 2021-11-22 DIAGNOSIS — Z34.91 INITIAL OBSTETRIC VISIT IN FIRST TRIMESTER: Primary | ICD-10-CM

## 2021-11-22 DIAGNOSIS — Z3A.01 6 WEEKS GESTATION OF PREGNANCY: ICD-10-CM

## 2021-11-22 DIAGNOSIS — O36.80X0 PREGNANCY WITH UNCERTAIN FETAL VIABILITY, SINGLE OR UNSPECIFIED FETUS: ICD-10-CM

## 2021-11-22 DIAGNOSIS — Z98.891 H/O: C-SECTION: ICD-10-CM

## 2021-11-22 LAB
B-HCG UR QL: POSITIVE
EXPIRATION DATE: ABNORMAL
GLUCOSE UR STRIP-MCNC: NEGATIVE MG/DL
INTERNAL NEGATIVE CONTROL: ABNORMAL
INTERNAL POSITIVE CONTROL: ABNORMAL
Lab: ABNORMAL
PROT UR STRIP-MCNC: NEGATIVE MG/DL

## 2021-11-22 PROCEDURE — 81025 URINE PREGNANCY TEST: CPT | Performed by: OBSTETRICS & GYNECOLOGY

## 2021-11-22 PROCEDURE — 99204 OFFICE O/P NEW MOD 45 MIN: CPT | Performed by: OBSTETRICS & GYNECOLOGY

## 2021-11-22 RX ORDER — SERTRALINE HYDROCHLORIDE 100 MG/1
200 TABLET, FILM COATED ORAL DAILY
COMMUNITY
Start: 2021-11-17

## 2021-11-22 RX ORDER — EPINEPHRINE 0.3 MG/.3ML
0.3 INJECTION SUBCUTANEOUS
COMMUNITY
Start: 2021-05-28

## 2021-11-22 RX ORDER — FAMOTIDINE 20 MG/1
20 TABLET, FILM COATED ORAL
COMMUNITY
Start: 2021-06-28

## 2021-11-22 NOTE — PROGRESS NOTES
Initial ob visit     CC- Here to initiate prenatal care.    Rosana Farnsworth is being seen today for her first obstetrical visit.  She is a 31 y.o. .  She has fairly regular menses and is sure of her last..  She has been seen by me in the past and I delivered her first child and took care of her through her first pregnancy.  Her pregnancies are notable for going to about 37 weeks and she had  with both and gestational diabetes with both.  She is having some nausea and requests some medicine for that.  She has had no bleeding.   6w2d gestation.   I saw this patient in my last office in 2019    # 1 - Date: None, Sex: None, Weight: None, GA: None, Delivery: None, Apgar1: None, Apgar5: None, Living: None, Birth Comments: None    # 2 - Date: None, Sex: None, Weight: None, GA: None, Delivery: None, Apgar1: None, Apgar5: None, Living: None, Birth Comments: None      Current obstetric complaints : Nausea      Prior obstetric issues, potential pregnancy concerns: Prior C-sections and gestational diabetes  Family history of genetic issues (includes FOB): None reported  Prior infections concerning in pregnancy (Rash, fever in last 2 weeks): None reported  Varicella Hx -vaccine  Prior testing for Cystic Fibrosis Carrier or Sickle Cell Trait-negative  Prepregnancy BMI - Body mass index is 38.34 kg/m².  Hx of HSV for patient or partner : None reported    Past Medical History:   Diagnosis Date   • Asthma    • Stroke (HCC)        Past Surgical History:   Procedure Laterality Date   •  SECTION     • CHOLECYSTECTOMY           Current Outpatient Medications:   •  albuterol (PROVENTIL HFA;VENTOLIN HFA) 108 (90 BASE) MCG/ACT inhaler, Inhale 2 puffs every 4 (four) hours as needed for wheezing., Disp: 1 inhaler, Rfl: 0  •  Calcium Carbonate-Vit D-Min (CALCIUM 1200 PO), Take  by mouth., Disp: , Rfl:   •  EPINEPHrine (EPIPEN) 0.3 MG/0.3ML solution auto-injector injection, Inject 0.3 mg into the appropriate  muscle as directed by prescriber., Disp: , Rfl:   •  famotidine (PEPCID) 20 MG tablet, Take 20 mg by mouth., Disp: , Rfl:   •  ferrous sulfate 325 (65 FE) MG tablet, Take 325 mg by mouth Daily With Breakfast., Disp: , Rfl:   •  L-Methylfolate-Algae 7.5-90.314 MG capsule, Take 1 tablet by mouth Daily., Disp: , Rfl:   •  Prenatal Vit-Fe Fumarate-FA (PRENATAL, CLASSIC, VITAMIN) 28-0.8 MG tablet tablet, Take  by mouth daily., Disp: , Rfl:   •  aspirin 325 MG tablet, Take 1 tablet by mouth Daily., Disp: 30 tablet, Rfl: 1  •  butalbital-acetaminophen-caffeine (FIORICET, ESGIC) -40 MG per tablet, Take 1 tablet by mouth Every 6 (Six) Hours As Needed for Headache., Disp: 10 tablet, Rfl: 0  •  Cholecalciferol 50 MCG (2000 UT) capsule, Take  by mouth Daily., Disp: , Rfl:   •  clonazePAM (KlonoPIN) 0.5 MG tablet, Take 0.5 mg by mouth 2 (Two) Times a Day As Needed for Seizures., Disp: , Rfl:   •  desvenlafaxine (PRISTIQ) 100 MG 24 hr tablet, Take 100 mg by mouth Daily., Disp: , Rfl:   •  metaxalone (SKELAXIN) 800 MG tablet, Take 1 tablet by mouth 3 (Three) Times a Day As Needed for Muscle Spasms., Disp: 15 tablet, Rfl: 0  •  sertraline (ZOLOFT) 100 MG tablet, , Disp: , Rfl:     Allergies   Allergen Reactions   • [place] Anaphylaxis and Shortness Of Breath   • Nuts Anaphylaxis   • Amoxicillin    • Morphine Other (See Comments)     headache   • Nsaids Unknown - Low Severity     No allergy; just cant have due to gastric sleeve   • Other      Tree nut    • Penicillins    • Sulfa Antibiotics Hives   • Insulin Lispro Hives and Itching       Social History     Socioeconomic History   • Marital status: Single   Tobacco Use   • Smoking status: Former Smoker   Substance and Sexual Activity   • Alcohol use: No       History reviewed. No pertinent family history.    Review of systems     Constitutional : Nausea, fatigue mild and present   : Vaginal bleeding, cramping negative  Breast Tenderness : Negative  All other systems reviewed  and are negative       Objective    /60   Wt 111 kg (244 lb 12.8 oz)   LMP 10/09/2021   BMI 38.34 kg/m²       General Appearance:    Alert, cooperative, in no acute distress, habitus mildly obese   Head:    Normocephalic, without obvious abnormality, atraumatic   Eyes:            Lids and lashes normal, conjunctivae and sclerae normal, no   icterus, no pallor, corneas clear   Ears:    Ears appear intact with no abnormalities noted       Neck:   no thyromegaly, no mass.   Back:                         Lungs:     Clear to auscultation,respirations regular, even and     unlabored    Heart:    Regular rhythm and normal rate, normal S1 and S2, no            murmur, no gallop, no rub, no click   Breast Exam:    No masses, No nipple discharge   Abdomen:     Normal bowel sounds, no masses, no organomegaly, soft        non-tender, non-distended, no guarding, no rebound                 tenderness   Genitalia:    Vulva - BUS-WNL, NEFG    Vagina - No discharge, No bleeding    Cervix - No Lesions, closed     Uterus - Consistent with 6 weeks    Adnexa - No mass, NT    Pelvimetry - clinically adequate, gynecoid pelvis     Extremities:   Moves all extremities well, no edema, no cyanosis, no              redness   Pulses:   Pulses palpable and equal bilaterally   Skin:   No bleeding, bruising or rash   Lymph nodes:   No palpable adenopathy   Neurologic:   Sensation intact, A&O times 3      Assessment & Plan     Diagnoses and all orders for this visit:    1. Initial obstetric visit in first trimester (Primary)  -     POC Pregnancy, Urine  -     NuSwab VG+ - Swab, Vagina  -     OB Panel With HIV  -     Urine Culture - Urine, Urine, Clean Catch  -     Drug Profile Urine - 9 Drugs - Urine, Clean Catch  -     Inheritest Core(CF97,SMA,FraX) - Blood,  -     US Ob Transvaginal  -     Hemoglobin A1c  -     HCG, B-subunit, Quantitative    2. 6 weeks gestation of pregnancy    3. H/O:     4. Pregnancy with uncertain fetal  viability, single or unspecified fetus  -     US Ob Transvaginal; Future        1) Pregnancy at 6w2d  2) uterus felt slightly enlarged today and an ultrasound shows a small gestational sac but no yolk sac or fetal pole yet.  We will add quantitative hCG and also get hemoglobin A1c.  Patient understands she will have a repeat  and she is also likely interested in tubal sterilization.  I discussed the nature of the group and call and that our group delivers at [facility].  We will repeat ultrasound in 2 weeks for dating purposes.         Activity recommendation : 150 minutes/week of moderate intensity aerobic activity unless we limit for bleeding, hypertension or other pregnancy complication   Patient is on Prenatal vitamins  Problem list reviewed and updated.  Reviewed routine prenatal care with the office to include but not limited to   Zika (travel restrictions/ok to use insect repellant), not to changing cat litter, food restrictions, avoidance of alcohol, tobacco and drugs and saunas/hot tubs.   All questions answered.     Carlos Esposito MD   2021  15:49 EST

## 2021-11-23 ENCOUNTER — TELEPHONE (OUTPATIENT)
Dept: OBSTETRICS AND GYNECOLOGY | Facility: CLINIC | Age: 31
End: 2021-11-23

## 2021-11-23 LAB
ABO GROUP BLD: ABNORMAL
BASOPHILS # BLD AUTO: 0.1 X10E3/UL (ref 0–0.2)
BASOPHILS NFR BLD AUTO: 1 %
BLD GP AB SCN SERPL QL: NEGATIVE
EOSINOPHIL # BLD AUTO: 0.1 X10E3/UL (ref 0–0.4)
EOSINOPHIL NFR BLD AUTO: 2 %
ERYTHROCYTE [DISTWIDTH] IN BLOOD BY AUTOMATED COUNT: 13.3 % (ref 11.7–15.4)
HBA1C MFR BLD: 5.1 % (ref 4.8–5.6)
HBV SURFACE AG SERPL QL IA: NEGATIVE
HCT VFR BLD AUTO: 39.1 % (ref 34–46.6)
HCV AB S/CO SERPL IA: <0.1 S/CO RATIO (ref 0–0.9)
HGB BLD-MCNC: 12.4 G/DL (ref 11.1–15.9)
HIV 1+2 AB+HIV1 P24 AG SERPL QL IA: NON REACTIVE
IMM GRANULOCYTES # BLD AUTO: 0 X10E3/UL (ref 0–0.1)
IMM GRANULOCYTES NFR BLD AUTO: 0 %
LYMPHOCYTES # BLD AUTO: 2.8 X10E3/UL (ref 0.7–3.1)
LYMPHOCYTES NFR BLD AUTO: 42 %
MCH RBC QN AUTO: 26.4 PG (ref 26.6–33)
MCHC RBC AUTO-ENTMCNC: 31.7 G/DL (ref 31.5–35.7)
MCV RBC AUTO: 83 FL (ref 79–97)
MONOCYTES # BLD AUTO: 0.4 X10E3/UL (ref 0.1–0.9)
MONOCYTES NFR BLD AUTO: 6 %
NEUTROPHILS # BLD AUTO: 3.3 X10E3/UL (ref 1.4–7)
NEUTROPHILS NFR BLD AUTO: 49 %
PLATELET # BLD AUTO: 306 X10E3/UL (ref 150–450)
RBC # BLD AUTO: 4.69 X10E6/UL (ref 3.77–5.28)
RH BLD: POSITIVE
RPR SER QL: NON REACTIVE
RUBV IGG SERPL IA-ACNC: 2.31 INDEX
WBC # BLD AUTO: 6.7 X10E3/UL (ref 3.4–10.8)

## 2021-11-23 NOTE — TELEPHONE ENCOUNTER
Returned call to patient, she stated she missed a call.   Updated patient, that I did not call but reviewed her ob panel labs since we were on the phone.

## 2021-11-24 LAB
A VAGINAE DNA VAG QL NAA+PROBE: ABNORMAL SCORE
BACTERIA UR CULT: NORMAL
BACTERIA UR CULT: NORMAL
BVAB2 DNA VAG QL NAA+PROBE: ABNORMAL SCORE
C ALBICANS DNA VAG QL NAA+PROBE: NEGATIVE
C GLABRATA DNA VAG QL NAA+PROBE: NEGATIVE
C TRACH DNA VAG QL NAA+PROBE: NEGATIVE
MEGA1 DNA VAG QL NAA+PROBE: ABNORMAL SCORE
N GONORRHOEA DNA VAG QL NAA+PROBE: NEGATIVE
T VAGINALIS DNA VAG QL NAA+PROBE: NEGATIVE

## 2021-11-24 RX ORDER — ONDANSETRON 4 MG/1
4 TABLET, ORALLY DISINTEGRATING ORAL EVERY 8 HOURS PRN
Qty: 30 TABLET | Refills: 3 | Status: SHIPPED | OUTPATIENT
Start: 2021-11-24 | End: 2022-07-01 | Stop reason: HOSPADM

## 2021-11-24 RX ORDER — METRONIDAZOLE 500 MG/1
500 TABLET ORAL 2 TIMES DAILY
Qty: 14 TABLET | Refills: 0 | Status: SHIPPED | OUTPATIENT
Start: 2021-11-24 | End: 2021-12-01

## 2021-11-30 LAB
AMPHETAMINES UR QL SCN: NEGATIVE NG/ML
BARBITURATES UR QL SCN: NEGATIVE NG/ML
BENZODIAZ UR QL: NEGATIVE NG/ML
BZE UR QL: NEGATIVE NG/ML
CANNABINOIDS UR CFM-MCNC: POSITIVE NG/ML
METHADONE UR QL SCN: NEGATIVE NG/ML
OPIATES UR QL: NEGATIVE NG/ML
PCP UR QL: NEGATIVE NG/ML
PROPOXYPH UR QL SCN: NEGATIVE NG/ML

## 2021-12-02 ENCOUNTER — PATIENT MESSAGE (OUTPATIENT)
Dept: OBSTETRICS AND GYNECOLOGY | Facility: CLINIC | Age: 31
End: 2021-12-02

## 2021-12-02 RX ORDER — ALBUTEROL SULFATE 90 UG/1
2 AEROSOL, METERED RESPIRATORY (INHALATION) EVERY 4 HOURS PRN
Qty: 18 G | Refills: 1 | Status: SHIPPED | OUTPATIENT
Start: 2021-12-02 | End: 2022-02-28

## 2021-12-02 RX ORDER — ALBUTEROL SULFATE 90 UG/1
2 AEROSOL, METERED RESPIRATORY (INHALATION) EVERY 4 HOURS PRN
Qty: 18 G | Refills: 1 | Status: SHIPPED | OUTPATIENT
Start: 2021-12-02 | End: 2021-12-02 | Stop reason: SDUPTHER

## 2021-12-02 NOTE — TELEPHONE ENCOUNTER
My Chart. 7w 5d. Initial prenatal visit 11/22/2021. Rosana has asthma and because she is pregnant, her PCP and the immediate care won't prescribe a steroid to help with her airway inflammation. Requesting steroid prescription be sent to her [facility] pharmacy on file.

## 2021-12-03 NOTE — TELEPHONE ENCOUNTER
I called and left a message for Rosana to call or send a message via my chart to let Dr Esposito know the medication and dosage of the rescue inhaler she wants.

## 2021-12-03 NOTE — TELEPHONE ENCOUNTER
My Chart. 7w 5d. Initial prenatal visit 11/22/2021. You sent in an albuterol inhaler for her and she is requesting a rescue inhaler. [facility] pharmacy on file. Patient scheduled for clearance, she will bring the paperwork from the surgeon to her appointment.

## 2021-12-06 DIAGNOSIS — J45.41 MODERATE PERSISTENT ASTHMA WITH EXACERBATION: Primary | ICD-10-CM

## 2021-12-06 RX ORDER — ALBUTEROL SULFATE 90 UG/1
2 AEROSOL, METERED RESPIRATORY (INHALATION) EVERY 6 HOURS PRN
Status: DISCONTINUED | OUTPATIENT
Start: 2021-12-06 | End: 2022-05-18

## 2021-12-06 NOTE — TELEPHONE ENCOUNTER
Med request for rescue inhaler. 8w 2d  Albuterol sulfate HFA 8.5 g net contents 90 mcg Albuterol per actuation. [facility] pharmacy on file.

## 2021-12-07 LAB
CLINICAL INFO: NORMAL
ETHNIC BACKGROUND STATED: NORMAL
GENE MUT TESTED BLD/T: NORMAL
GENETIC COUNSELOR:: NORMAL
LAB DIRECTOR NAME PROVIDER: NORMAL
MOL DX INTERP BLD/T QL: NORMAL
REASON FOR REFERRAL (NARRATIVE): NORMAL
REF LAB TEST METHOD: NORMAL
SERVICE CMNT 02-IMP: NORMAL
SPECIMEN SOURCE: NORMAL
TEST PERFORMANCE INFO SPEC: NORMAL

## 2021-12-20 ENCOUNTER — ROUTINE PRENATAL (OUTPATIENT)
Dept: OBSTETRICS AND GYNECOLOGY | Facility: CLINIC | Age: 31
End: 2021-12-20

## 2021-12-20 VITALS — BODY MASS INDEX: 38.53 KG/M2 | WEIGHT: 246 LBS | DIASTOLIC BLOOD PRESSURE: 78 MMHG | SYSTOLIC BLOOD PRESSURE: 120 MMHG

## 2021-12-20 DIAGNOSIS — Z98.891 H/O: C-SECTION: ICD-10-CM

## 2021-12-20 DIAGNOSIS — Z3A.10 10 WEEKS GESTATION OF PREGNANCY: Primary | ICD-10-CM

## 2021-12-20 DIAGNOSIS — Z34.81 PRENATAL CARE, SUBSEQUENT PREGNANCY IN FIRST TRIMESTER: ICD-10-CM

## 2021-12-20 LAB
GLUCOSE UR STRIP-MCNC: NEGATIVE MG/DL
PROT UR STRIP-MCNC: NEGATIVE MG/DL

## 2021-12-20 PROCEDURE — 99213 OFFICE O/P EST LOW 20 MIN: CPT | Performed by: OBSTETRICS & GYNECOLOGY

## 2021-12-20 NOTE — PROGRESS NOTES
OB follow up     Rosana Farnsworth is a 31 y.o.  9w3d being seen today for her obstetrical visit.  Patient reports no complaints. Fetal movement: Too early.    Her prenatal care is complicated by (and status): Prior  x2    Review of Systems  Cramping/contractions : None  Vaginal bleeding: Negative  Fetal movement too early    /78   Wt 112 kg (246 lb)   LMP 10/09/2021   BMI 38.53 kg/m²     FHT:  Too early BPM   Uterine Size: size equals dates       Assessment    Diagnoses and all orders for this visit:    1. 10 weeks gestation of pregnancy (Primary)  -     [id number] PLUS Core - Blood,    2. Prenatal care, subsequent pregnancy in first trimester    3. H/O:         1) pregnancy at 9w3d         Plan    Reviewed this stage of pregnancy  Problem list updated   Follow up in 1 weeks.  We will do noninvasive prenatal testing at that time.  She is considering tubal sterilization we discussed this.  I spent 20 minutes caring for Rosana on this date of service. This time includes time spent by me in the following activities: preparing for the visit, reviewing tests, obtaining and/or reviewing a separately obtained history, counseling and educating the patient/family/caregiver, ordering medications, tests, or procedures and documenting information in the medical record      Carlos Esposito MD   2021  14:19 EST

## 2021-12-29 ENCOUNTER — ROUTINE PRENATAL (OUTPATIENT)
Dept: OBSTETRICS AND GYNECOLOGY | Facility: CLINIC | Age: 31
End: 2021-12-29

## 2021-12-29 VITALS — WEIGHT: 247 LBS | BODY MASS INDEX: 38.69 KG/M2 | DIASTOLIC BLOOD PRESSURE: 62 MMHG | SYSTOLIC BLOOD PRESSURE: 118 MMHG

## 2021-12-29 DIAGNOSIS — Z34.81 PRENATAL CARE, SUBSEQUENT PREGNANCY IN FIRST TRIMESTER: Primary | ICD-10-CM

## 2021-12-29 DIAGNOSIS — Z3A.10 10 WEEKS GESTATION OF PREGNANCY: ICD-10-CM

## 2021-12-29 DIAGNOSIS — Z98.891 H/O: C-SECTION: ICD-10-CM

## 2021-12-29 PROCEDURE — 99213 OFFICE O/P EST LOW 20 MIN: CPT | Performed by: OBSTETRICS & GYNECOLOGY

## 2021-12-29 NOTE — PROGRESS NOTES
OB follow up     Rosana Farnsworth is a 31 y.o.  10w5d being seen today for her obstetrical visit.  Patient reports no complaints. Fetal movement: Too early.    Her prenatal care is complicated by (and status): Prior  x2 and prior gestational diabetes    Review of Systems  Cramping/contractions : Negative  Vaginal bleeding: None  Fetal movement too early    /62   Wt 112 kg (247 lb)   LMP 10/09/2021   BMI 38.69 kg/m²     FHT:  150s BPM   Uterine Size: size equals dates       Assessment    Diagnoses and all orders for this visit:    1. Prenatal care, subsequent pregnancy in first trimester (Primary)    2. 10 weeks gestation of pregnancy    3. H/O:         1) pregnancy at 10w5d         Plan    Reviewed this stage of pregnancy  Problem list updated   Follow up in 4 weeks.  NIPT today.  We did do a transabdominal ultrasound due to inability to auscultate heart tones.  I spent 20 minutes caring for Rosana on this date of service. This time includes time spent by me in the following activities: preparing for the visit, reviewing tests, obtaining and/or reviewing a separately obtained history, performing a medically appropriate examination and/or evaluation, counseling and educating the patient/family/caregiver, ordering medications, tests, or procedures and documenting information in the medical record      Carlos Esposito MD   2021  16:59 EST

## 2022-01-02 LAB
CFDNA.FET/CFDNA.TOTAL SFR FETUS: NORMAL %
CITATION REF LAB TEST: NORMAL
FET 13+18+21+X+Y ANEUP PLAS.CFDNA: NEGATIVE
FET CHR 21 TS PLAS.CFDNA QL: NEGATIVE
FET SEX PLAS.CFDNA DOSAGE CFDNA: NORMAL
FET TS 13 RISK PLAS.CFDNA QL: NEGATIVE
FET TS 18 RISK WBC.DNA+CFDNA QL: NEGATIVE
GA EST FROM CONCEPTION DATE: NORMAL D
GESTATIONAL AGE > 9:: YES
LAB DIRECTOR NAME PROVIDER: NORMAL
LAB DIRECTOR NAME PROVIDER: NORMAL
LABORATORY COMMENT REPORT: NORMAL
LIMITATIONS OF THE TEST: NORMAL
NEGATIVE PREDICTIVE VALUE: NORMAL
NOTE: NORMAL
PERFORMANCE CHARACTERISTICS: NORMAL
POSITIVE PREDICTIVE VALUE: NORMAL
REF LAB TEST METHOD: NORMAL
TEST PERFORMANCE INFO SPEC: NORMAL

## 2022-01-10 ENCOUNTER — TELEPHONE (OUTPATIENT)
Dept: OBSTETRICS AND GYNECOLOGY | Facility: CLINIC | Age: 32
End: 2022-01-10

## 2022-01-17 ENCOUNTER — TELEPHONE (OUTPATIENT)
Dept: OBSTETRICS AND GYNECOLOGY | Facility: CLINIC | Age: 32
End: 2022-01-17

## 2022-01-17 RX ORDER — BLOOD-GLUCOSE METER
1 KIT MISCELLANEOUS 4 TIMES DAILY
Qty: 1 EACH | Refills: 0 | Status: SHIPPED | OUTPATIENT
Start: 2022-01-17

## 2022-01-17 RX ORDER — GLUCOSAMINE HCL/CHONDROITIN SU 500-400 MG
1 CAPSULE ORAL 4 TIMES DAILY
Qty: 120 EACH | Refills: 3 | Status: SHIPPED | OUTPATIENT
Start: 2022-01-17 | End: 2022-07-01 | Stop reason: HOSPADM

## 2022-01-18 RX ORDER — LANCETS 28 GAUGE
EACH MISCELLANEOUS
Qty: 100 EACH | Refills: 12 | Status: SHIPPED | OUTPATIENT
Start: 2022-01-18

## 2022-01-26 ENCOUNTER — ROUTINE PRENATAL (OUTPATIENT)
Dept: OBSTETRICS AND GYNECOLOGY | Facility: CLINIC | Age: 32
End: 2022-01-26

## 2022-01-26 VITALS — SYSTOLIC BLOOD PRESSURE: 120 MMHG | WEIGHT: 265.2 LBS | BODY MASS INDEX: 41.54 KG/M2 | DIASTOLIC BLOOD PRESSURE: 74 MMHG

## 2022-01-26 DIAGNOSIS — Z3A.14 14 WEEKS GESTATION OF PREGNANCY: ICD-10-CM

## 2022-01-26 DIAGNOSIS — O99.210 OBESITY IN PREGNANCY, ANTEPARTUM: ICD-10-CM

## 2022-01-26 DIAGNOSIS — Z98.891 H/O: C-SECTION: ICD-10-CM

## 2022-01-26 DIAGNOSIS — O24.410 DIET CONTROLLED GESTATIONAL DIABETES MELLITUS (GDM) IN SECOND TRIMESTER: Primary | ICD-10-CM

## 2022-01-26 LAB
GLUCOSE UR STRIP-MCNC: NEGATIVE MG/DL
PROT UR STRIP-MCNC: NEGATIVE MG/DL

## 2022-01-26 PROCEDURE — 99213 OFFICE O/P EST LOW 20 MIN: CPT | Performed by: OBSTETRICS & GYNECOLOGY

## 2022-01-26 NOTE — PROGRESS NOTES
OB follow up     Rosana Farnsworth is a 31 y.o.  14w5d being seen today for her obstetrical visit.  Patient reports no complaints. Fetal movement: Very early.  Is been doing her glucose checks at home.  She came in for an early glucose challenge test a couple weeks ago and it came back at 39.  She was symptomatic and did eat some glucose that helped her feel better.  We discussed methods to limit the possibility of hypoglycemic episodes.  I also instructed her to do a week of checking her fasting levels along with 2-hour postprandial levels and to notify us with the results.  She discusses desire for permanent sterilization at the close of this pregnancy.  We will signed consent at 24 to 28 weeks.    Her prenatal care is complicated by (and status): Obesity in pregnancy, prior  section x2, diet-controlled gestational diabetes.    Review of Systems  Cramping/contractions : Negative  Vaginal bleeding: None reported  Fetal movement very early    /74   Wt 120 kg (265 lb 3.2 oz)   LMP 10/09/2021   BMI 41.54 kg/m²     FHT:  130s BPM   Uterine Size: size equals dates       Assessment    Diagnoses and all orders for this visit:    1. Diet controlled gestational diabetes mellitus (GDM) in second trimester (Primary)    2. H/O:     3. 14 weeks gestation of pregnancy    4. Obesity in pregnancy, antepartum        1) pregnancy at 14w5d         Plan    Reviewed this stage of pregnancy  Problem list updated   Follow up in 4 weeks.  See HPI for instructions.  We will plan anatomy ultrasound for 20 to 22 weeks.  I spent 20 minutes caring for Rosana on this date of service. This time includes time spent by me in the following activities: preparing for the visit, reviewing tests, performing a medically appropriate examination and/or evaluation, counseling and educating the patient/family/caregiver and documenting information in the medical record      Carlos Esposito MD   2022  14:38 EST

## 2022-02-23 ENCOUNTER — ROUTINE PRENATAL (OUTPATIENT)
Dept: OBSTETRICS AND GYNECOLOGY | Facility: CLINIC | Age: 32
End: 2022-02-23

## 2022-02-23 VITALS — DIASTOLIC BLOOD PRESSURE: 70 MMHG | SYSTOLIC BLOOD PRESSURE: 124 MMHG | BODY MASS INDEX: 41 KG/M2 | WEIGHT: 261.8 LBS

## 2022-02-23 DIAGNOSIS — Z98.891 HISTORY OF C-SECTION: ICD-10-CM

## 2022-02-23 DIAGNOSIS — O99.210 OBESITY IN PREGNANCY, ANTEPARTUM: ICD-10-CM

## 2022-02-23 DIAGNOSIS — Z34.82 PRENATAL CARE, SUBSEQUENT PREGNANCY IN SECOND TRIMESTER: Primary | ICD-10-CM

## 2022-02-23 DIAGNOSIS — Z3A.18 18 WEEKS GESTATION OF PREGNANCY: ICD-10-CM

## 2022-02-23 LAB
GLUCOSE UR STRIP-MCNC: NEGATIVE MG/DL
PROT UR STRIP-MCNC: NEGATIVE MG/DL

## 2022-02-23 PROCEDURE — 99213 OFFICE O/P EST LOW 20 MIN: CPT | Performed by: OBSTETRICS & GYNECOLOGY

## 2022-02-23 RX ORDER — UREA 10 %
1 LOTION (ML) TOPICAL DAILY
COMMUNITY

## 2022-02-23 NOTE — PROGRESS NOTES
OB follow up     Rosana Farnsworth is a 31 y.o.  18w5d being seen today for her obstetrical visit.  Patient reports no complaints. Fetal movement: normal.  States that she will have fasting levels that are sometimes over 105-110 range and will have 2-hour postprandial levels that will be as high as 160.  She is doing well on her diet overall avoiding simple sugars and has lost 1/2 kg since her last visit.    Her prenatal care is complicated by (and status): Prior  and gestational diabetes    Review of Systems  Cramping/contractions : None reported  Vaginal bleeding: Negative  Fetal movement normal for 18 weeks    /70   Wt 119 kg (261 lb 12.8 oz)   LMP 10/09/2021   BMI 41.00 kg/m²     FHT:  140s BPM   Uterine Size: size equals dates       Assessment    Diagnoses and all orders for this visit:    1. Prenatal care, subsequent pregnancy in second trimester (Primary)    2. 18 weeks gestation of pregnancy  -     US Ob 14 + Weeks Single or First Gestation; Future    3. History of     4. Obesity in pregnancy, antepartum  -     US Ob 14 + Weeks Single or First Gestation; Future        1) pregnancy at 18w5d         Plan    Reviewed this stage of pregnancy  Problem list updated   Follow up in 3 weeks.  Will line up her anatomy ultrasound.  We will also work on setting up maternal-fetal medicine appointment for assistance with diabetic management.  We will put a consult in for that.  I spent 20 minutes caring for Rosana on this date of service. This time includes time spent by me in the following activities: preparing for the visit, reviewing tests, obtaining and/or reviewing a separately obtained history, performing a medically appropriate examination and/or evaluation, counseling and educating the patient/family/caregiver, ordering medications, tests, or procedures and referring and communicating with other health care professionals      Carlos Esposito MD   2022  14:52 EST

## 2022-03-07 ENCOUNTER — APPOINTMENT (OUTPATIENT)
Dept: ULTRASOUND IMAGING | Facility: HOSPITAL | Age: 32
End: 2022-03-07

## 2022-03-07 ENCOUNTER — HOSPITAL ENCOUNTER (EMERGENCY)
Facility: HOSPITAL | Age: 32
Discharge: HOME | End: 2022-03-07
Attending: EMERGENCY MEDICINE | Admitting: EMERGENCY MEDICINE

## 2022-03-07 VITALS
BODY MASS INDEX: 41.59 KG/M2 | WEIGHT: 265 LBS | DIASTOLIC BLOOD PRESSURE: 51 MMHG | HEART RATE: 84 BPM | HEIGHT: 67 IN | TEMPERATURE: 98 F | SYSTOLIC BLOOD PRESSURE: 109 MMHG | OXYGEN SATURATION: 96 % | RESPIRATION RATE: 16 BRPM

## 2022-03-07 DIAGNOSIS — Z3A.20 20 WEEKS GESTATION OF PREGNANCY: ICD-10-CM

## 2022-03-07 DIAGNOSIS — R11.2 NAUSEA AND VOMITING, INTRACTABILITY OF VOMITING NOT SPECIFIED, UNSPECIFIED VOMITING TYPE: Primary | ICD-10-CM

## 2022-03-07 DIAGNOSIS — N30.90 CYSTITIS: ICD-10-CM

## 2022-03-07 LAB
ALBUMIN SERPL-MCNC: 3.3 G/DL (ref 3.5–5.2)
ALBUMIN/GLOB SERPL: 1.3 G/DL
ALP SERPL-CCNC: 57 U/L (ref 39–117)
ALT SERPL W P-5'-P-CCNC: 9 U/L (ref 1–33)
ANION GAP SERPL CALCULATED.3IONS-SCNC: 9 MMOL/L (ref 5–15)
AST SERPL-CCNC: 9 U/L (ref 1–32)
BACTERIA UR QL AUTO: ABNORMAL /HPF
BASOPHILS # BLD AUTO: 0.01 10*3/MM3 (ref 0–0.2)
BASOPHILS NFR BLD AUTO: 0.2 % (ref 0–1.5)
BILIRUB SERPL-MCNC: 0.3 MG/DL (ref 0–1.2)
BILIRUB UR QL STRIP: NEGATIVE
BUN SERPL-MCNC: 7 MG/DL (ref 6–20)
BUN/CREAT SERPL: 20.6 (ref 7–25)
CALCIUM SPEC-SCNC: 8 MG/DL (ref 8.6–10.5)
CHLORIDE SERPL-SCNC: 108 MMOL/L (ref 98–107)
CLARITY UR: CLEAR
CO2 SERPL-SCNC: 20 MMOL/L (ref 22–29)
COLOR UR: YELLOW
CREAT SERPL-MCNC: 0.34 MG/DL (ref 0.57–1)
DEPRECATED RDW RBC AUTO: 36.5 FL (ref 37–54)
EGFRCR SERPLBLD CKD-EPI 2021: 143.1 ML/MIN/1.73
EOSINOPHIL # BLD AUTO: 0.01 10*3/MM3 (ref 0–0.4)
EOSINOPHIL NFR BLD AUTO: 0.2 % (ref 0.3–6.2)
ERYTHROCYTE [DISTWIDTH] IN BLOOD BY AUTOMATED COUNT: 13 % (ref 12.3–15.4)
GLOBULIN UR ELPH-MCNC: 2.5 GM/DL
GLUCOSE BLDC GLUCOMTR-MCNC: 98 MG/DL (ref 70–130)
GLUCOSE SERPL-MCNC: 97 MG/DL (ref 65–99)
GLUCOSE UR STRIP-MCNC: NEGATIVE MG/DL
HCG INTACT+B SERPL-ACNC: NORMAL MIU/ML
HCT VFR BLD AUTO: 33.8 % (ref 34–46.6)
HGB BLD-MCNC: 11.7 G/DL (ref 12–15.9)
HGB UR QL STRIP.AUTO: NEGATIVE
HYALINE CASTS UR QL AUTO: ABNORMAL /LPF
IMM GRANULOCYTES # BLD AUTO: 0.02 10*3/MM3 (ref 0–0.05)
IMM GRANULOCYTES NFR BLD AUTO: 0.3 % (ref 0–0.5)
KETONES UR QL STRIP: ABNORMAL
LEUKOCYTE ESTERASE UR QL STRIP.AUTO: ABNORMAL
LYMPHOCYTES # BLD AUTO: 0.35 10*3/MM3 (ref 0.7–3.1)
LYMPHOCYTES NFR BLD AUTO: 5.5 % (ref 19.6–45.3)
MCH RBC QN AUTO: 27.2 PG (ref 26.6–33)
MCHC RBC AUTO-ENTMCNC: 34.6 G/DL (ref 31.5–35.7)
MCV RBC AUTO: 78.6 FL (ref 79–97)
MONOCYTES # BLD AUTO: 0.13 10*3/MM3 (ref 0.1–0.9)
MONOCYTES NFR BLD AUTO: 2.1 % (ref 5–12)
NEUTROPHILS NFR BLD AUTO: 5.82 10*3/MM3 (ref 1.7–7)
NEUTROPHILS NFR BLD AUTO: 91.7 % (ref 42.7–76)
NITRITE UR QL STRIP: NEGATIVE
NRBC BLD AUTO-RTO: 0 /100 WBC (ref 0–0.2)
PH UR STRIP.AUTO: 7.5 [PH] (ref 5–8)
PLATELET # BLD AUTO: 224 10*3/MM3 (ref 140–450)
PMV BLD AUTO: 8.8 FL (ref 6–12)
POTASSIUM SERPL-SCNC: 3.5 MMOL/L (ref 3.5–5.2)
PROT SERPL-MCNC: 5.8 G/DL (ref 6–8.5)
PROT UR QL STRIP: ABNORMAL
RBC # BLD AUTO: 4.3 10*6/MM3 (ref 3.77–5.28)
RBC # UR STRIP: ABNORMAL /HPF
REF LAB TEST METHOD: ABNORMAL
SODIUM SERPL-SCNC: 137 MMOL/L (ref 136–145)
SP GR UR STRIP: 1.03 (ref 1–1.03)
SQUAMOUS #/AREA URNS HPF: ABNORMAL /HPF
UROBILINOGEN UR QL STRIP: ABNORMAL
WBC # UR STRIP: ABNORMAL /HPF
WBC NRBC COR # BLD: 6.34 10*3/MM3 (ref 3.4–10.8)

## 2022-03-07 PROCEDURE — 96375 TX/PRO/DX INJ NEW DRUG ADDON: CPT

## 2022-03-07 PROCEDURE — 81001 URINALYSIS AUTO W/SCOPE: CPT | Performed by: EMERGENCY MEDICINE

## 2022-03-07 PROCEDURE — 99283 EMERGENCY DEPT VISIT LOW MDM: CPT

## 2022-03-07 PROCEDURE — 82962 GLUCOSE BLOOD TEST: CPT

## 2022-03-07 PROCEDURE — 96374 THER/PROPH/DIAG INJ IV PUSH: CPT

## 2022-03-07 PROCEDURE — 87086 URINE CULTURE/COLONY COUNT: CPT | Performed by: EMERGENCY MEDICINE

## 2022-03-07 PROCEDURE — 76811 OB US DETAILED SNGL FETUS: CPT

## 2022-03-07 PROCEDURE — 80053 COMPREHEN METABOLIC PANEL: CPT | Performed by: EMERGENCY MEDICINE

## 2022-03-07 PROCEDURE — 84702 CHORIONIC GONADOTROPIN TEST: CPT | Performed by: EMERGENCY MEDICINE

## 2022-03-07 PROCEDURE — 76811 OB US DETAILED SNGL FETUS: CPT | Performed by: OBSTETRICS & GYNECOLOGY

## 2022-03-07 PROCEDURE — 25010000002 ONDANSETRON PER 1 MG: Performed by: EMERGENCY MEDICINE

## 2022-03-07 PROCEDURE — 36415 COLL VENOUS BLD VENIPUNCTURE: CPT

## 2022-03-07 PROCEDURE — 25010000002 METOCLOPRAMIDE PER 10 MG: Performed by: EMERGENCY MEDICINE

## 2022-03-07 PROCEDURE — 85025 COMPLETE CBC W/AUTO DIFF WBC: CPT | Performed by: EMERGENCY MEDICINE

## 2022-03-07 RX ORDER — ONDANSETRON 4 MG/1
4 TABLET, ORALLY DISINTEGRATING ORAL EVERY 8 HOURS PRN
Qty: 15 TABLET | Refills: 0 | Status: SHIPPED | OUTPATIENT
Start: 2022-03-07 | End: 2022-03-12

## 2022-03-07 RX ORDER — METOCLOPRAMIDE HYDROCHLORIDE 5 MG/ML
10 INJECTION INTRAMUSCULAR; INTRAVENOUS ONCE
Status: COMPLETED | OUTPATIENT
Start: 2022-03-07 | End: 2022-03-07

## 2022-03-07 RX ORDER — ONDANSETRON 2 MG/ML
4 INJECTION INTRAMUSCULAR; INTRAVENOUS ONCE
Status: COMPLETED | OUTPATIENT
Start: 2022-03-07 | End: 2022-03-07

## 2022-03-07 RX ORDER — SODIUM CHLORIDE 0.9 % (FLUSH) 0.9 %
10 SYRINGE (ML) INJECTION AS NEEDED
Status: DISCONTINUED | OUTPATIENT
Start: 2022-03-07 | End: 2022-03-07 | Stop reason: HOSPADM

## 2022-03-07 RX ORDER — NITROFURANTOIN 25; 75 MG/1; MG/1
100 CAPSULE ORAL 2 TIMES DAILY
Qty: 10 CAPSULE | Refills: 0 | Status: SHIPPED | OUTPATIENT
Start: 2022-03-07 | End: 2022-03-12

## 2022-03-07 RX ADMIN — ONDANSETRON 4 MG: 2 INJECTION INTRAMUSCULAR; INTRAVENOUS at 13:53

## 2022-03-07 RX ADMIN — SODIUM CHLORIDE, POTASSIUM CHLORIDE, SODIUM LACTATE AND CALCIUM CHLORIDE 1000 ML: 600; 310; 30; 20 INJECTION, SOLUTION INTRAVENOUS at 13:53

## 2022-03-07 RX ADMIN — METOCLOPRAMIDE HYDROCHLORIDE 10 MG: 5 INJECTION INTRAMUSCULAR; INTRAVENOUS at 15:33

## 2022-03-07 NOTE — EXTERNAL PATIENT INSTRUCTIONS
Patient Education   Table of Contents       Nausea and Vomiting, Adult     To view videos and all your education online visit,   [email or web address]   or scan this QR code with your smartphone.                  Nausea and Vomiting, Adult     Nausea is the feeling that you have an upset stomach or that you are about to vomit. Vomiting is when stomach contents are thrown up and out of the mouth as a result of nausea. Vomiting can make you feel weak and cause you to become dehydrated.   Dehydration can make you feel tired and thirsty, cause you to have a dry mouth, and decrease how often you urinate. Older adults and people with other diseases or a weak disease-fighting system (immune system) are at higher risk for dehydration. It is important to treat your nausea and vomiting as told by your health care provider.   Follow these instructions at home:   Watch your symptoms for any changes. Tell your health care provider about them. Follow these instructions to care for yourself at home.   Eating and drinking               Take an oral rehydration solution (ORS). This is a drink that is sold at pharmacies and retail stores.       Drink clear fluids slowly and in small amounts as you are able. Clear fluids include water, ice chips, low-calorie sports drinks, and fruit juice that has water added (diluted fruit juice).       Eat bland, easy-to-digest foods in small amounts as you are able. These foods include bananas, applesauce, rice, lean meats, toast, and crackers.       Avoid fluids that contain a lot of sugar or caffeine, such as energy drinks, sports drinks, and soda.       Avoid alcohol.       Avoid spicy or fatty foods.       General instructions         Take over-the-counter and prescription medicines only as told by your health care provider.       Drink enough fluid to keep your urine pale yellow.       Wash your hands often using soap and water. If soap and water are not available, use hand  .       Make sure that all people in your household wash their hands well and often.       Rest at home while you recover.       Watch your condition for any changes.       Breathe slowly and deeply when you feel nauseated.       Keep all follow-up visits as told by your health care provider. This is important.       Contact a health care provider if:         Your symptoms get worse.       You have new symptoms.       You have a fever.       You cannot drink fluids without vomiting.       Your nausea does not go away after 2 days.       You feel light-headed or dizzy.       You have a headache.       You have muscle cramps.       You have a rash.       You have pain while urinating.     Get help right away if:         You have pain in your chest, neck, arm, or jaw.       You feel extremely weak or you faint.       You have persistent vomiting.       You have vomit that is bright red or looks like black coffee grounds.       You have bloody or black stools or stools that look like tar.       You have a severe headache, a stiff neck, or both.       You have severe pain, cramping, or bloating in your abdomen.       You have difficulty breathing, or you are breathing very quickly.       Your heart is beating very quickly.       Your skin feels cold and clammy.       You feel confused.      You have signs of dehydration, such as:       Dark urine, very little urine, or no urine.       Cracked lips.       Dry mouth.       Sunken eyes.       Sleepiness.       Weakness.     These symptoms may represent a serious problem that is an emergency. Do not wait to see if the symptoms will go away. Get medical help right away. Call your local emergency services (911 in the [place] Do not drive yourself to the hospital.   Summary         Nausea is the feeling that you have an upset stomach or that you are about to vomit. As nausea gets worse, it can lead to vomiting. Vomiting can make you feel weak and cause you to become  dehydrated.       Follow instructions from your health care provider about eating and drinking to prevent dehydration.       Take over-the-counter and prescription medicines only as told by your health care provider.       Contact your health care provider if your symptoms get worse, or you have new symptoms.       Keep all follow-up visits as told by your health care provider. This is important.     This information is not intended to replace advice given to you by your health care provider. Make sure you discuss any questions you have with your health care provider.     Document Released: 12/18/2006Document Revised: 04/10/2020Document Reviewed: 05/28/2019     [employer] Patient Education ? 2021 [employer]

## 2022-03-07 NOTE — ED TRIAGE NOTES
Patient to ER via car from home for vomiting and abdominal cramping that started last night. Patient states she is 20 weeks pregnant with g. Diabetes. Patient of Dr. Fraire. Patient reports that she has been unable to keep anything down. This RN called  L&D ED  and she states that she wanted the patient to be seen down in the ER. Patient blood g at triage 98. This rn in PPE Patient wearing mask

## 2022-03-07 NOTE — EXTERNAL PATIENT INSTRUCTIONS
Patient Education   Table of Contents       Urinary Tract Infection, Adult       Vomiting, Adult     To view videos and all your education online visit,   [email or web address]   or scan this QR code with your smartphone.                  Urinary Tract Infection, Adult        A urinary tract infection (UTI) is an infection of any part of the urinary tract. The urinary tract includes the kidneys, ureters, bladder, and urethra. These organs make, store, and get rid of urine in the body.   An upper UTI affects the ureters and kidneys. A lower UTI affects the bladder and urethra.     What are the causes?   Most urinary tract infections are caused by bacteria in your genital area around your urethra, where urine leaves your body. These bacteria grow and cause inflammation of your urinary tract.   What increases the risk?    You are more likely to develop this condition if:       You have a urinary catheter that stays in place.       You are not able to control when you urinate or have a bowel movement (incontinence).      You are female and you:       Use a spermicide or diaphragm for birth control.       Have low estrogen levels.       Are pregnant.       You have certain genes that increase your risk.       You are sexually active.       You take antibiotic medicines.      You have a condition that causes your flow of urine to slow down, such as:       An enlarged prostate, if you are male.       Blockage in your urethra.       A kidney stone.       A nerve condition that affects your bladder control (neurogenic bladder).       Not getting enough to drink, or not urinating often.      You have certain medical conditions, such as:       Diabetes.       A weak disease-fighting system (immunesystem).       Sickle cell disease.       Gout.       Spinal cord injury.     What are the signs or symptoms?    Symptoms of this condition include:       Needing to urinate right away (urgency).       Frequent urination.  This may include small amounts of urine each time you urinate.       Pain or burning with urination.       Blood in the urine.       Urine that smells bad or unusual.       Trouble urinating.       Cloudy urine.       Vaginal discharge, if you are female.       Pain in the abdomen or the lower back.      You may also have:       Vomiting or a decreased appetite.       Confusion.       Irritability or tiredness.       A fever or chills.       Diarrhea.     The first symptom in older adults may be confusion. In some cases, they may not have any symptoms until the infection has worsened.   How is this diagnosed?    This condition is diagnosed based on your medical history and a physical exam. You may also have other tests, including:       Urine tests.       Blood tests.       Tests for STIs (sexually transmitted infections).     If you have had more than one UTI, a cystoscopy or imaging studies may be done to determine the cause of the infections.   How is this treated?    Treatment for this condition includes:       Antibiotic medicine.       Over-the-counter medicines to treat discomfort.       Drinking enough water to stay hydrated.     If you have frequent infections or have other conditions such as a kidney stone, you may need to see a health care provider who specializes in the urinary tract (urologist).   In rare cases, urinary tract infections can cause sepsis. Sepsis is a life-threatening condition that occurs when the body responds to an infection. Sepsis is treated in the hospital with IV antibiotics, fluids, and other medicines.   Follow these instructions at home:      Medicines         Take over-the-counter and prescription medicines only as told by your health care provider.       If you were prescribed an antibiotic medicine, take it as told by your health care provider. Do not  stop using the antibiotic even if you start to feel better.     General instructions        Make sure you:       Empty your  bladder often and completely. Do not  hold urine for long periods of time.       Empty your bladder after sex.       Wipe from front to back after urinating or having a bowel movement if you are female. Use each tissue only one time when you wipe.       Drink enough fluid to keep your urine pale yellow.       Keep all follow-up visits. This is important.       Contact a health care provider if:         Your symptoms do not get better after 1?2 days.       Your symptoms go away and then return.     Get help right away if:         You have severe pain in your back or your lower abdomen.       You have a fever or chills.       You have nausea or vomiting.     Summary         A urinary tract infection (UTI) is an infection of any part of the urinary tract, which includes the kidneys, ureters, bladder, and urethra.       Most urinary tract infections are caused by bacteria in your genital area.       Treatment for this condition often includes antibiotic medicines.       If you were prescribed an antibiotic medicine, take it as told by your health care provider. Do not  stop using the antibiotic even if you start to feel better.       Keep all follow-up visits. This is important.     This information is not intended to replace advice given to you by your health care provider. Make sure you discuss any questions you have with your health care provider.     Document Released: 09/27/2006Document Revised: 07/30/2021Document Reviewed: 07/30/2021     [employer] Patient Education ? 2021 [employer]         Vomiting, Adult     Vomiting occurs when stomach contents are thrown up and out of the mouth. Many people notice nausea before vomiting. Vomiting can make you feel weak and cause you to become dehydrated. Dehydration can make you feel tired and thirsty, cause you to have a dry mouth, and decrease how often you urinate. Older adults and people who have other diseases or a weak body defense system (immune system) are at higher  risk for dehydration. It is important to treat vomiting as told by your health care provider.   Follow these instructions at home:      Eating and drinking            Follow these recommendations as told by your health care provider:       Take an oral rehydration solution (ORS). This is a drink that is sold at pharmacies and retail stores.       Eat bland, easy-to-digest foods in small amounts as you are able. These foods include bananas, applesauce, rice, lean meats, toast, and crackers.       Drink clear fluids slowly and in small amounts as you are able. Clear fluids include water, ice chips, low-calorie sports drinks, and fruit juice that has water added (diluted fruit juice).       Avoid drinking fluids that contain a lot of sugar or caffeine, such as energy drinks, sports drinks, and soda.       Avoid alcohol.       Avoid spicy or fatty foods.       General instructions         Wash your hands often using soap and water. If soap and water are not available, use hand . Make sure that everyone in your household washes their hands frequently.       Take over-the-counter and prescription medicines only as told by your health care provider.       Rest at home while you recover.       Watch your condition for any changes.       Keep all follow-up visits as told by your health care provider. This is important.       Contact a health care provider if:         Your vomiting gets worse.       You have new symptoms.       You have a fever.       You cannot drink fluids without vomiting.       You feel light-headed or dizzy.       You have a headache.       You have muscle cramps.       You have a rash.       You have pain while urinating.     Get help right away if:         You have pain in your chest, neck, arm, or jaw.       You feel extremely weak or you faint.       You have persistent vomiting.       You have vomit that is bright red or looks like black coffee grounds.       You have stools that are bloody  or black, or stools that look like tar.       You have a severe headache, a stiff neck, or both.       You have severe pain, cramping, or bloating in your abdomen.       You have trouble breathing or you are breathing very quickly.       Your heart is beating very quickly.       Your skin feels cold and clammy.       You feel confused.      You have signs of dehydration, such as:       Dark urine, very little urine, or no urine.       Cracked lips.       Dry mouth.       Sunken eyes.       Sleepiness.       Weakness.     These symptoms may represent a serious problem that is an emergency. Do not wait to see if the symptoms will go away. Get medical help right away. Call your local emergency services (911 in the [place] Do not drive yourself to the hospital.   Summary         Vomiting occurs when stomach contents are thrown up and out of the mouth. Vomiting can cause you to become dehydrated. Older adults and people who have other diseases or a weak immune system are at higher risk for dehydration.       It is important to treat vomiting as told by your health care provider. Follow your health care provider's instructions about eating and drinking.       Wash your hands often using soap and water. If soap and water are not available, use hand . Make sure that everyone in your household washes their hands frequently.       Watch your condition for any changes and for signs of dehydration.       Keep all follow-up visits as told by your health care provider. This is important.     This information is not intended to replace advice given to you by your health care provider. Make sure you discuss any questions you have with your health care provider.     Document Released: 01/13/2017Document Revised: 06/05/2020Document Reviewed: 05/28/2019     [employer] Patient Education ? 2021 [employer]

## 2022-03-08 LAB — BACTERIA SPEC AEROBE CULT: NORMAL

## 2022-03-11 ENCOUNTER — HOSPITAL ENCOUNTER (EMERGENCY)
Facility: HOSPITAL | Age: 32
Discharge: HOME | End: 2022-03-12
Attending: EMERGENCY MEDICINE | Admitting: EMERGENCY MEDICINE

## 2022-03-11 DIAGNOSIS — Z3A.21 21 WEEKS GESTATION OF PREGNANCY: ICD-10-CM

## 2022-03-11 DIAGNOSIS — J20.6 ACUTE BRONCHITIS DUE TO RHINOVIRUS: Primary | ICD-10-CM

## 2022-03-11 DIAGNOSIS — J45.901 MODERATE ASTHMA WITH EXACERBATION, UNSPECIFIED WHETHER PERSISTENT: ICD-10-CM

## 2022-03-11 LAB
B PARAPERT DNA SPEC QL NAA+PROBE: NOT DETECTED
B PERT DNA SPEC QL NAA+PROBE: NOT DETECTED
C PNEUM DNA NPH QL NAA+NON-PROBE: NOT DETECTED
FLUAV SUBTYP SPEC NAA+PROBE: NOT DETECTED
FLUBV RNA ISLT QL NAA+PROBE: NOT DETECTED
HADV DNA SPEC NAA+PROBE: NOT DETECTED
HCOV 229E RNA SPEC QL NAA+PROBE: NOT DETECTED
HCOV HKU1 RNA SPEC QL NAA+PROBE: NOT DETECTED
HCOV NL63 RNA SPEC QL NAA+PROBE: NOT DETECTED
HCOV OC43 RNA SPEC QL NAA+PROBE: NOT DETECTED
HMPV RNA NPH QL NAA+NON-PROBE: NOT DETECTED
HOLD SPECIMEN: NORMAL
HOLD SPECIMEN: NORMAL
HPIV1 RNA ISLT QL NAA+PROBE: NOT DETECTED
HPIV2 RNA SPEC QL NAA+PROBE: NOT DETECTED
HPIV3 RNA NPH QL NAA+PROBE: NOT DETECTED
HPIV4 P GENE NPH QL NAA+PROBE: NOT DETECTED
M PNEUMO IGG SER IA-ACNC: NOT DETECTED
RHINOVIRUS RNA SPEC NAA+PROBE: DETECTED
RSV RNA NPH QL NAA+NON-PROBE: NOT DETECTED
SARS-COV-2 RNA NPH QL NAA+NON-PROBE: NOT DETECTED
WHOLE BLOOD HOLD SPECIMEN: NORMAL
WHOLE BLOOD HOLD SPECIMEN: NORMAL

## 2022-03-11 PROCEDURE — 99283 EMERGENCY DEPT VISIT LOW MDM: CPT

## 2022-03-11 PROCEDURE — 25010000002 METHYLPREDNISOLONE PER 125 MG: Performed by: EMERGENCY MEDICINE

## 2022-03-11 PROCEDURE — 96374 THER/PROPH/DIAG INJ IV PUSH: CPT

## 2022-03-11 PROCEDURE — 0202U NFCT DS 22 TRGT SARS-COV-2: CPT | Performed by: EMERGENCY MEDICINE

## 2022-03-11 PROCEDURE — 94640 AIRWAY INHALATION TREATMENT: CPT

## 2022-03-11 PROCEDURE — 99284 EMERGENCY DEPT VISIT MOD MDM: CPT

## 2022-03-11 PROCEDURE — 94799 UNLISTED PULMONARY SVC/PX: CPT

## 2022-03-11 PROCEDURE — 94664 DEMO&/EVAL PT USE INHALER: CPT

## 2022-03-11 RX ORDER — ALBUTEROL SULFATE 2.5 MG/3ML
2.5 SOLUTION RESPIRATORY (INHALATION)
Status: COMPLETED | OUTPATIENT
Start: 2022-03-11 | End: 2022-03-11

## 2022-03-11 RX ORDER — CALCIUM CARBONATE 200(500)MG
2 TABLET,CHEWABLE ORAL ONCE
Status: COMPLETED | OUTPATIENT
Start: 2022-03-11 | End: 2022-03-11

## 2022-03-11 RX ORDER — METHYLPREDNISOLONE SODIUM SUCCINATE 125 MG/2ML
125 INJECTION, POWDER, LYOPHILIZED, FOR SOLUTION INTRAMUSCULAR; INTRAVENOUS ONCE
Status: COMPLETED | OUTPATIENT
Start: 2022-03-11 | End: 2022-03-11

## 2022-03-11 RX ORDER — ALBUTEROL SULFATE 2.5 MG/3ML
2.5 SOLUTION RESPIRATORY (INHALATION) ONCE
Status: COMPLETED | OUTPATIENT
Start: 2022-03-11 | End: 2022-03-12

## 2022-03-11 RX ORDER — SODIUM CHLORIDE 0.9 % (FLUSH) 0.9 %
10 SYRINGE (ML) INJECTION AS NEEDED
Status: DISCONTINUED | OUTPATIENT
Start: 2022-03-11 | End: 2022-03-12 | Stop reason: HOSPADM

## 2022-03-11 RX ADMIN — ALBUTEROL SULFATE 2.5 MG: 2.5 SOLUTION RESPIRATORY (INHALATION) at 22:20

## 2022-03-11 RX ADMIN — METHYLPREDNISOLONE SODIUM SUCCINATE 125 MG: 125 INJECTION, POWDER, FOR SOLUTION INTRAMUSCULAR; INTRAVENOUS at 22:22

## 2022-03-11 RX ADMIN — ANTACID TABLETS 2 TABLET: 500 TABLET, CHEWABLE ORAL at 22:54

## 2022-03-11 RX ADMIN — ALBUTEROL SULFATE 2.5 MG: 2.5 SOLUTION RESPIRATORY (INHALATION) at 22:25

## 2022-03-12 VITALS
WEIGHT: 266.1 LBS | HEIGHT: 67 IN | BODY MASS INDEX: 41.76 KG/M2 | OXYGEN SATURATION: 92 % | HEART RATE: 111 BPM | SYSTOLIC BLOOD PRESSURE: 130 MMHG | DIASTOLIC BLOOD PRESSURE: 81 MMHG | RESPIRATION RATE: 20 BRPM | TEMPERATURE: 98.5 F

## 2022-03-12 PROCEDURE — 94799 UNLISTED PULMONARY SVC/PX: CPT

## 2022-03-12 RX ORDER — ALBUTEROL SULFATE 2.5 MG/3ML
2.5 SOLUTION RESPIRATORY (INHALATION) EVERY 4 HOURS PRN
Qty: 3 ML | Refills: 3 | Status: SHIPPED | OUTPATIENT
Start: 2022-03-12

## 2022-03-12 RX ORDER — METHYLPREDNISOLONE 4 MG/1
TABLET ORAL
Qty: 21 EACH | Refills: 0 | Status: SHIPPED | OUTPATIENT
Start: 2022-03-12 | End: 2022-05-17 | Stop reason: HOSPADM

## 2022-03-12 RX ADMIN — ALBUTEROL SULFATE 2.5 MG: 2.5 SOLUTION RESPIRATORY (INHALATION) at 00:16

## 2022-03-12 NOTE — ED TRIAGE NOTES
Pt to triage from home with c/o asthma attack that started earlier today.  Pt used inhaler and neb w/out relief.  Pt is 21 weeks pregnant, , ob is vinnie.  Pt 98% at triage. Pt wearing mask in triage. Triage personnel wore appropriate PPE

## 2022-03-12 NOTE — DISCHARGE INSTRUCTIONS
Take the steroids as prescribed.  Use your nebulizer 4 times a day.  Rest through the weekend.  Watch your blood sugars closely and follow-up with Dr. Esposito on Monday or return to the emergency room if worse.

## 2022-03-12 NOTE — ED PROVIDER NOTES
EMERGENCY DEPARTMENT ENCOUNTER    Room Number:    Date of encounter:  3/12/2022  PCP: Jacqui Terrazas MD  Historian: Patient      HPI:  Chief Complaint: Asthma attack      Context: Rosana Farnsworth is a 31 y.o. female who presents to the ED c/o asthma attack that started earlier today.  The patient is 21 weeks pregnant.  She states that she used her nebulizer at home twice today with improvement but ran out of her medication and thus is come to the emergency room.  She reports sore throat and mucus in her throat with mild cough but no fevers or chills.  She states that normally when she gets this point she needs steroids.  Patient was initially seen by my partner Dr. Diane who ordered her an albuterol treatment and Solu-Medrol.  She states that this helped with her breathing but then began having heartburn and thus was given a Tums.  Currently the patient states she feels much better and feels like she is moving air better.    PAST MEDICAL HISTORY  Active Ambulatory Problems     Diagnosis Date Noted   • Pneumonia of both lungs due to infectious organism 2018   • Asthma 2018   • Class 3 severe obesity due to excess calories in adult ([facility]) 2018   • Migraine without aura 2018   • Cerebrovascular accident (CVA) ([facility]) 2021     Resolved Ambulatory Problems     Diagnosis Date Noted   • No Resolved Ambulatory Problems     Past Medical History:   Diagnosis Date   • Stroke ([facility])          PAST SURGICAL HISTORY  Past Surgical History:   Procedure Laterality Date   •  SECTION     • CHOLECYSTECTOMY           FAMILY HISTORY  History reviewed. No pertinent family history.      SOCIAL HISTORY  Social History     Socioeconomic History   • Marital status: Single   Tobacco Use   • Smoking status: Former Smoker   Substance and Sexual Activity   • Alcohol use: No   • Sexual activity: Yes     Partners: Male     Birth control/protection: None         ALLERGIES  [place], Nuts,  Amoxicillin, Morphine, Nsaids, Other, Penicillins, Sulfa antibiotics, and Insulin lispro        REVIEW OF SYSTEMS  Review of Systems     Patient denies fevers, chills, headache, neck pain, abdominal pain, vomiting, diarrhea, lower extreme pain, extreme swelling or focal neuro deficit.  All systems reviewed and negative except for those discussed in HPI.     PHYSICAL EXAM    I have reviewed the triage vital signs and nursing notes.    ED Triage Vitals   Temp Heart Rate Resp BP SpO2   03/11/22 2158 03/11/22 2158 03/11/22 2158 03/11/22 2208 03/11/22 2158   98.5 °F (36.9 °C) 120 (!) 30 134/90 98 %      Temp src Heart Rate Source Patient Position BP Location FiO2 (%)   -- 03/11/22 2208 03/11/22 2208 03/11/22 2208 --    Monitor Lying Left arm        GENERAL: 31-year-old well developed, well nourished in mild respiratory distress  HENT: NCAT, neck supple, trachea midline  EYES: no scleral icterus, PERRL, normal conjunctivae  CV: regular rhythm, regular rate, no murmur  RESPIRATORY: unlabored effort, inspiratory x-ray wheezing bilaterally with no accessory muscle use  ABDOMEN: soft, nontender, nondistended, bowel sounds present  MUSCULOSKELETAL: no gross deformity, no pedal edema, no calf tenderness  NEURO: alert,  sensory and motor function of extremities intact, speech clear, mental status normal  SKIN: warm, dry, no rash  PSYCH:  Appropriate mood and affect      PPE  Pt does not present with symptoms for COVID19; however, I was wearing a N95 mask and goggles throughout all patient interaction.    Vital signs and nursing notes reviewed.      LAB RESULTS  Recent Results (from the past 24 hour(s))   Respiratory Panel PCR w/COVID-19(SARS-CoV-2) JEREMIAS/LUIS ANTONIO/ALONA/PAD/COR/MAD/LILIAN In-House, NP Swab in UTM/VTM, 3-4 HR TAT - Swab, Nasopharynx    Collection Time: 03/11/22 10:14 PM    Specimen: Nasopharynx; Swab   Result Value Ref Range    ADENOVIRUS, PCR Not Detected Not Detected    Coronavirus 229E Not Detected Not Detected     Coronavirus HKU1 Not Detected Not Detected    Coronavirus NL63 Not Detected Not Detected    Coronavirus OC43 Not Detected Not Detected    COVID19 Not Detected Not Detected - Ref. Range    Human Metapneumovirus Not Detected Not Detected    Human Rhinovirus/Enterovirus Detected (A) Not Detected    Influenza A PCR Not Detected Not Detected    Influenza B PCR Not Detected Not Detected    Parainfluenza Virus 1 Not Detected Not Detected    Parainfluenza Virus 2 Not Detected Not Detected    Parainfluenza Virus 3 Not Detected Not Detected    Parainfluenza Virus 4 Not Detected Not Detected    RSV, PCR Not Detected Not Detected    Bordetella pertussis pcr Not Detected Not Detected    Bordetella parapertussis PCR Not Detected Not Detected    Chlamydophila pneumoniae PCR Not Detected Not Detected    Mycoplasma pneumo by PCR Not Detected Not Detected   Green Top (Gel)    Collection Time: 03/11/22 10:17 PM   Result Value Ref Range    Extra Tube Hold for add-ons.    Lavender Top    Collection Time: 03/11/22 10:17 PM   Result Value Ref Range    Extra Tube hold for add-on    Gold Top - SST    Collection Time: 03/11/22 10:17 PM   Result Value Ref Range    Extra Tube Hold for add-ons.    Light Blue Top    Collection Time: 03/11/22 10:17 PM   Result Value Ref Range    Extra Tube hold for add-on        Ordered the above labs and independently reviewed the results.        RADIOLOGY  No Radiology Exams Resulted Within Past 24 Hours    I ordered the above noted radiological studies. Independently reviewed by me and discussed with radiologist.  See dictation above for official radiology interpretation.      PROCEDURES    Procedures        MEDICATIONS GIVEN IN ER    Medications   methylPREDNISolone sodium succinate (SOLU-Medrol) injection 125 mg (125 mg Intravenous Given 3/11/22 2222)   albuterol (PROVENTIL) nebulizer solution 0.083% 2.5 mg/3mL (2.5 mg Nebulization Given 3/11/22 2225)   calcium carbonate (TUMS) chewable tablet 500 mg (200 mg  elemental) (2 tablets Oral Given 3/11/22 2254)   albuterol (PROVENTIL) nebulizer solution 0.083% 2.5 mg/3mL (2.5 mg Nebulization Given 3/12/22 0016)         PROGRESS, DATA ANALYSIS, CONSULTS, AND MEDICAL DECISION MAKING    All labs have been independently reviewed by me.  All radiology studies have been reviewed by me and discussed with radiologist dictating report.   EKG's independently reviewed by me.  Discussion below represents my analysis of pertinent findings related to patient's condition, differential diagnosis, treatment plan and final disposition.      ED Course as of 03/12/22 0500   Fri Mar 11, 2022   2214 Patient briefly seen and examined.  Patient complaining of shortness of breath onset this morning.  She has a history of asthma.  She reports that her breathing treatments at home were not providing much relief and she has now run out of her albuterol.  She reports that her whole family had a GI illness a few days ago.  She denies fever.  She denies chest pain.  She is currently 21 weeks pregnant.  She has expiratory wheezing throughout both lung fields and is mildly tachypneic, O2 saturations 91 to 98% on room air.  I performed a limited bedside fetal ultrasound that showed fetal heart tones at 156, good fetal movement.  She denies vaginal bleeding or loss of fluid.  I discussed plan with patient to administer steroids, provide breathing treatment, and also to obtain respiratory viral panel.  She did have Covid last year but has not been vaccinated.  Explained that Dr. Verduzco will be resuming her care shortly. [JR]   2215 Patient denies chest pain.  She reports that she just feels like it is hard to get a good breath. [JR]   2233 Patient reassessed and she reports symptomatic improvement after breathing treatment.  She is requesting sometimes for heartburn that she has been having throughout her pregnancy. [JR]   2337 The patient states she currently feels better after Solu-Medrol and albuterol.  She  is still wheezing so give her a second albuterol dose.  We are currently awaiting her respiratory viral panel.  At this time I discussed chest x-ray with the patient and we will hold off for now.  I will asked the nurse to check fetal heart tones. [GP]   2342 The patient has human rhinovirus. [GP]   Sat Mar 12, 2022   0040 Fetal heart tones of 156. [GP]   0044 After the second albuterol treatment the patient is wheeze free and states she feels markedly better.  Room air oxygen saturations are 96%.  At this point I advised her we will send her home on a steroid taper and she should use her nebulizer 4 times a day.  She is to watch her blood sugars closely and follow-up with her OB-Dr. Esposito next week.  I advised her to return the emergency room if worse.  The patient understands and agrees with the plan. [GP]      ED Course User Index  [GP] Keron Verduzco MD  [JR] Jose Alfredo Diane MD           The differential diagnosis includes but is not limited to pneumonia, congestive heart failure, pulmonary embolism, pleural effusion, acute coronary syndrome, chronic obstructive pulmonary disease exacerbation, or pneumothorax.        AS OF 05:00 EST VITALS:    BP - 130/81  HR - 111  TEMP - 98.5 °F (36.9 °C)  02 SATS - 92%        DIAGNOSIS  Final diagnoses:   Acute bronchitis due to Rhinovirus   Moderate asthma with exacerbation, unspecified whether persistent   21 weeks gestation of pregnancy         DISPOSITION  DISCHARGE    Patient discharged in stable condition.    Reviewed implications of results, diagnosis, meds, responsibility to follow up, warning signs and symptoms of possible worsening, potential complications and reasons to return to ER, including worsening shortness of breath, fever or chest pain.    Patient/Family voiced understanding of above instructions.    Discussed plan for discharge, as there is no emergent indication for admission.  Pt/family is agreeable and understands need for follow up and  repeat testing.  Pt is aware that discharge does not mean that nothing is wrong but it indicates no emergency is present and they must continue care with follow-up as given below or physician of their choice.     FOLLOW-UP  Carlos Esposito MD  [address] [id number] [phone]    In 3 days  For recheck              EMR Dragon/Transcription disclaimer:   Much of this encounter note is an electronic transcription/translation of spoken language to printed text.        Keron Verduzco MD  03/12/22 4210

## 2022-03-16 ENCOUNTER — ROUTINE PRENATAL (OUTPATIENT)
Dept: OBSTETRICS AND GYNECOLOGY | Facility: CLINIC | Age: 32
End: 2022-03-16

## 2022-03-16 VITALS — DIASTOLIC BLOOD PRESSURE: 70 MMHG | SYSTOLIC BLOOD PRESSURE: 126 MMHG | BODY MASS INDEX: 40.97 KG/M2 | WEIGHT: 261.6 LBS

## 2022-03-16 DIAGNOSIS — Z3A.21 21 WEEKS GESTATION OF PREGNANCY: Primary | ICD-10-CM

## 2022-03-16 DIAGNOSIS — Z98.891 HISTORY OF C-SECTION: ICD-10-CM

## 2022-03-16 DIAGNOSIS — O24.414 INSULIN CONTROLLED GESTATIONAL DIABETES MELLITUS (GDM) IN SECOND TRIMESTER: ICD-10-CM

## 2022-03-16 DIAGNOSIS — Z34.82 PRENATAL CARE, SUBSEQUENT PREGNANCY IN SECOND TRIMESTER: ICD-10-CM

## 2022-03-16 LAB
GLUCOSE UR STRIP-MCNC: NEGATIVE MG/DL
PROT UR STRIP-MCNC: NEGATIVE MG/DL

## 2022-03-16 PROCEDURE — 99213 OFFICE O/P EST LOW 20 MIN: CPT | Performed by: OBSTETRICS & GYNECOLOGY

## 2022-03-16 NOTE — PROGRESS NOTES
OB follow up     Rosana Farnsworth is a 31 y.o.  21w5d being seen today for her obstetrical visit.  Patient reports no complaints. Fetal movement: normal.  She has seen Gely with [facility] and has been started on long-acting insulin at nights.  We will continue follow-up with them and has another appointment in 2 weeks and will also see Dr. Christine on that visit.  She has no other complaints at today's anatomy ultrasound looks appropriate.    Her prenatal care is complicated by (and status): A2 gestational diabetes and prior  x2    Review of Systems  Cramping/contractions : Negative  Vaginal bleeding: Negative  Fetal movement normal    /70   Wt 119 kg (261 lb 9.6 oz)   LMP 10/09/2021   BMI 40.97 kg/m²     FHT:  140s BPM   Uterine Size: size equals dates       Assessment    Diagnoses and all orders for this visit:    1. 21 weeks gestation of pregnancy (Primary)    2. Insulin controlled gestational diabetes mellitus (GDM) in second trimester    3. Prenatal care, subsequent pregnancy in second trimester    4. History of         1) pregnancy at 21w5d         Plan    Reviewed this stage of pregnancy  Problem list updated   Follow up in 4 weeks.  She will sign PBS consent.  We will not duplicate ultrasounds here going forward since she is seeing [facility].  I spent 20 minutes caring for Rosana on this date of service. This time includes time spent by me in the following activities: preparing for the visit, reviewing tests, obtaining and/or reviewing a separately obtained history, counseling and educating the patient/family/caregiver and documenting information in the medical record      Carlos Esposito MD   3/16/2022  14:57 EDT

## 2022-04-13 ENCOUNTER — ROUTINE PRENATAL (OUTPATIENT)
Dept: OBSTETRICS AND GYNECOLOGY | Facility: CLINIC | Age: 32
End: 2022-04-13

## 2022-04-13 VITALS — SYSTOLIC BLOOD PRESSURE: 130 MMHG | DIASTOLIC BLOOD PRESSURE: 70 MMHG | WEIGHT: 289 LBS | BODY MASS INDEX: 45.26 KG/M2

## 2022-04-13 DIAGNOSIS — Z34.82 PRENATAL CARE, SUBSEQUENT PREGNANCY IN SECOND TRIMESTER: ICD-10-CM

## 2022-04-13 DIAGNOSIS — O24.414 INSULIN CONTROLLED GESTATIONAL DIABETES MELLITUS (GDM) IN SECOND TRIMESTER: ICD-10-CM

## 2022-04-13 DIAGNOSIS — Z3A.25 25 WEEKS GESTATION OF PREGNANCY: Primary | ICD-10-CM

## 2022-04-13 DIAGNOSIS — Z98.891 HISTORY OF C-SECTION: ICD-10-CM

## 2022-04-13 LAB
GLUCOSE UR STRIP-MCNC: NEGATIVE MG/DL
PROT UR STRIP-MCNC: NEGATIVE MG/DL

## 2022-04-13 PROCEDURE — 99213 OFFICE O/P EST LOW 20 MIN: CPT | Performed by: OBSTETRICS & GYNECOLOGY

## 2022-04-13 NOTE — PROGRESS NOTES
OB follow up     Rosana Farnsworth is a 31 y.o.  25w5d being seen today for her obstetrical visit.  Patient reports no complaints. Fetal movement: normal.  She is still being followed by maternal-fetal medicine at [facility].  She had a recent ultrasound showed normal growth and normal amniotic fluid volume.  She is now on a long-acting insulin in the evening and then takes short acting insulin 3 times a day.    Her prenatal care is complicated by (and status): Class A2 gestational diabetes and prior  and obesity during pregnancy    Review of Systems  Cramping/contractions : Negative  Vaginal bleeding: Negative  Fetal movement normal    /70   Wt 131 kg (289 lb)   LMP 10/09/2021   BMI 45.26 kg/m²     FHT:  140s BPM   Uterine Size: size equals dates       Assessment    Problems Addressed this Visit    None     Visit Diagnoses     25 weeks gestation of pregnancy    -  Primary    Prenatal care, subsequent pregnancy in second trimester        Insulin controlled gestational diabetes mellitus (GDM) in second trimester        History of           Diagnoses       Codes Comments    25 weeks gestation of pregnancy    -  Primary ICD-10-CM: Z3A.25  ICD-9-CM: V22.2     Prenatal care, subsequent pregnancy in second trimester     ICD-10-CM: Z34.82  ICD-9-CM: V22.1     Insulin controlled gestational diabetes mellitus (GDM) in second trimester     ICD-10-CM: O24.414  ICD-9-CM: 648.83     History of      ICD-10-CM: Z98.891  ICD-9-CM: V45.89           1) pregnancy at 25w5d         Plan    Reviewed this stage of pregnancy  Problem list updated   Follow up in 4 weeks.  She will see [facility] for another ultrasound on 2022.  I will see her 2 weeks after that.  We will then start to look into arranging  testing in conjunction with her [facility] appointments.  I spent 20 minutes caring for Rosana on this date of service. This time includes time spent by me in the following activities: preparing for  the visit, reviewing tests, obtaining and/or reviewing a separately obtained history, performing a medically appropriate examination and/or evaluation, counseling and educating the patient/family/caregiver and documenting information in the medical record      Carlos Esposito MD   4/13/2022  14:47 EDT

## 2022-05-09 ENCOUNTER — HOSPITAL ENCOUNTER (EMERGENCY)
Facility: HOSPITAL | Age: 32
End: 2022-05-09

## 2022-05-09 ENCOUNTER — HOSPITAL ENCOUNTER (EMERGENCY)
Facility: HOSPITAL | Age: 32
Discharge: HOME | End: 2022-05-09
Attending: OBSTETRICS & GYNECOLOGY | Admitting: OBSTETRICS & GYNECOLOGY

## 2022-05-09 VITALS
OXYGEN SATURATION: 97 % | HEART RATE: 92 BPM | RESPIRATION RATE: 18 BRPM | DIASTOLIC BLOOD PRESSURE: 68 MMHG | TEMPERATURE: 98 F | SYSTOLIC BLOOD PRESSURE: 124 MMHG

## 2022-05-09 LAB
BILIRUB UR QL STRIP: NEGATIVE
CLARITY UR: CLEAR
COLOR UR: YELLOW
GLUCOSE UR STRIP-MCNC: NEGATIVE MG/DL
HGB UR QL STRIP.AUTO: NEGATIVE
KETONES UR QL STRIP: ABNORMAL
LEUKOCYTE ESTERASE UR QL STRIP.AUTO: NEGATIVE
NITRITE UR QL STRIP: NEGATIVE
PH UR STRIP.AUTO: 6 [PH] (ref 5–8)
PROT UR QL STRIP: NEGATIVE
SP GR UR STRIP: 1.03 (ref 1–1.03)
UROBILINOGEN UR QL STRIP: ABNORMAL

## 2022-05-09 PROCEDURE — 59025 FETAL NON-STRESS TEST: CPT

## 2022-05-09 PROCEDURE — 81003 URINALYSIS AUTO W/O SCOPE: CPT | Performed by: OBSTETRICS & GYNECOLOGY

## 2022-05-09 PROCEDURE — 99283 EMERGENCY DEPT VISIT LOW MDM: CPT | Performed by: OBSTETRICS & GYNECOLOGY

## 2022-05-09 RX ORDER — INSULIN GLARGINE 100 [IU]/ML
INJECTION, SOLUTION SUBCUTANEOUS
COMMUNITY
Start: 2022-03-14 | End: 2022-07-01 | Stop reason: HOSPADM

## 2022-05-09 RX ORDER — PEN NEEDLE, DIABETIC 32GX 5/32"
NEEDLE, DISPOSABLE MISCELLANEOUS
COMMUNITY
Start: 2022-04-09

## 2022-05-09 RX ORDER — INSULIN ASPART 100 [IU]/ML
INJECTION, SOLUTION INTRAVENOUS; SUBCUTANEOUS
COMMUNITY
Start: 2022-03-14 | End: 2022-07-01 | Stop reason: HOSPADM

## 2022-05-10 ENCOUNTER — ROUTINE PRENATAL (OUTPATIENT)
Dept: OBSTETRICS AND GYNECOLOGY | Facility: CLINIC | Age: 32
End: 2022-05-10

## 2022-05-10 VITALS — WEIGHT: 290.2 LBS | BODY MASS INDEX: 45.45 KG/M2 | SYSTOLIC BLOOD PRESSURE: 124 MMHG | DIASTOLIC BLOOD PRESSURE: 70 MMHG

## 2022-05-10 DIAGNOSIS — Z98.891 HISTORY OF C-SECTION: ICD-10-CM

## 2022-05-10 DIAGNOSIS — Z34.83 PRENATAL CARE, SUBSEQUENT PREGNANCY IN THIRD TRIMESTER: Primary | ICD-10-CM

## 2022-05-10 DIAGNOSIS — O24.414 INSULIN CONTROLLED GESTATIONAL DIABETES MELLITUS (GDM) IN SECOND TRIMESTER: ICD-10-CM

## 2022-05-10 DIAGNOSIS — Z3A.29 29 WEEKS GESTATION OF PREGNANCY: ICD-10-CM

## 2022-05-10 LAB
GLUCOSE UR STRIP-MCNC: NEGATIVE MG/DL
PROT UR STRIP-MCNC: NEGATIVE MG/DL

## 2022-05-10 PROCEDURE — 99213 OFFICE O/P EST LOW 20 MIN: CPT | Performed by: OBSTETRICS & GYNECOLOGY

## 2022-05-10 NOTE — OBED NOTES
[facility] Note [facility]        Patient Name: Rosana Farnsworth  YOB: 1990  MRN: [id number]  Admission Date: 2022  8:28 PM  Date of Service: 2022    Chief Complaint: Abdominal Pain (Patient to [facility] with complaints of LRQ abdomnial pain that started around 1500 that is constant sharp/throbbing pain. Rates pain a 6. Denies VB, LOF, CTX. States +FM. /)        Subjective     Rosana Farnsworth is a 31 y.o. female  at 29w3d with Estimated Date of Delivery: 22 who presents with the chief complaint listed above.  She sees Carlos Esposito MD for her prenatal care. Her pregnancy has been complicated by:  obesity, migraines, history of CVA, asthma, history of c/section, history of pre-eclampsia.    Patient with RLQ pain today that radiates to her back.  She reports it as constant and achy.  She denies fever, chills, or urinary symptoms.  She has not taken anything for pain and reports gardening earlier today.      She describes fetal movement as normal.  She denies rupture of membranes.  She denies vaginal bleeding. She is not feeling contractions.          Objective   Patient Active Problem List    Diagnosis    • Cerebrovascular accident (CVA) ([facility]) [I63.9]    • Pneumonia of both lungs due to infectious organism [J18.9]    • Asthma [J45.909]    • Class 3 severe obesity due to excess calories in adult ([facility]) [E66.01]    • Migraine without aura [G43.009]         OB History    Para Term  AB Living   3 2 1 1 0 2   SAB IAB Ectopic Molar Multiple Live Births   0 0 0 0 0 2      # Outcome Date GA Lbr Mil/2nd Weight Sex Delivery Anes PTL Lv   3 Current            2 Term 20 37w3d  3110 g (6 lb 13.7 oz) M CS-LTranv Spinal  VARGHESE      Name: LAURYN FARNSWORTH      Apgar1: 9  Apgar5: 9   1  17 36w5d  3990 g (8 lb 12.7 oz) M CS-LTranv   [facility]ESE      Name: LAURYN FARNSWORTH      Apgar1: 8  Apgar5: 9      Obstetric Comments   7.5 weeks pregnant         Past Medical History:    Diagnosis Date   • Asthma    • Stroke (HCC)        Past Surgical History:   Procedure Laterality Date   •  SECTION     • CHOLECYSTECTOMY         Current Facility-Administered Medications on File Prior to Encounter   Medication Dose Route Frequency Provider Last Rate Last Admin   • albuterol sulfate HFA (PROVENTIL HFA;VENTOLIN HFA;PROAIR HFA) inhaler 2 puff  2 puff Inhalation Q6H PRN Carlos Esposito MD         Current Outpatient Medications on File Prior to Encounter   Medication Sig Dispense Refill   • insulin aspart (novoLOG FLEXPEN) 100 UNIT/ML solution pen-injector sc pen 1:10 carb ratio at meals and correction scale > 120, max TDD 80 units.     • albuterol (PROVENTIL) (2.5 MG/3ML) 0.083% nebulizer solution Take 2.5 mg by nebulization Every 4 (Four) Hours As Needed for Wheezing. 3 mL 3   • aspirin 325 MG tablet Take 1 tablet by mouth Daily. 30 tablet 1   • BD Pen Needle Molly 2nd Gen 32G X 4 MM misc      • calcium carbonate (OS-HARVEY) 1250 (500 Ca) MG chewable tablet Chew 1 tablet Daily.     • Calcium Carbonate-Vit D-Min (CALCIUM 1200 PO) Take  by mouth.     • EPINEPHrine (EPIPEN) 0.3 MG/0.3ML solution auto-injector injection Inject 0.3 mg into the appropriate muscle as directed by prescriber.     • famotidine (PEPCID) 20 MG tablet Take 20 mg by mouth.     • ferrous sulfate 325 (65 FE) MG tablet Take 325 mg by mouth Daily With Breakfast.     • Glucose Blood (Blood Glucose Test) strip 1 each by In Vitro route 4 (Four) Times a Day. Test fasting and 2 hours after meals 120 each 3   • glucose monitor monitoring kit 1 each 4 (Four) Times a Day. 1 each 0   • Insulin Aspart FlexPen 100 UNIT/ML solution pen-injector      • L-Methylfolate-Algae 7.5-90.314 MG capsule Take 1 tablet by mouth Daily.     • Lancets (freestyle) lancets Test  each 12   • Lantus SoloStar 100 UNIT/ML injection pen      • methylPREDNISolone (MEDROL) 4 MG dose pack Take as directed on package instructions. 21 each 0   • ondansetron  ODT (Zofran ODT) 4 MG disintegrating tablet Place 1 tablet on the tongue Every 8 (Eight) Hours As Needed for Nausea or Vomiting. 30 tablet 3   • Prenatal Vit-Fe Fumarate-FA (PRENATAL, CLASSIC, VITAMIN) 28-0.8 MG tablet tablet Take  by mouth daily.     • ProAir  (90 Base) MCG/ACT inhaler INHALE TWO PUFFS BY MOUTH EVERY 4 HOURS AS NEEDED FOR WHEEZING 8.5 g 1   • sertraline (ZOLOFT) 100 MG tablet 200 mg Daily.         Allergies   Allergen Reactions   • Dearing Anaphylaxis and Shortness Of Breath   • Nuts Anaphylaxis   • Amoxicillin    • Morphine Other (See Comments)     headache   • Nsaids Unknown - Low Severity     No allergy; just cant have due to gastric sleeve   • Other      Tree nut    • Penicillins    • Sulfa Antibiotics Hives   • Insulin Lispro Hives and Itching       History reviewed. No pertinent family history.    Social History     Socioeconomic History   • Marital status: Single   Tobacco Use   • Smoking status: Former Smoker   Substance and Sexual Activity   • Alcohol use: No   • Sexual activity: Yes     Partners: Male     Birth control/protection: None           Review of Systems   Constitutional: Negative for chills, fatigue and fever.   HENT: Negative for congestion, rhinorrhea and sore throat.    Eyes: Negative for visual disturbance.   Respiratory: Negative.    Cardiovascular: Negative.    Gastrointestinal: Positive for abdominal pain. Negative for constipation, diarrhea, nausea and vomiting.   Genitourinary: Negative for difficulty urinating, dyspareunia, dysuria, flank pain, frequency, genital sores, hematuria, urgency, vaginal bleeding, vaginal discharge and vaginal pain.   Neurological: Negative for dizziness, seizures, light-headedness and headaches.   Psychiatric/Behavioral: Negative for sleep disturbance. The patient is not nervous/anxious.           PHYSICAL EXAM:      VITAL SIGNS:  Vitals:    05/09/22 2050 05/09/22 2055   BP:  124/68   Pulse: 105 92   Resp:  18   Temp:  98 °F (36.7 °C)    TempSrc:  Oral   SpO2: 98% 97%        FHT'S:                   Baseline:  135 BPM  Variability:  Moderate = 6 - 25 BPM  Accelerations:  15 x 15 accelerations present     Decelerations:  absent  Contractions:   absent     Interpretation:    Reactive NST, CAT 1 tracing        PHYSICAL EXAM:    General: well developed; well nourished  no acute distress   Heart: Not performed.   Lungs  : breathing is unlabored  Soft, nontender over fundus.  TTP over myofascial plane in area of round ligament bilaterally, R>L.  No CVAT present bilaterally.  No hernia noted.   Abdomen:        Cervix: was checked (by RN): 0 cm / 1 % / -3      Contractions: none        Extremities: peripheral pulses normal, no pedal edema, no clubbing or cyanosis      LABS AND TESTING ORDERED:  1. Uterine and fetal monitoring  2. Urinalysis      LAB RESULTS:    Recent Results (from the past 24 hour(s))   Urinalysis With Microscopic If Indicated (No Culture) - Urine, Clean Catch    Collection Time: 22  9:15 PM    Specimen: Urine, Clean Catch   Result Value Ref Range    Color, UA Yellow Yellow, Straw    Appearance, UA Clear Clear    pH, UA 6.0 5.0 - 8.0    Specific Gravity, UA 1.026 1.005 - 1.030    Glucose, UA Negative Negative    Ketones, UA 80 mg/dL (3+) (A) Negative    Bilirubin, UA Negative Negative    Blood, UA Negative Negative    Protein, UA Negative Negative    Leuk Esterase, UA Negative Negative    Nitrite, UA Negative Negative    Urobilinogen, UA 1.0 E.U./dL 0.2 - 1.0 E.U./dL       Lab Results   Component Value Date    ABO O 2021    RH Positive 2021       No results found for: STREPGPB              Assessment/Plan     ASSESSMENT/PLAN:  Rosana Farnsworth is a 31 y.o. female  at 29w3d who presented with: RLQ pain consistent with round ligament discomfort of pregnancy.  UA does not show evidence of infection but does indicate some dehydration with ketones present.  No evidence of PTL with closed cervix.  Patient reassured.   We discussed warm compresses/baths, tylenol use, and belly band use to help with discomfort.  She was advised to avoid strenuous activity for the next day or so and to hydrate.  She has follow up in clinic scheduled tomorrow.  She was discharged home with return precautions reviewed.          Final Impression:  • Pregnancy at 29w3d  • Reactive NST.  CAT 1 tracing  • Round ligament pain of pregnancy  • Maternal vital signs were reviewed and were unremarkable              Vitals:    05/09/22 2050 05/09/22 2055   BP:  124/68   Pulse: 105 92   Resp:  18   Temp:  98 °F (36.7 °C)   TempSrc:  Oral   SpO2: 98% 97%   •     • No results found for: STREPGPB  Lab Results   Component Value Date    ABO O 11/22/2021    RH Positive 11/22/2021   •   • COVID - 19 status unknown      PLAN:       I have spent 30 minutes including face to face time with the patient, greater than 50% in discussion of the diagnosis (counseling) and/or coordination of care.     eDbora Stahl MD  5/9/2022  21:36 EDT  OB Hospitalist  Phone:  [phone]

## 2022-05-10 NOTE — PROGRESS NOTES
OB follow up     Rosana Farnsworth is a 31 y.o.  29w4d being seen today for her obstetrical visit.  Patient reports no complaints. Fetal movement: normal.  She continues with good glucose control managed with [facility].  She has another appointment for growth ultrasound assessment of amniotic fluid on May 23, 2022.    Her prenatal care is complicated by (and status): Prior  section and class A2 gestational diabetes    Review of Systems  Cramping/contractions : None reported  Vaginal bleeding: Negative  Fetal movement normal    /70   Wt 132 kg (290 lb 3.2 oz)   LMP 10/09/2021   BMI 45.45 kg/m²     FHT:  140s BPM   Uterine Size: size equals dates       Assessment    Diagnoses and all orders for this visit:    1. Prenatal care, subsequent pregnancy in third trimester (Primary)    2. 29 weeks gestation of pregnancy    3. Insulin controlled gestational diabetes mellitus (GDM) in second trimester    4. History of         1) pregnancy at 29w4d         Plan    Reviewed this stage of pregnancy  Problem list updated   Follow up in 3 weeks.  We will likely begin weekly  testing in conjunction with [facility] recommendations.  Will likely be moving toward delivery at 38 weeks with scheduled repeat  and tubal sterilization.  I spent 20 minutes caring for Rosana on this date of service. This time includes time spent by me in the following activities: preparing for the visit, reviewing tests, obtaining and/or reviewing a separately obtained history, performing a medically appropriate examination and/or evaluation, counseling and educating the patient/family/caregiver and documenting information in the medical record      Carlos Esposito MD   5/10/2022  13:29 EDT

## 2022-05-17 ENCOUNTER — HOSPITAL ENCOUNTER (EMERGENCY)
Facility: HOSPITAL | Age: 32
Discharge: HOME | End: 2022-05-17
Attending: OBSTETRICS & GYNECOLOGY | Admitting: OBSTETRICS & GYNECOLOGY

## 2022-05-17 VITALS
SYSTOLIC BLOOD PRESSURE: 119 MMHG | RESPIRATION RATE: 18 BRPM | BODY MASS INDEX: 45.73 KG/M2 | HEART RATE: 84 BPM | WEIGHT: 292 LBS | DIASTOLIC BLOOD PRESSURE: 70 MMHG

## 2022-05-17 LAB
BACTERIA UR QL AUTO: ABNORMAL /HPF
BILIRUB UR QL STRIP: NEGATIVE
CLARITY UR: ABNORMAL
COLOR UR: YELLOW
FIBRONECTIN FETAL VAG QL: NEGATIVE
GLUCOSE UR STRIP-MCNC: NEGATIVE MG/DL
HGB UR QL STRIP.AUTO: NEGATIVE
HYALINE CASTS UR QL AUTO: ABNORMAL /LPF
KETONES UR QL STRIP: ABNORMAL
LEUKOCYTE ESTERASE UR QL STRIP.AUTO: ABNORMAL
NITRITE UR QL STRIP: NEGATIVE
PH UR STRIP.AUTO: 8 [PH] (ref 5–8)
PROT UR QL STRIP: NEGATIVE
RBC # UR STRIP: ABNORMAL /HPF
REF LAB TEST METHOD: ABNORMAL
SP GR UR STRIP: 1.02 (ref 1–1.03)
SQUAMOUS #/AREA URNS HPF: ABNORMAL /HPF
UROBILINOGEN UR QL STRIP: ABNORMAL
WBC # UR STRIP: ABNORMAL /HPF

## 2022-05-17 PROCEDURE — 82731 ASSAY OF FETAL FIBRONECTIN: CPT | Performed by: OBSTETRICS & GYNECOLOGY

## 2022-05-17 PROCEDURE — 81001 URINALYSIS AUTO W/SCOPE: CPT | Performed by: OBSTETRICS & GYNECOLOGY

## 2022-05-17 PROCEDURE — 99284 EMERGENCY DEPT VISIT MOD MDM: CPT | Performed by: OBSTETRICS & GYNECOLOGY

## 2022-05-17 PROCEDURE — 59025 FETAL NON-STRESS TEST: CPT

## 2022-05-18 NOTE — TELEPHONE ENCOUNTER
Good morning,     Patient is requesting a refill on Rx - ProAir  (90 Base) MCG/ACT inhaler [14074]     If possible by provider. Pharmacy states it just needs approval from provider.     Please advise,   Thank you     Pharmacy confirmed - [id number] - [place], [place] - [address] [place] [address] [id number] - [phone]  - [phone] FX

## 2022-05-18 NOTE — OBED NOTES
[facility] Note OB        Patient Name: Rosana Farnsworth  YOB: 1990  MRN: [id number]  Admission Date: 2022  8:29 PM  Date of Service: 2022    Chief Complaint: Abdominal Cramping (Patient states here for cramps that are on and off. Rating pain a 7. +FM. Denies VB, LOF.)        Subjective     Rosana Farnsworth is a 31 y.o. female  at 30w4d with Estimated Date of Delivery: 22 who presents with the chief complaint listed above.  She sees Carlos Esposito MD for her prenatal care. Her pregnancy has been complicated by:  Prior C section x 2, Insulin requiring Gestational Diabetes, Morbid obesity with BMI 45.73.  She presents secondary to abdominal cramping and has a history of polyhydramnios related to Gestational Diabetes.    She describes fetal movement as normal.  She denies rupture of membranes.  She denies vaginal bleeding. She is possibly feeling contractions          Objective   Patient Active Problem List    Diagnosis    • Cerebrovascular accident (CVA) ([facility]) [I63.9]    • Pneumonia of both lungs due to infectious organism [J18.9]    • Asthma [J45.909]    • Class 3 severe obesity due to excess calories in adult ([facility]) [E66.01]    • Migraine without aura [G43.009]         OB History    Para Term  AB Living   3 2 1 1 0 2   SAB IAB Ectopic Molar Multiple Live Births   0 0 0 0 0 2      # Outcome Date GA Lbr Mil/2nd Weight Sex Delivery Anes PTL Lv   3 Current            2 Term 20 37w3d  3110 g (6 lb 13.7 oz) M CS-LTranv Spinal  VARGHESE      Name: LAURYN FARNSWORTH      Apgar1: 9  Apgar5: 9   1  17 36w5d  3990 g (8 lb 12.7 oz) M CS-LTranv   VARGHESE      Name: LAURYN FARNSWORTH      Apgar1: 8  Apgar5: 9      Obstetric Comments   7.5 weeks pregnant         Past Medical History:   Diagnosis Date   • Asthma    • Stroke ([facility])        Past Surgical History:   Procedure Laterality Date   •  SECTION     • CHOLECYSTECTOMY         No current  facility-administered medications on file prior to encounter.     Current Outpatient Medications on File Prior to Encounter   Medication Sig Dispense Refill   • albuterol (PROVENTIL) (2.5 MG/3ML) 0.083% nebulizer solution Take 2.5 mg by nebulization Every 4 (Four) Hours As Needed for Wheezing. 3 mL 3   • BD Pen Needle Molly 2nd Gen 32G X 4 MM misc      • calcium carbonate (OS-HARVEY) 1250 (500 Ca) MG chewable tablet Chew 1 tablet Daily.     • Calcium Carbonate-Vit D-Min (CALCIUM 1200 PO) Take  by mouth.     • EPINEPHrine (EPIPEN) 0.3 MG/0.3ML solution auto-injector injection Inject 0.3 mg into the appropriate muscle as directed by prescriber.     • famotidine (PEPCID) 20 MG tablet Take 20 mg by mouth.     • ferrous sulfate 325 (65 FE) MG tablet Take 325 mg by mouth Daily With Breakfast.     • Glucose Blood (Blood Glucose Test) strip 1 each by In Vitro route 4 (Four) Times a Day. Test fasting and 2 hours after meals 120 each 3   • glucose monitor monitoring kit 1 each 4 (Four) Times a Day. 1 each 0   • insulin aspart (novoLOG FLEXPEN) 100 UNIT/ML solution pen-injector sc pen 1:10 carb ratio at meals and correction scale > 120, max TDD 80 units.     • Insulin Aspart FlexPen 100 UNIT/ML solution pen-injector      • L-Methylfolate-Algae 7.5-90.314 MG capsule Take 1 tablet by mouth Daily.     • Lancets (freestyle) lancets Test  each 12   • Lantus SoloStar 100 UNIT/ML injection pen      • methylPREDNISolone (MEDROL) 4 MG dose pack Take as directed on package instructions. 21 each 0   • ondansetron ODT (Zofran ODT) 4 MG disintegrating tablet Place 1 tablet on the tongue Every 8 (Eight) Hours As Needed for Nausea or Vomiting. 30 tablet 3   • Prenatal Vit-Fe Fumarate-FA (PRENATAL, CLASSIC, VITAMIN) 28-0.8 MG tablet tablet Take  by mouth daily.     • ProAir  (90 Base) MCG/ACT inhaler INHALE TWO PUFFS BY MOUTH EVERY 4 HOURS AS NEEDED FOR WHEEZING 8.5 g 1   • sertraline (ZOLOFT) 100 MG tablet 200 mg Daily.          Allergies   Allergen Reactions   • [place] Anaphylaxis and Shortness Of Breath   • Nuts Anaphylaxis   • Amoxicillin    • Morphine Other (See Comments)     headache   • Nsaids Unknown - Low Severity     No allergy; just cant have due to gastric sleeve   • Other      Tree nut    • Penicillins    • Sulfa Antibiotics Hives   • Insulin Lispro Hives and Itching       No family history on file.    Social History     Socioeconomic History   • Marital status: Single   Tobacco Use   • Smoking status: Former Smoker   Substance and Sexual Activity   • Alcohol use: No   • Sexual activity: Yes     Partners: Male     Birth control/protection: None           Review of Systems   Constitutional: Negative for chills, fatigue and fever.   HENT: Negative.    Eyes: Negative for photophobia and visual disturbance.   Respiratory: Negative for cough, chest tightness and shortness of breath.    Cardiovascular: Negative for chest pain and leg swelling.   Gastrointestinal: Positive for abdominal pain ( abdominal cramping). Negative for diarrhea, nausea and vomiting.   Genitourinary: Negative for dysuria, flank pain, hematuria, pelvic pain, vaginal bleeding and vaginal discharge.   Musculoskeletal: Negative for back pain.   Neurological: Negative for dizziness, seizures, weakness and headaches.          PHYSICAL EXAM:      VITAL SIGNS:  Vitals:    05/17/22 2031 05/17/22 2048   BP:  119/70   Pulse:  84   Resp:  18   Weight: 132 kg (292 lb)         FHT'S:                   Baseline:  120-125 BPM  Variability:  Moderate = 6 - 25 BPM  Accelerations:  15 x 15 accelerations present     Decelerations:  absent  Contractions:   absent     Interpretation:   Reactive NST, CAT 1 tracing (note difficult to keep on monitor secondary to fetal movement and polyhydramnios)        PHYSICAL EXAM:    General: well developed; well nourished  no acute distress   Heart: Not performed.   Lungs: breathing is unlabored     Abdomen: soft, non-tender; no masses  no  umbilical or inguinal hernias are present       Cervix: was examined by nurse, FFN collected  Cervical Dilation (cm): 0  Cervical Effacement: 0%  Fetal Station: -3  Cervical Consistency: firm  Cervical Position: posterior   Contractions: minimal irritability        Extremities: peripheral pulses normal, no pedal edema, no clubbing or cyanosis      LABS AND TESTING ORDERED:  1. Uterine and fetal monitoring  2. Urinalysis  3. FFN    LAB RESULTS:    Recent Results (from the past 24 hour(s))   Fetal Fibronectin - Vaginal Fluid, Cervix    Collection Time: 22  9:10 PM    Specimen: Cervix; Vaginal Fluid   Result Value Ref Range    Fetal Fibronectin Negative Negative   Urinalysis With Microscopic If Indicated (No Culture) - Urine, Clean Catch    Collection Time: 22  9:10 PM    Specimen: Urine, Clean Catch   Result Value Ref Range    Color, UA Yellow Yellow, Straw    Appearance, UA Cloudy (A) Clear    pH, UA 8.0 5.0 - 8.0    Specific Gravity, UA 1.016 1.005 - 1.030    Glucose, UA Negative Negative    Ketones, UA 15 mg/dL (1+) (A) Negative    Bilirubin, UA Negative Negative    Blood, UA Negative Negative    Protein, UA Negative Negative    Leuk Esterase, UA Moderate (2+) (A) Negative    Nitrite, UA Negative Negative    Urobilinogen, UA 0.2 E.U./dL 0.2 - 1.0 E.U./dL   Urinalysis, Microscopic Only - Urine, Clean Catch    Collection Time: 22  9:10 PM    Specimen: Urine, Clean Catch   Result Value Ref Range    RBC, UA 0-2 None Seen, 0-2 /HPF    WBC, UA 6-12 (A) None Seen, 0-2 /HPF    Bacteria, UA 1+ (A) None Seen /HPF    Squamous Epithelial Cells, UA 7-12 (A) None Seen, 0-2 /HPF    Hyaline Casts, UA 0-2 None Seen /LPF    Methodology Automated Microscopy        Lab Results   Component Value Date    ABO O 2021    RH Positive 2021       No results found for: STREPGPB            Assessment & Plan   Rosana Farnsworth is a 31 y.o. female  at 30w4d who presented with cramping, her pregnancy is  complicated by prior c section x 2, morbid obesity, and insulin requiring gestational diabetes.She was observed for labor but did not change her cervix which was noted to be Cervical Dilation (cm): 0 cm/ Cervical Effacement: 0% % effaced/ vertex at Fetal Station: -3 station.  No contractions on the monitor and FFN was noted to be negative.  She was noted to have a Reactive NST and was watched for approximately 1.5 hour(s).   In view of this she was discharged to home.    Final Impression:  • Pregnancy at 30w4d  •    • Prior C section x 2  • Type A 2 insulin requiring Gest Diabetes  • Polyhydramnios   • False labor before 37 weeks gestation in the third trimester  • Reactive NST, CAT 1 tracing, Reassuring fetal status  •   • Maternal vital signs were reviewed and were unremarkable   •                 Vitals:    22   BP:  119/70   Pulse:  84   Resp:  18   Weight: 132 kg (292 lb)      • She was discharged to home     PLAN:  • Discharge to home  • She will continue her home meds          Your medication list      CONTINUE taking these medications      Instructions Last Dose Given Next Dose Due   BD Pen Needle Molly 2nd Gen 32G X 4 MM misc  Generic drug: Insulin Pen Needle           Blood Glucose Test strip      1 each by In Vitro route 4 (Four) Times a Day. Test fasting and 2 hours after meals       CALCIUM 1200 PO      Take  by mouth.       calcium carbonate 1250 (500 Ca) MG chewable tablet  Commonly known as: OS-HARVEY      Chew 1 tablet Daily.       EPINEPHrine 0.3 MG/0.3ML solution auto-injector injection  Commonly known as: EPIPEN      Inject 0.3 mg into the appropriate muscle as directed by prescriber.       famotidine 20 MG tablet  Commonly known as: PEPCID      Take 20 mg by mouth.       ferrous sulfate 325 (65 FE) MG tablet      Take 325 mg by mouth Daily With Breakfast.       freestyle lancets      Test QID       glucose monitor monitoring kit      1 each 4 (Four) Times a Day.        Insulin Aspart FlexPen 100 UNIT/ML solution pen-injector           insulin aspart 100 UNIT/ML solution pen-injector sc pen  Commonly known as: novoLOG FLEXPEN      1:10 carb ratio at meals and correction scale > 120, max TDD 80 units.       L-Methylfolate-Algae 7.5-90.314 MG capsule      Take 1 tablet by mouth Daily.       Lantus SoloStar 100 UNIT/ML injection pen  Generic drug: Insulin Glargine           ondansetron ODT 4 MG disintegrating tablet  Commonly known as: Zofran ODT      Place 1 tablet on the tongue Every 8 (Eight) Hours As Needed for Nausea or Vomiting.       prenatal (CLASSIC) vitamin  tablet  Generic drug: prenatal vitamin      Take  by mouth daily.       ProAir  (90 Base) MCG/ACT inhaler  Generic drug: albuterol sulfate HFA      INHALE TWO PUFFS BY MOUTH EVERY 4 HOURS AS NEEDED FOR WHEEZING       albuterol (2.5 MG/3ML) 0.083% nebulizer solution  Commonly known as: PROVENTIL      Take 2.5 mg by nebulization Every 4 (Four) Hours As Needed for Wheezing.       sertraline 100 MG tablet  Commonly known as: ZOLOFT      200 mg Daily.          STOP taking these medications    methylPREDNISolone 4 MG dose pack  Commonly known as: MEDROL               1. She will follow up with Carlos Esposito MD as scheduled and as needed  2. She has been instructed to return for contractions, vaginal bleeding, Rupture of membranes, decreased fetal movement or other concern  3. She may call the office or [facility] with questions.    I have spent 40 minutes including face to face time with the patient, greater than 50% in discussion of the diagnosis (counseling) and/or coordination of care.     José Manuel Donohue MD  5/17/2022  22:26 EDT  OB Hospitalist  Phone:    [phone]

## 2022-05-31 ENCOUNTER — ROUTINE PRENATAL (OUTPATIENT)
Dept: OBSTETRICS AND GYNECOLOGY | Facility: CLINIC | Age: 32
End: 2022-05-31

## 2022-05-31 VITALS — WEIGHT: 293 LBS | SYSTOLIC BLOOD PRESSURE: 112 MMHG | DIASTOLIC BLOOD PRESSURE: 74 MMHG | BODY MASS INDEX: 46.99 KG/M2

## 2022-05-31 DIAGNOSIS — O24.414 INSULIN CONTROLLED GESTATIONAL DIABETES MELLITUS (GDM) IN THIRD TRIMESTER: ICD-10-CM

## 2022-05-31 DIAGNOSIS — Z3A.32 32 WEEKS GESTATION OF PREGNANCY: ICD-10-CM

## 2022-05-31 DIAGNOSIS — Z98.891 HISTORY OF C-SECTION: ICD-10-CM

## 2022-05-31 DIAGNOSIS — Z34.83 PRENATAL CARE, SUBSEQUENT PREGNANCY IN THIRD TRIMESTER: Primary | ICD-10-CM

## 2022-05-31 LAB
GLUCOSE UR STRIP-MCNC: NEGATIVE MG/DL
PROT UR STRIP-MCNC: NEGATIVE MG/DL

## 2022-05-31 PROCEDURE — 90715 TDAP VACCINE 7 YRS/> IM: CPT | Performed by: OBSTETRICS & GYNECOLOGY

## 2022-05-31 PROCEDURE — 99213 OFFICE O/P EST LOW 20 MIN: CPT | Performed by: OBSTETRICS & GYNECOLOGY

## 2022-05-31 PROCEDURE — 90471 IMMUNIZATION ADMIN: CPT | Performed by: OBSTETRICS & GYNECOLOGY

## 2022-05-31 NOTE — PROGRESS NOTES
OB follow up     Rosana Farnsworth is a 31 y.o.  32w4d being seen today for her obstetrical visit.  Patient reports no complaints. Fetal movement: normal.  She continues to see Quintin GENTRY for glucose control and ultrasound management.  She had a recent ultrasound on 2022 that showed growth at the 73rd percentile.  She is seeing them for weekly biophysical profiles.    Her prenatal care is complicated by (and status):*    Review of Systems  Cramping/contractions : Negative  Vaginal bleeding: Negative  Fetal movement normal    /74   Wt 136 kg (300 lb)   LMP 10/09/2021   BMI 46.99 kg/m²     FHT:  140s BPM   Uterine Size: size greater than dates       Assessment    Diagnoses and all orders for this visit:    1. Prenatal care, subsequent pregnancy in third trimester (Primary)    2. 32 weeks gestation of pregnancy    3. History of     4. Insulin controlled gestational diabetes mellitus (GDM) in third trimester        1) pregnancy at 32w4d         Plan    Reviewed this stage of pregnancy  Problem list updated   Follow up in 2 weeks.  She will be getting biophysical profiles weekly with maternal-fetal medicine every Friday.  I asked her to schedule the next appointment here in about 10 days which would be on a Friday to save her an extra trip in.  Presently,  scheduled for  with tubal sterilization.  I spent 20 minutes caring for Rosana on this date of service. This time includes time spent by me in the following activities: preparing for the visit, reviewing tests, obtaining and/or reviewing a separately obtained history, performing a medically appropriate examination and/or evaluation, counseling and educating the patient/family/caregiver and documenting information in the medical record      Carlos Esposito MD   2022  13:55 EDT

## 2022-06-13 ENCOUNTER — HOSPITAL ENCOUNTER (EMERGENCY)
Facility: HOSPITAL | Age: 32
Discharge: HOME | End: 2022-06-13
Attending: OBSTETRICS & GYNECOLOGY | Admitting: OBSTETRICS & GYNECOLOGY

## 2022-06-13 VITALS
HEIGHT: 67 IN | HEART RATE: 102 BPM | TEMPERATURE: 98.5 F | SYSTOLIC BLOOD PRESSURE: 138 MMHG | DIASTOLIC BLOOD PRESSURE: 77 MMHG | BODY MASS INDEX: 45.99 KG/M2 | RESPIRATION RATE: 16 BRPM | OXYGEN SATURATION: 98 % | WEIGHT: 293 LBS

## 2022-06-13 LAB
BACTERIA UR QL AUTO: ABNORMAL /HPF
BILIRUB UR QL STRIP: NEGATIVE
CLARITY UR: ABNORMAL
COLOR UR: YELLOW
GLUCOSE UR STRIP-MCNC: NEGATIVE MG/DL
HGB UR QL STRIP.AUTO: NEGATIVE
HYALINE CASTS UR QL AUTO: ABNORMAL /LPF
KETONES UR QL STRIP: NEGATIVE
LEUKOCYTE ESTERASE UR QL STRIP.AUTO: ABNORMAL
NITRITE UR QL STRIP: NEGATIVE
PH UR STRIP.AUTO: 8.5 [PH] (ref 5–8)
PROT UR QL STRIP: ABNORMAL
RBC # UR STRIP: ABNORMAL /HPF
REF LAB TEST METHOD: ABNORMAL
SP GR UR STRIP: 1.02 (ref 1–1.03)
SQUAMOUS #/AREA URNS HPF: ABNORMAL /HPF
UROBILINOGEN UR QL STRIP: ABNORMAL
WBC # UR STRIP: ABNORMAL /HPF

## 2022-06-13 PROCEDURE — 81001 URINALYSIS AUTO W/SCOPE: CPT | Performed by: OBSTETRICS & GYNECOLOGY

## 2022-06-13 PROCEDURE — 59025 FETAL NON-STRESS TEST: CPT

## 2022-06-13 PROCEDURE — 99284 EMERGENCY DEPT VISIT MOD MDM: CPT | Performed by: OBSTETRICS & GYNECOLOGY

## 2022-06-13 PROCEDURE — 87086 URINE CULTURE/COLONY COUNT: CPT | Performed by: OBSTETRICS & GYNECOLOGY

## 2022-06-13 RX ORDER — ACETAMINOPHEN 500 MG
1000 TABLET ORAL ONCE
Status: COMPLETED | OUTPATIENT
Start: 2022-06-13 | End: 2022-06-13

## 2022-06-13 RX ADMIN — ACETAMINOPHEN 1000 MG: 500 TABLET ORAL at 16:01

## 2022-06-13 NOTE — OBED NOTES
[facility] Note [facility]        Patient Name: Rosana Farnsworth  YOB: 1990  MRN: [id number]  Admission Date: 2022  2:33 PM  Date of Service: 2022    Chief Complaint: Abdominal Pain ([facility]. Patient having left sided abdominal pain left of midline, sharp, constant, started yesterday, worse when moving around.  Better when laying on left side.  Also is not feeling baby move as much as normal since last night.  GDM, poly.  No LOF, no VB.)        Subjective     Rosana Farnsworth is a 31 y.o. female  at 34w3d with Estimated Date of Delivery: 22 who presents with the chief complaint listed above.  She sees Carlos Esposito MD for her prenatal care. Her pregnancy has been complicated by:  obesity, history of CVA, asthma, insulin dependent diabetes, polyhydramnios.    Patient reports pain to left of umbilicus over uterus that feels like 'uterus is ripping.'  She denies fever, chills, contractions, vaginal discharge, or vaginal bleeding.  She reports pain worsened with movement and is tender to touch.      She describes fetal movement as decreased.  She denies rupture of membranes.  She denies vaginal bleeding. She is not feeling contractions.          Objective   Patient Active Problem List    Diagnosis    • Cerebrovascular accident (CVA) ([facility]) [I63.9]    • Pneumonia of both lungs due to infectious organism [J18.9]    • Asthma [J45.909]    • Class 3 severe obesity due to excess calories in adult ([facility]) [E66.01]    • Migraine without aura [G43.009]         OB History    Para Term  AB Living   3 2 1 1 0 2   SAB IAB Ectopic Molar Multiple Live Births   0 0 0 0 0 2      # Outcome Date GA Lbr Mil/2nd Weight Sex Delivery Anes PTL Lv   3 Current            2 Term 20 37w3d  3110 g (6 lb 13.7 oz) M CS-LTranv Spinal  VARGHESE      Name: LAURYN FARNSWORTH      Apgar1: 9  Apgar5: 9   1  17 36w5d  3990 g (8 lb 12.7 oz) M CS-LTranv   VARGHESE      Name: LAURYN FARNSWORTH      Apgar1:  8  Apgar5: 9      Obstetric Comments   7.5 weeks pregnant         Past Medical History:   Diagnosis Date   • Asthma    • Gestational diabetes     insulin   • Polyhydramnios    • Stroke ([facility])        Past Surgical History:   Procedure Laterality Date   •  SECTION     • CHOLECYSTECTOMY         No current facility-administered medications on file prior to encounter.     Current Outpatient Medications on File Prior to Encounter   Medication Sig Dispense Refill   • calcium carbonate (OS-HARVEY) 1250 (500 Ca) MG chewable tablet Chew 1 tablet Daily.     • Calcium Carbonate-Vit D-Min (CALCIUM 1200 PO) Take  by mouth.     • famotidine (PEPCID) 20 MG tablet Take 20 mg by mouth.     • ferrous sulfate 325 (65 FE) MG tablet Take 325 mg by mouth Daily With Breakfast.     • insulin aspart (novoLOG FLEXPEN) 100 UNIT/ML solution pen-injector sc pen 1:10 carb ratio at meals and correction scale > 120, max TDD 80 units.     • Insulin Aspart FlexPen 100 UNIT/ML solution pen-injector      • Lancets (freestyle) lancets Test  each 12   • Lantus SoloStar 100 UNIT/ML injection pen      • Prenatal Vit-Fe Fumarate-FA (PRENATAL, CLASSIC, VITAMIN) 28-0.8 MG tablet tablet Take  by mouth daily.     • sertraline (ZOLOFT) 100 MG tablet 200 mg Daily.     • albuterol (PROVENTIL) (2.5 MG/3ML) 0.083% nebulizer solution Take 2.5 mg by nebulization Every 4 (Four) Hours As Needed for Wheezing. 3 mL 3   • BD Pen Needle Molly 2nd Gen 32G X 4 MM misc      • EPINEPHrine (EPIPEN) 0.3 MG/0.3ML solution auto-injector injection Inject 0.3 mg into the appropriate muscle as directed by prescriber.     • Glucose Blood (Blood Glucose Test) strip 1 each by In Vitro route 4 (Four) Times a Day. Test fasting and 2 hours after meals 120 each 3   • glucose monitor monitoring kit 1 each 4 (Four) Times a Day. 1 each 0   • L-Methylfolate-Algae 7.5-90.314 MG capsule Take 1 tablet by mouth Daily.     • ondansetron ODT (Zofran ODT) 4 MG disintegrating tablet Place 1  "tablet on the tongue Every 8 (Eight) Hours As Needed for Nausea or Vomiting. 30 tablet 3       Allergies   Allergen Reactions   • [place] Anaphylaxis and Shortness Of Breath   • Nuts Anaphylaxis   • Amoxicillin    • Morphine Other (See Comments)     headache   • Nsaids Unknown - Low Severity     No allergy; just cant have due to gastric sleeve   • Other      Tree nut    • Penicillins    • Sulfa Antibiotics Hives   • Insulin Lispro Hives and Itching       No family history on file.    Social History     Socioeconomic History   • Marital status: Single   Tobacco Use   • Smoking status: Former Smoker   Substance and Sexual Activity   • Alcohol use: No   • Sexual activity: Yes     Partners: Male     Birth control/protection: None           Review of Systems   Constitutional: Negative for chills, fatigue and fever.   HENT: Negative for congestion, rhinorrhea and sore throat.    Eyes: Negative for visual disturbance.   Respiratory: Negative.    Cardiovascular: Negative.    Gastrointestinal: Positive for abdominal pain. Negative for constipation, diarrhea, nausea and vomiting.   Genitourinary: Negative for difficulty urinating, dyspareunia, dysuria, flank pain, frequency, genital sores, hematuria, pelvic pain, urgency, vaginal bleeding, vaginal discharge and vaginal pain.   Neurological: Negative for dizziness, seizures, light-headedness and headaches.   Psychiatric/Behavioral: Negative for sleep disturbance. The patient is not nervous/anxious.           PHYSICAL EXAM:      VITAL SIGNS:  Vitals:    06/13/22 1450   BP: 138/77   BP Location: Right arm   Patient Position: Lying   Pulse: 102   Resp: 16   Temp: 98.5 °F (36.9 °C)   TempSrc: Oral   SpO2: 98%   Height: 170.2 cm (67\")        FHT'S:                   Baseline:  130 BPM  Variability:  Moderate = 6 - 25 BPM  Accelerations:  15 x 15 accelerations present     Decelerations:  absent  Contractions:   absent     Interpretation:    Reactive NST, CAT 1 tracing        PHYSICAL " EXAM:    General: well developed; well nourished  no acute distress   Heart: Not performed.   Lungs  : breathing is unlabored     Abdomen: Soft, TTP over right portion of uterus.  Uterus soft.  Nontender in RLQ, LLQ, and RUQ       Cervix: was checked (by me): 0 cm / 1 % / -3  Cervical Dilation (cm): 0  Cervical Position: posterior   Contractions: none        Extremities: peripheral pulses normal, no pedal edema, no clubbing or cyanosis      LABS AND TESTING ORDERED:  1. Uterine and fetal monitoring  2. Urinalysis      LAB RESULTS:    Recent Results (from the past 24 hour(s))   Urinalysis With Culture If Indicated - Urine, Clean Catch    Collection Time: 22  3:08 PM    Specimen: Urine, Clean Catch   Result Value Ref Range    Color, UA Yellow Yellow, Straw    Appearance, UA Cloudy (A) Clear    pH, UA 8.5 (H) 5.0 - 8.0    Specific Gravity, UA 1.018 1.005 - 1.030    Glucose, UA Negative Negative    Ketones, UA Negative Negative    Bilirubin, UA Negative Negative    Blood, UA Negative Negative    Protein, UA Trace (A) Negative    Leuk Esterase, UA Moderate (2+) (A) Negative    Nitrite, UA Negative Negative    Urobilinogen, UA 1.0 E.U./dL 0.2 - 1.0 E.U./dL   Urinalysis, Microscopic Only - Urine, Clean Catch    Collection Time: 22  3:08 PM    Specimen: Urine, Clean Catch   Result Value Ref Range    RBC, UA 0-2 None Seen, 0-2 /HPF    WBC, UA 21-30 (A) None Seen, 0-2 /HPF    Bacteria, UA 2+ (A) None Seen /HPF    Squamous Epithelial Cells, UA 13-20 (A) None Seen, 0-2 /HPF    Hyaline Casts, UA 3-6 None Seen /LPF    Methodology Automated Microscopy        Lab Results   Component Value Date    ABO O 2021    RH Positive 2021       No results found for: STREPGPB              Assessment & Plan     ASSESSMENT/PLAN:  Rosana Farnsworth is a 31 y.o. female  at 34w3d who presented with: mild tenderness to uterus.  No evidence of  labor, placental abruption, or chorioamnionitis.  Pain to light  "palpation and with movement points to musculoskeletal etiology as likely source.  Patient given tylenol and counseled to rest and hydrate.  She was discharged and given return precautions.          Final Impression:  • Pregnancy at 34w3d  • Reactive NST.  CAT 1 tracing  • Abdominal pain in pregnancy due to musculoskeletal source  • Maternal vital signs were reviewed and were unremarkable              Vitals:    06/13/22 1450   BP: 138/77   BP Location: Right arm   Patient Position: Lying   Pulse: 102   Resp: 16   Temp: 98.5 °F (36.9 °C)   TempSrc: Oral   SpO2: 98%   Height: 170.2 cm (67\")   •     • No results found for: STREPGPB  Lab Results   Component Value Date    ABO O 11/22/2021    RH Positive 11/22/2021   •   • COVID - 19 status unknown        PLAN:       I have spent 30 minutes including face to face time with the patient, greater than 50% in discussion of the diagnosis (counseling) and/or coordination of care.     Debora Stahl MD  6/13/2022  15:36 EDT  OB Hospitalist  Phone:  [phone]  "

## 2022-06-14 ENCOUNTER — ROUTINE PRENATAL (OUTPATIENT)
Dept: OBSTETRICS AND GYNECOLOGY | Facility: CLINIC | Age: 32
End: 2022-06-14

## 2022-06-14 VITALS — WEIGHT: 293 LBS | DIASTOLIC BLOOD PRESSURE: 72 MMHG | SYSTOLIC BLOOD PRESSURE: 138 MMHG | BODY MASS INDEX: 47.77 KG/M2

## 2022-06-14 DIAGNOSIS — O40.3XX0 POLYHYDRAMNIOS IN THIRD TRIMESTER COMPLICATION, SINGLE OR UNSPECIFIED FETUS: ICD-10-CM

## 2022-06-14 DIAGNOSIS — Z3A.34 34 WEEKS GESTATION OF PREGNANCY: ICD-10-CM

## 2022-06-14 DIAGNOSIS — O24.414 INSULIN CONTROLLED GESTATIONAL DIABETES MELLITUS (GDM) IN THIRD TRIMESTER: ICD-10-CM

## 2022-06-14 DIAGNOSIS — Z98.891 HISTORY OF C-SECTION: ICD-10-CM

## 2022-06-14 DIAGNOSIS — Z34.83 PRENATAL CARE, SUBSEQUENT PREGNANCY IN THIRD TRIMESTER: Primary | ICD-10-CM

## 2022-06-14 LAB
GLUCOSE UR STRIP-MCNC: NEGATIVE MG/DL
PROT UR STRIP-MCNC: NEGATIVE MG/DL

## 2022-06-14 PROCEDURE — 99214 OFFICE O/P EST MOD 30 MIN: CPT | Performed by: OBSTETRICS & GYNECOLOGY

## 2022-06-14 NOTE — PROGRESS NOTES
OB follow up     Rosana Farnsworth is a 31 y.o.  34w4d being seen today for her obstetrical visit.  Patient reports occasional contractions. Fetal movement: normal.  She is having increasing discomfort.  Her last ultrasound on 6/10/2022 with maternal-fetal medicine showed an CRISTOBAL of 36.  She sees them again this Friday and we will start doing twice-weekly testing.  She states her glucose control is a bit improved.    Her prenatal care is complicated by (and status): Prior  and A2 gestational diabetes    Review of Systems  Cramping/contractions : Present  Vaginal bleeding: Negative  Fetal movement normal    /72   Wt (!) 138 kg (305 lb)   LMP 10/09/2021   BMI 47.77 kg/m²     FHT:  140s BPM   Uterine Size: size greater than dates       Assessment    Problems Addressed this Visit    None     Visit Diagnoses     Prenatal care, subsequent pregnancy in third trimester    -  Primary    34 weeks gestation of pregnancy        History of         Insulin controlled gestational diabetes mellitus (GDM) in third trimester          Diagnoses       Codes Comments    Prenatal care, subsequent pregnancy in third trimester    -  Primary ICD-10-CM: Z34.83  ICD-9-CM: V22.1     34 weeks gestation of pregnancy     ICD-10-CM: Z3A.34  ICD-9-CM: V22.2     History of      ICD-10-CM: Z98.891  ICD-9-CM: V45.89     Insulin controlled gestational diabetes mellitus (GDM) in third trimester     ICD-10-CM: O24.414  ICD-9-CM: 648.83           1) pregnancy at 34w4d         Plan    Reviewed this stage of pregnancy  Problem list updated   Follow up in 1 weeks.  BPP weekly here in addition to biophysical profile weekly with maternal-fetal medicine until delivery.  Increasing chance of  delivery with polyhydramnios noted and discussed with the patient.  Presently, plans are for delivery at 38 weeks.    I spent 20 minutes caring for Rosana on this date of service. This time includes time spent by me in the  following activities: preparing for the visit, reviewing tests, obtaining and/or reviewing a separately obtained history, performing a medically appropriate examination and/or evaluation, ordering medications, tests, or procedures and documenting information in the medical record      Carlos Esposito MD   6/14/2022  16:23 EDT

## 2022-06-15 LAB — BACTERIA SPEC AEROBE CULT: NORMAL

## 2022-06-21 ENCOUNTER — ROUTINE PRENATAL (OUTPATIENT)
Dept: OBSTETRICS AND GYNECOLOGY | Facility: CLINIC | Age: 32
End: 2022-06-21

## 2022-06-21 VITALS — WEIGHT: 293 LBS | SYSTOLIC BLOOD PRESSURE: 130 MMHG | DIASTOLIC BLOOD PRESSURE: 80 MMHG | BODY MASS INDEX: 47.8 KG/M2

## 2022-06-21 DIAGNOSIS — O40.3XX0 POLYHYDRAMNIOS IN THIRD TRIMESTER COMPLICATION, SINGLE OR UNSPECIFIED FETUS: ICD-10-CM

## 2022-06-21 DIAGNOSIS — Z3A.35 35 WEEKS GESTATION OF PREGNANCY: Primary | ICD-10-CM

## 2022-06-21 DIAGNOSIS — Z34.83 PRENATAL CARE, SUBSEQUENT PREGNANCY IN THIRD TRIMESTER: ICD-10-CM

## 2022-06-21 DIAGNOSIS — O24.414 INSULIN CONTROLLED GESTATIONAL DIABETES MELLITUS (GDM) IN THIRD TRIMESTER: ICD-10-CM

## 2022-06-21 DIAGNOSIS — Z98.891 HISTORY OF C-SECTION: ICD-10-CM

## 2022-06-21 LAB
GLUCOSE UR STRIP-MCNC: NEGATIVE MG/DL
PROT UR STRIP-MCNC: NEGATIVE MG/DL

## 2022-06-21 PROCEDURE — 99213 OFFICE O/P EST LOW 20 MIN: CPT | Performed by: OBSTETRICS & GYNECOLOGY

## 2022-06-21 NOTE — PROGRESS NOTES
OB follow up     Rosana Farnsworth is a 31 y.o.  35w4d being seen today for her obstetrical visit.  Patient reports backache. Fetal movement: normal.  Ultrasound today shows biophysical profile of 8/8 with normal movement and polyhydramnios with an CRISTOBAL of 30 cm.    Her prenatal care is complicated by (and status): Prior  and class A2 gestational diabetes.    Review of Systems  Cramping/contractions : Occasional  Vaginal bleeding: None  Fetal movement normal    /80   Wt (!) 138 kg (305 lb 3.2 oz)   LMP 10/09/2021   BMI 47.80 kg/m²     FHT:  140s BPM   Uterine Size: size equals dates       Assessment    Problems Addressed this Visit    None     Visit Diagnoses     35 weeks gestation of pregnancy    -  Primary    Relevant Orders    Group B Streptococcus Culture - Swab, Vaginal/Rectum    Prenatal care, subsequent pregnancy in third trimester        History of         Insulin controlled gestational diabetes mellitus (GDM) in third trimester        Polyhydramnios in third trimester complication, single or unspecified fetus          Diagnoses       Codes Comments    35 weeks gestation of pregnancy    -  Primary ICD-10-CM: Z3A.35  ICD-9-CM: V22.2     Prenatal care, subsequent pregnancy in third trimester     ICD-10-CM: Z34.83  ICD-9-CM: V22.1     History of      ICD-10-CM: Z98.891  ICD-9-CM: V45.89     Insulin controlled gestational diabetes mellitus (GDM) in third trimester     ICD-10-CM: O24.414  ICD-9-CM: 648.83     Polyhydramnios in third trimester complication, single or unspecified fetus     ICD-10-CM: O40.3XX0  ICD-9-CM: 657.03           1) pregnancy at 35w4d         Plan    Reviewed this stage of pregnancy  Problem list updated   Follow up in 1 weeks.  She continues close follow-up with maternal-fetal medicine at [facility] and will get BPP there in 3 days and repeat BPP here in 1 week.  Group B strep screen done today.  Plan is for delivery at 38 weeks unless other medical  indications supersede this.  I spent 20 minutes caring for Rosana on this date of service. This time includes time spent by me in the following activities: preparing for the visit, reviewing tests, obtaining and/or reviewing a separately obtained history, performing a medically appropriate examination and/or evaluation, counseling and educating the patient/family/caregiver, ordering medications, tests, or procedures and documenting information in the medical record      Carlos Esposito MD   6/21/2022  13:57 EDT

## 2022-06-25 LAB — B-HEM STREP SPEC QL CULT: NEGATIVE

## 2022-06-27 ENCOUNTER — TELEPHONE (OUTPATIENT)
Dept: OBSTETRICS AND GYNECOLOGY | Facility: CLINIC | Age: 32
End: 2022-06-27

## 2022-06-27 ENCOUNTER — ANESTHESIA (OUTPATIENT)
Dept: LABOR AND DELIVERY | Facility: HOSPITAL | Age: 32
End: 2022-06-27

## 2022-06-27 ENCOUNTER — ANESTHESIA EVENT (OUTPATIENT)
Dept: LABOR AND DELIVERY | Facility: HOSPITAL | Age: 32
End: 2022-06-27

## 2022-06-27 ENCOUNTER — HOSPITAL ENCOUNTER (INPATIENT)
Facility: HOSPITAL | Age: 32
LOS: 4 days | Discharge: HOME | End: 2022-07-01
Attending: OBSTETRICS & GYNECOLOGY | Admitting: OBSTETRICS & GYNECOLOGY

## 2022-06-27 DIAGNOSIS — Z98.891 S/P CESAREAN SECTION: Primary | ICD-10-CM

## 2022-06-27 DIAGNOSIS — Z30.2 REQUEST FOR STERILIZATION: ICD-10-CM

## 2022-06-27 PROBLEM — Z34.90 PREGNANCY: Status: ACTIVE | Noted: 2022-06-27

## 2022-06-27 LAB
ABO GROUP BLD: NORMAL
ALBUMIN SERPL-MCNC: 3 G/DL (ref 3.5–5.2)
ALBUMIN/GLOB SERPL: 1 G/DL
ALP SERPL-CCNC: 121 U/L (ref 39–117)
ALT SERPL W P-5'-P-CCNC: 13 U/L (ref 1–33)
ANION GAP SERPL CALCULATED.3IONS-SCNC: 10.7 MMOL/L (ref 5–15)
AST SERPL-CCNC: 12 U/L (ref 1–32)
ATMOSPHERIC PRESS: 754.4 MMHG
BACTERIA UR QL AUTO: ABNORMAL /HPF
BASE EXCESS BLDCOV CALC-SCNC: -1.1 MMOL/L (ref -30–30)
BDY SITE: ABNORMAL
BILIRUB SERPL-MCNC: 0.3 MG/DL (ref 0–1.2)
BILIRUB UR QL STRIP: NEGATIVE
BLD GP AB SCN SERPL QL: NEGATIVE
BUN SERPL-MCNC: 3 MG/DL (ref 6–20)
BUN/CREAT SERPL: 5.6 (ref 7–25)
CALCIUM SPEC-SCNC: 9.5 MG/DL (ref 8.6–10.5)
CHLORIDE SERPL-SCNC: 107 MMOL/L (ref 98–107)
CLARITY UR: ABNORMAL
CO2 SERPL-SCNC: 17.3 MMOL/L (ref 22–29)
COLLECT TME SMN: ABNORMAL
COLOR UR: YELLOW
CREAT SERPL-MCNC: 0.54 MG/DL (ref 0.57–1)
DEPRECATED RDW RBC AUTO: 37.7 FL (ref 37–54)
EGFRCR SERPLBLD CKD-EPI 2021: 128 ML/MIN/1.73
ERYTHROCYTE [DISTWIDTH] IN BLOOD BY AUTOMATED COUNT: 14.3 % (ref 12.3–15.4)
GAS FLOW AIRWAY: 2 LPM
GLOBULIN UR ELPH-MCNC: 3 GM/DL
GLUCOSE SERPL-MCNC: 75 MG/DL (ref 65–99)
GLUCOSE UR STRIP-MCNC: NEGATIVE MG/DL
HCO3 BLDCOV-SCNC: 23.8 MMOL/L
HCT VFR BLD AUTO: 30.3 % (ref 34–46.6)
HGB BLD-MCNC: 9.6 G/DL (ref 12–15.9)
HGB UR QL STRIP.AUTO: NEGATIVE
HYALINE CASTS UR QL AUTO: ABNORMAL /LPF
KETONES UR QL STRIP: NEGATIVE
LEUKOCYTE ESTERASE UR QL STRIP.AUTO: ABNORMAL
MCH RBC QN AUTO: 23.2 PG (ref 26.6–33)
MCHC RBC AUTO-ENTMCNC: 31.7 G/DL (ref 31.5–35.7)
MCV RBC AUTO: 73.2 FL (ref 79–97)
MODALITY: ABNORMAL
NITRITE UR QL STRIP: NEGATIVE
NOTE: ABNORMAL
PCO2 BLDCOV: 39.6 MM HG (ref 35–51.3)
PH BLDCOV: 7.39 PH UNITS (ref 7.26–7.4)
PH UR STRIP.AUTO: 6.5 [PH] (ref 5–8)
PLATELET # BLD AUTO: 278 10*3/MM3 (ref 140–450)
PMV BLD AUTO: 8.9 FL (ref 6–12)
PO2 BLDCOV: 30.2 MM HG (ref 19–39)
POTASSIUM SERPL-SCNC: 4 MMOL/L (ref 3.5–5.2)
PROT SERPL-MCNC: 6 G/DL (ref 6–8.5)
PROT UR QL STRIP: NEGATIVE
RBC # BLD AUTO: 4.14 10*6/MM3 (ref 3.77–5.28)
RBC # UR STRIP: ABNORMAL /HPF
REF LAB TEST METHOD: ABNORMAL
RH BLD: POSITIVE
SAO2 % BLDCOA: 57.1 % (ref 92–99)
SAO2 % BLDCOV: ABNORMAL %
SARS-COV-2 RNA RESP QL NAA+PROBE: NOT DETECTED
SODIUM SERPL-SCNC: 135 MMOL/L (ref 136–145)
SP GR UR STRIP: 1.01 (ref 1–1.03)
SQUAMOUS #/AREA URNS HPF: ABNORMAL /HPF
T&S EXPIRATION DATE: NORMAL
UROBILINOGEN UR QL STRIP: ABNORMAL
WBC # UR STRIP: ABNORMAL /HPF
WBC NRBC COR # BLD: 8.55 10*3/MM3 (ref 3.4–10.8)

## 2022-06-27 PROCEDURE — 80053 COMPREHEN METABOLIC PANEL: CPT | Performed by: OBSTETRICS & GYNECOLOGY

## 2022-06-27 PROCEDURE — 0UB70ZZ EXCISION OF BILATERAL FALLOPIAN TUBES, OPEN APPROACH: ICD-10-PCS | Performed by: OBSTETRICS & GYNECOLOGY

## 2022-06-27 PROCEDURE — 25010000002 FENTANYL CITRATE (PF) 50 MCG/ML SOLUTION: Performed by: ANESTHESIOLOGY

## 2022-06-27 PROCEDURE — 58611 LIGATE OVIDUCT(S) ADD-ON: CPT | Performed by: OBSTETRICS & GYNECOLOGY

## 2022-06-27 PROCEDURE — 82803 BLOOD GASES ANY COMBINATION: CPT

## 2022-06-27 PROCEDURE — 25010000002 ONDANSETRON PER 1 MG: Performed by: ANESTHESIOLOGY

## 2022-06-27 PROCEDURE — 59515 CESAREAN DELIVERY: CPT | Performed by: OBSTETRICS & GYNECOLOGY

## 2022-06-27 PROCEDURE — 99202 OFFICE O/P NEW SF 15 MIN: CPT | Performed by: OBSTETRICS & GYNECOLOGY

## 2022-06-27 PROCEDURE — 81001 URINALYSIS AUTO W/SCOPE: CPT | Performed by: OBSTETRICS & GYNECOLOGY

## 2022-06-27 PROCEDURE — 86850 RBC ANTIBODY SCREEN: CPT | Performed by: OBSTETRICS & GYNECOLOGY

## 2022-06-27 PROCEDURE — 86900 BLOOD TYPING SEROLOGIC ABO: CPT | Performed by: OBSTETRICS & GYNECOLOGY

## 2022-06-27 PROCEDURE — 25010000002 HYDROMORPHONE PER 4 MG: Performed by: ANESTHESIOLOGY

## 2022-06-27 PROCEDURE — 88302 TISSUE EXAM BY PATHOLOGIST: CPT | Performed by: OBSTETRICS & GYNECOLOGY

## 2022-06-27 PROCEDURE — 25010000002 PHENYLEPHRINE 10 MG/ML SOLUTION: Performed by: ANESTHESIOLOGY

## 2022-06-27 PROCEDURE — U0003 INFECTIOUS AGENT DETECTION BY NUCLEIC ACID (DNA OR RNA); SEVERE ACUTE RESPIRATORY SYNDROME CORONAVIRUS 2 (SARS-COV-2) (CORONAVIRUS DISEASE [COVID-19]), AMPLIFIED PROBE TECHNIQUE, MAKING USE OF HIGH THROUGHPUT TECHNOLOGIES AS DESCRIBED BY CMS-2020-01-R: HCPCS | Performed by: OBSTETRICS & GYNECOLOGY

## 2022-06-27 PROCEDURE — 87086 URINE CULTURE/COLONY COUNT: CPT | Performed by: OBSTETRICS & GYNECOLOGY

## 2022-06-27 PROCEDURE — 85027 COMPLETE CBC AUTOMATED: CPT | Performed by: OBSTETRICS & GYNECOLOGY

## 2022-06-27 PROCEDURE — 25010000002 GENTAMICIN PER 80 MG: Performed by: OBSTETRICS & GYNECOLOGY

## 2022-06-27 PROCEDURE — 86901 BLOOD TYPING SEROLOGIC RH(D): CPT | Performed by: OBSTETRICS & GYNECOLOGY

## 2022-06-27 DEVICE — DEV CONTRL TISS STRATAFIX SPIRAL MNCRYL PLS PS2 3/0 30CM UD: Type: IMPLANTABLE DEVICE | Site: ABDOMEN | Status: FUNCTIONAL

## 2022-06-27 RX ORDER — ONDANSETRON 2 MG/ML
4 INJECTION INTRAMUSCULAR; INTRAVENOUS ONCE AS NEEDED
Status: COMPLETED | OUTPATIENT
Start: 2022-06-27 | End: 2022-06-27

## 2022-06-27 RX ORDER — DOCUSATE SODIUM 100 MG/1
100 CAPSULE, LIQUID FILLED ORAL 2 TIMES DAILY PRN
Status: DISCONTINUED | OUTPATIENT
Start: 2022-06-27 | End: 2022-06-28

## 2022-06-27 RX ORDER — SODIUM CHLORIDE 0.9 % (FLUSH) 0.9 %
10 SYRINGE (ML) INJECTION EVERY 12 HOURS SCHEDULED
Status: DISCONTINUED | OUTPATIENT
Start: 2022-06-27 | End: 2022-06-27

## 2022-06-27 RX ORDER — PRENATAL VIT/IRON FUM/FOLIC AC 27MG-0.8MG
1 TABLET ORAL DAILY
Status: DISCONTINUED | OUTPATIENT
Start: 2022-06-28 | End: 2022-07-01 | Stop reason: HOSPADM

## 2022-06-27 RX ORDER — CLINDAMYCIN PHOSPHATE 900 MG/50ML
900 INJECTION INTRAVENOUS ONCE
Status: COMPLETED | OUTPATIENT
Start: 2022-06-27 | End: 2022-06-27

## 2022-06-27 RX ORDER — METHYLERGONOVINE MALEATE 0.2 MG/ML
200 INJECTION INTRAVENOUS ONCE AS NEEDED
Status: DISCONTINUED | OUTPATIENT
Start: 2022-06-27 | End: 2022-06-27 | Stop reason: HOSPADM

## 2022-06-27 RX ORDER — MISOPROSTOL 200 UG/1
800 TABLET ORAL ONCE AS NEEDED
Status: DISCONTINUED | OUTPATIENT
Start: 2022-06-27 | End: 2022-06-27 | Stop reason: HOSPADM

## 2022-06-27 RX ORDER — DIPHENHYDRAMINE HCL 25 MG
25 CAPSULE ORAL EVERY 4 HOURS PRN
Status: DISCONTINUED | OUTPATIENT
Start: 2022-06-27 | End: 2022-07-01 | Stop reason: HOSPADM

## 2022-06-27 RX ORDER — PHENYLEPHRINE HYDROCHLORIDE 10 MG/ML
INJECTION INTRAVENOUS AS NEEDED
Status: DISCONTINUED | OUTPATIENT
Start: 2022-06-27 | End: 2022-06-27 | Stop reason: SURG

## 2022-06-27 RX ORDER — DIPHENHYDRAMINE HYDROCHLORIDE 50 MG/ML
25 INJECTION INTRAMUSCULAR; INTRAVENOUS EVERY 4 HOURS PRN
Status: DISCONTINUED | OUTPATIENT
Start: 2022-06-27 | End: 2022-07-01 | Stop reason: HOSPADM

## 2022-06-27 RX ORDER — HYDROMORPHONE HYDROCHLORIDE 1 MG/ML
0.5 INJECTION, SOLUTION INTRAMUSCULAR; INTRAVENOUS; SUBCUTANEOUS
Status: DISCONTINUED | OUTPATIENT
Start: 2022-06-27 | End: 2022-06-27 | Stop reason: HOSPADM

## 2022-06-27 RX ORDER — OXYTOCIN-SODIUM CHLORIDE 0.9% IV SOLN 30 UNIT/500ML 30-0.9/5 UT/ML-%
999 SOLUTION INTRAVENOUS ONCE
Status: COMPLETED | OUTPATIENT
Start: 2022-06-27 | End: 2022-06-27

## 2022-06-27 RX ORDER — SERTRALINE HYDROCHLORIDE 100 MG/1
100 TABLET, FILM COATED ORAL DAILY
Status: DISCONTINUED | OUTPATIENT
Start: 2022-06-28 | End: 2022-07-01 | Stop reason: HOSPADM

## 2022-06-27 RX ORDER — CARBOPROST TROMETHAMINE 250 UG/ML
250 INJECTION, SOLUTION INTRAMUSCULAR
Status: DISCONTINUED | OUTPATIENT
Start: 2022-06-27 | End: 2022-06-27 | Stop reason: HOSPADM

## 2022-06-27 RX ORDER — OXYCODONE HYDROCHLORIDE AND ACETAMINOPHEN 5; 325 MG/1; MG/1
1 TABLET ORAL EVERY 4 HOURS PRN
Status: DISCONTINUED | OUTPATIENT
Start: 2022-06-27 | End: 2022-06-28 | Stop reason: SDUPTHER

## 2022-06-27 RX ORDER — IBUPROFEN 600 MG/1
600 TABLET ORAL EVERY 8 HOURS PRN
Status: DISCONTINUED | OUTPATIENT
Start: 2022-06-27 | End: 2022-07-01 | Stop reason: HOSPADM

## 2022-06-27 RX ORDER — NALOXONE HCL 0.4 MG/ML
0.2 VIAL (ML) INJECTION
Status: DISCONTINUED | OUTPATIENT
Start: 2022-06-27 | End: 2022-07-01 | Stop reason: HOSPADM

## 2022-06-27 RX ORDER — TRISODIUM CITRATE DIHYDRATE AND CITRIC ACID MONOHYDRATE 500; 334 MG/5ML; MG/5ML
30 SOLUTION ORAL ONCE
Status: COMPLETED | OUTPATIENT
Start: 2022-06-27 | End: 2022-06-27

## 2022-06-27 RX ORDER — OXYTOCIN-SODIUM CHLORIDE 0.9% IV SOLN 30 UNIT/500ML 30-0.9/5 UT/ML-%
250 SOLUTION INTRAVENOUS CONTINUOUS PRN
Status: ACTIVE | OUTPATIENT
Start: 2022-06-27 | End: 2022-06-27

## 2022-06-27 RX ORDER — ACETAMINOPHEN 500 MG
1000 TABLET ORAL ONCE
Status: COMPLETED | OUTPATIENT
Start: 2022-06-27 | End: 2022-06-27

## 2022-06-27 RX ORDER — SODIUM CHLORIDE, SODIUM LACTATE, POTASSIUM CHLORIDE, CALCIUM CHLORIDE 600; 310; 30; 20 MG/100ML; MG/100ML; MG/100ML; MG/100ML
125 INJECTION, SOLUTION INTRAVENOUS CONTINUOUS
Status: DISCONTINUED | OUTPATIENT
Start: 2022-06-27 | End: 2022-07-01 | Stop reason: HOSPADM

## 2022-06-27 RX ORDER — FENTANYL CITRATE 50 UG/ML
INJECTION, SOLUTION INTRAMUSCULAR; INTRAVENOUS
Status: COMPLETED | OUTPATIENT
Start: 2022-06-27 | End: 2022-06-27

## 2022-06-27 RX ORDER — ERYTHROMYCIN 5 MG/G
OINTMENT OPHTHALMIC
Status: ACTIVE
Start: 2022-06-27 | End: 2022-06-28

## 2022-06-27 RX ORDER — BUPIVACAINE HYDROCHLORIDE 7.5 MG/ML
INJECTION, SOLUTION EPIDURAL; RETROBULBAR
Status: COMPLETED | OUTPATIENT
Start: 2022-06-27 | End: 2022-06-27

## 2022-06-27 RX ORDER — ONDANSETRON 2 MG/ML
4 INJECTION INTRAMUSCULAR; INTRAVENOUS ONCE AS NEEDED
Status: DISCONTINUED | OUTPATIENT
Start: 2022-06-27 | End: 2022-07-01 | Stop reason: HOSPADM

## 2022-06-27 RX ORDER — SIMETHICONE 80 MG
80 TABLET,CHEWABLE ORAL 4 TIMES DAILY PRN
Status: DISCONTINUED | OUTPATIENT
Start: 2022-06-27 | End: 2022-07-01 | Stop reason: HOSPADM

## 2022-06-27 RX ORDER — FAMOTIDINE 10 MG/ML
20 INJECTION, SOLUTION INTRAVENOUS ONCE AS NEEDED
Status: COMPLETED | OUTPATIENT
Start: 2022-06-27 | End: 2022-06-27

## 2022-06-27 RX ORDER — ENOXAPARIN SODIUM 100 MG/ML
40 INJECTION SUBCUTANEOUS EVERY 12 HOURS SCHEDULED
Status: DISCONTINUED | OUTPATIENT
Start: 2022-06-28 | End: 2022-07-01 | Stop reason: HOSPADM

## 2022-06-27 RX ORDER — PHYTONADIONE 1 MG/.5ML
INJECTION, EMULSION INTRAMUSCULAR; INTRAVENOUS; SUBCUTANEOUS
Status: ACTIVE
Start: 2022-06-27 | End: 2022-06-28

## 2022-06-27 RX ORDER — LIDOCAINE HYDROCHLORIDE 10 MG/ML
5 INJECTION, SOLUTION EPIDURAL; INFILTRATION; INTRACAUDAL; PERINEURAL AS NEEDED
Status: DISCONTINUED | OUTPATIENT
Start: 2022-06-27 | End: 2022-06-27

## 2022-06-27 RX ORDER — SODIUM CHLORIDE 0.9 % (FLUSH) 0.9 %
10 SYRINGE (ML) INJECTION AS NEEDED
Status: DISCONTINUED | OUTPATIENT
Start: 2022-06-27 | End: 2022-06-27

## 2022-06-27 RX ORDER — HYDROCODONE BITARTRATE AND ACETAMINOPHEN 5; 325 MG/1; MG/1
1 TABLET ORAL EVERY 4 HOURS PRN
Status: DISCONTINUED | OUTPATIENT
Start: 2022-06-27 | End: 2022-07-01 | Stop reason: HOSPADM

## 2022-06-27 RX ORDER — ONDANSETRON 4 MG/1
4 TABLET, FILM COATED ORAL EVERY 8 HOURS PRN
Status: DISCONTINUED | OUTPATIENT
Start: 2022-06-27 | End: 2022-07-01 | Stop reason: HOSPADM

## 2022-06-27 RX ORDER — OXYTOCIN-SODIUM CHLORIDE 0.9% IV SOLN 30 UNIT/500ML 30-0.9/5 UT/ML-%
125 SOLUTION INTRAVENOUS CONTINUOUS PRN
Status: COMPLETED | OUTPATIENT
Start: 2022-06-27 | End: 2022-06-27

## 2022-06-27 RX ADMIN — DOCUSATE SODIUM 100 MG: 100 CAPSULE, LIQUID FILLED ORAL at 20:36

## 2022-06-27 RX ADMIN — OXYTOCIN-SODIUM CHLORIDE 0.9% IV SOLN 30 UNIT/500ML 999 ML/HR: 30-0.9/5 SOLUTION at 16:09

## 2022-06-27 RX ADMIN — SODIUM CHLORIDE, POTASSIUM CHLORIDE, SODIUM LACTATE AND CALCIUM CHLORIDE 125 ML/HR: 600; 310; 30; 20 INJECTION, SOLUTION INTRAVENOUS at 14:41

## 2022-06-27 RX ADMIN — PHENYLEPHRINE HYDROCHLORIDE 100 MCG: 10 INJECTION INTRAVENOUS at 15:57

## 2022-06-27 RX ADMIN — BUPIVACAINE HYDROCHLORIDE 1.6 ML: 7.5 INJECTION, SOLUTION EPIDURAL; RETROBULBAR at 16:11

## 2022-06-27 RX ADMIN — PHENYLEPHRINE HYDROCHLORIDE 100 MCG: 10 INJECTION INTRAVENOUS at 16:00

## 2022-06-27 RX ADMIN — PHENYLEPHRINE HYDROCHLORIDE 100 MCG: 10 INJECTION INTRAVENOUS at 16:12

## 2022-06-27 RX ADMIN — FENTANYL CITRATE 10 MCG: 0.05 INJECTION, SOLUTION INTRAMUSCULAR; INTRAVENOUS at 16:11

## 2022-06-27 RX ADMIN — PHENYLEPHRINE HYDROCHLORIDE 100 MCG: 10 INJECTION INTRAVENOUS at 16:24

## 2022-06-27 RX ADMIN — OXYCODONE AND ACETAMINOPHEN 1 TABLET: 5; 325 TABLET ORAL at 20:36

## 2022-06-27 RX ADMIN — CLINDAMYCIN PHOSPHATE 900 MG: 900 INJECTION, SOLUTION INTRAVENOUS at 15:09

## 2022-06-27 RX ADMIN — PHENYLEPHRINE HYDROCHLORIDE 100 MCG: 10 INJECTION INTRAVENOUS at 15:53

## 2022-06-27 RX ADMIN — PHENYLEPHRINE HYDROCHLORIDE 100 MCG: 10 INJECTION INTRAVENOUS at 16:18

## 2022-06-27 RX ADMIN — PHENYLEPHRINE HYDROCHLORIDE 100 MCG: 10 INJECTION INTRAVENOUS at 16:15

## 2022-06-27 RX ADMIN — SODIUM CHLORIDE, POTASSIUM CHLORIDE, SODIUM LACTATE AND CALCIUM CHLORIDE 1000 ML: 600; 310; 30; 20 INJECTION, SOLUTION INTRAVENOUS at 13:46

## 2022-06-27 RX ADMIN — GENTAMICIN SULFATE 460 MG: 40 INJECTION, SOLUTION INTRAMUSCULAR; INTRAVENOUS at 15:41

## 2022-06-27 RX ADMIN — FAMOTIDINE 20 MG: 10 INJECTION INTRAVENOUS at 15:13

## 2022-06-27 RX ADMIN — PHENYLEPHRINE HYDROCHLORIDE 100 MCG: 10 INJECTION INTRAVENOUS at 16:02

## 2022-06-27 RX ADMIN — PHENYLEPHRINE HYDROCHLORIDE 100 MCG: 10 INJECTION INTRAVENOUS at 15:50

## 2022-06-27 RX ADMIN — PHENYLEPHRINE HYDROCHLORIDE 100 MCG: 10 INJECTION INTRAVENOUS at 16:06

## 2022-06-27 RX ADMIN — OXYTOCIN 125 ML/HR: 10 INJECTION, SOLUTION INTRAMUSCULAR; INTRAVENOUS at 17:49

## 2022-06-27 RX ADMIN — SODIUM CITRATE AND CITRIC ACID MONOHYDRATE 30 ML: 500; 334 SOLUTION ORAL at 15:35

## 2022-06-27 RX ADMIN — IBUPROFEN 600 MG: 600 TABLET ORAL at 20:36

## 2022-06-27 RX ADMIN — PHENYLEPHRINE HYDROCHLORIDE 100 MCG: 10 INJECTION INTRAVENOUS at 16:04

## 2022-06-27 RX ADMIN — PHENYLEPHRINE HYDROCHLORIDE 100 MCG: 10 INJECTION INTRAVENOUS at 16:09

## 2022-06-27 RX ADMIN — ONDANSETRON 4 MG: 2 INJECTION INTRAMUSCULAR; INTRAVENOUS at 15:14

## 2022-06-27 RX ADMIN — ACETAMINOPHEN 1000 MG: 500 TABLET ORAL at 15:13

## 2022-06-27 RX ADMIN — HYDROMORPHONE HYDROCHLORIDE 0.5 MG: 1 INJECTION, SOLUTION INTRAMUSCULAR; INTRAVENOUS; SUBCUTANEOUS at 18:55

## 2022-06-27 RX ADMIN — PHENYLEPHRINE HYDROCHLORIDE 100 MCG: 10 INJECTION INTRAVENOUS at 16:21

## 2022-06-27 NOTE — ANESTHESIA PROCEDURE NOTES
Spinal Block      Patient reassessed immediately prior to procedure    Patient location during procedure: OB  Start Time: 6/27/2022 3:45 PM  Stop Time: 6/27/2022 3:48 PM  Performed By  Anesthesiologist: Ai Rios MD  Preanesthetic Checklist  Completed: patient identified, IV checked, site marked, risks and benefits discussed, surgical consent, monitors and equipment checked, pre-op evaluation and timeout performed  Spinal Block Prep:  Patient Position:sitting  Sterile Tech:cap, gloves, sterile barriers and mask  Prep:Chloraprep  Patient Monitoring:EKG, continuous pulse oximetry and blood pressure monitoring  Spinal Block Procedure  Approach:midline  Guidance:palpation technique  Location:L4-L5  Needle Type:Ayo  Needle Gauge:25 G  Placement of Spinal needle event:cerebrospinal fluid aspirated  Paresthesia: no  Fluid Appearance:clear  Medications: fentaNYL citrate (PF) (SUBLIMAZE) injection, 10 mcg  bupivacaine PF (MARCAINE) 0.75 % injection, 1.6 mL   Post Assessment  Patient Tolerance:patient tolerated the procedure well with no apparent complications  Complications no

## 2022-06-27 NOTE — TELEPHONE ENCOUNTER
Patient is calling to relay message to provider that she is having decreased fetal movement and lower abdominal pain. Also, she states her fluid level was 40 on last Friday.     Please advise,   Thank you

## 2022-06-27 NOTE — ANESTHESIA PREPROCEDURE EVALUATION
Anesthesia Evaluation     Patient summary reviewed and Nursing notes reviewed   no history of anesthetic complications:  NPO Solid Status: > 8 hours  NPO Liquid Status: > 2 hours           Airway   Mallampati: II  TM distance: >3 FB  Neck ROM: full  Large neck circumference and Possible difficult intubation  Dental - normal exam     Pulmonary - normal exam   (+) pneumonia resolved , asthma,  Cardiovascular - normal exam  Exercise tolerance: good (4-7 METS)        Neuro/Psych  (+) CVA (2019, right sided, patient went through PT and has no residual defecits), headaches,    GI/Hepatic/Renal/Endo    (+) obesity, morbid obesity (s/p gastric sleeve),  diabetes mellitus gestational,     Musculoskeletal     Abdominal   (+) obese,    Substance History      OB/GYN    (+) Pregnant (36+3),         Other                      Anesthesia Plan    ASA 4     spinal     (Morphine allergy    Spinal    I have reviewed the patient's history with the patient and the chart, including all pertinent laboratory results and imaging. I have explained the risks of spinal anesthesia including but not limited to hypotension, PDPH, nerve injury and risk of converting to GA. Patient understands risks and agrees to proceed.    I have reviewed the patient's history and chart with the patient, including all pertinent laboratory results and imaging. I have explained the risks of anesthesia including but not limited to dental damage, corneal abrasion, nerve injury, MI, stroke, aspiration, and death. Patient has agreed to proceed.     /71   Pulse 85   Temp 36.8 °C (98.3 °F) (Oral)   Resp 16   LMP 10/09/2021   SpO2 98%   )    Anesthetic plan, risks, benefits, and alternatives have been provided, discussed and informed consent has been obtained with: patient.        CODE STATUS:

## 2022-06-27 NOTE — ANESTHESIA POSTPROCEDURE EVALUATION
Patient: Rosana Farnsworth    Procedure Summary     Date: 22 Room / Location:  [facility]  /  [facility]    Anesthesia Start: 154 Anesthesia Stop:     Procedure:  SECTION REPEAT WITH TUBAL (Bilateral Abdomen) Diagnosis:     Surgeons: Carlos Esposito MD Provider: Ai Rios MD    Anesthesia Type: spinal ASA Status: 4          Anesthesia Type: spinal    Vitals  Vitals Value Taken Time   BP 72/34 22 1734   Temp     Pulse 62 22 1740   Resp 16 22 1702   SpO2 100 % 22 1740   Vitals shown include unvalidated device data.        Post Anesthesia Care and Evaluation    Patient location during evaluation: bedside  Patient participation: complete - patient participated  Level of consciousness: awake  Pain score: 2  Pain management: adequate    Airway patency: patent  Anesthetic complications: No anesthetic complications  PONV Status: none  Cardiovascular status: acceptable  Respiratory status: acceptable  Hydration status: acceptable    Comments: /41   Pulse 70   Temp 36.8 °C (98.3 °F) (Oral)   Resp 16   LMP 10/09/2021   SpO2 100%   Breastfeeding Yes

## 2022-06-27 NOTE — TELEPHONE ENCOUNTER
Patient is calling back again and is crying due to pain, I advised patient to go to L&D.    Thank you

## 2022-06-28 LAB
ATMOSPHERIC PRESS: 753.8 MMHG
BASE EXCESS BLDCOA CALC-SCNC: -3.5 MMOL/L
BASOPHILS # BLD AUTO: 0.04 10*3/MM3 (ref 0–0.2)
BASOPHILS NFR BLD AUTO: 0.4 % (ref 0–1.5)
BDY SITE: ABNORMAL
DEPRECATED RDW RBC AUTO: 34.9 FL (ref 37–54)
EOSINOPHIL # BLD AUTO: 0.13 10*3/MM3 (ref 0–0.4)
EOSINOPHIL NFR BLD AUTO: 1.3 % (ref 0.3–6.2)
ERYTHROCYTE [DISTWIDTH] IN BLOOD BY AUTOMATED COUNT: 14.1 % (ref 12.3–15.4)
GAS FLOW AIRWAY: 2 LPM
HCO3 BLDCOA-SCNC: 23.5 MMOL/L (ref 22–28)
HCT VFR BLD AUTO: 29 % (ref 34–46.6)
HGB BLD-MCNC: 9.6 G/DL (ref 12–15.9)
LYMPHOCYTES # BLD AUTO: 2.32 10*3/MM3 (ref 0.7–3.1)
LYMPHOCYTES NFR BLD AUTO: 24 % (ref 19.6–45.3)
MCH RBC QN AUTO: 23.6 PG (ref 26.6–33)
MCHC RBC AUTO-ENTMCNC: 33.1 G/DL (ref 31.5–35.7)
MCV RBC AUTO: 71.3 FL (ref 79–97)
MODALITY: ABNORMAL
MONOCYTES # BLD AUTO: 0.47 10*3/MM3 (ref 0.1–0.9)
MONOCYTES NFR BLD AUTO: 4.9 % (ref 5–12)
NEUTROPHILS NFR BLD AUTO: 6.65 10*3/MM3 (ref 1.7–7)
NEUTROPHILS NFR BLD AUTO: 68.8 % (ref 42.7–76)
NOTE: ABNORMAL
PCO2 BLDCOA: 49.2 MMHG (ref 43–63)
PH BLDCOA: 7.29 PH UNITS (ref 7.18–7.34)
PLATELET # BLD AUTO: 250 10*3/MM3 (ref 140–450)
PMV BLD AUTO: 8.9 FL (ref 6–12)
PO2 BLDCOA: ABNORMAL MM[HG]
RBC # BLD AUTO: 4.07 10*6/MM3 (ref 3.77–5.28)
SAO2 % BLDCOA: 11.6 % (ref 92–99)
WBC NRBC COR # BLD: 9.67 10*3/MM3 (ref 3.4–10.8)

## 2022-06-28 PROCEDURE — 25010000002 KETOROLAC TROMETHAMINE PER 15 MG: Performed by: OBSTETRICS & GYNECOLOGY

## 2022-06-28 PROCEDURE — 25010000002 ENOXAPARIN PER 10 MG: Performed by: OBSTETRICS & GYNECOLOGY

## 2022-06-28 PROCEDURE — 0503F POSTPARTUM CARE VISIT: CPT | Performed by: OBSTETRICS & GYNECOLOGY

## 2022-06-28 PROCEDURE — 25010000002 HYDROMORPHONE PER 4 MG: Performed by: OBSTETRICS & GYNECOLOGY

## 2022-06-28 PROCEDURE — 85025 COMPLETE CBC W/AUTO DIFF WBC: CPT | Performed by: OBSTETRICS & GYNECOLOGY

## 2022-06-28 RX ORDER — HYDROMORPHONE HYDROCHLORIDE 1 MG/ML
0.5 INJECTION, SOLUTION INTRAMUSCULAR; INTRAVENOUS; SUBCUTANEOUS
Status: DISCONTINUED | OUTPATIENT
Start: 2022-06-28 | End: 2022-07-01 | Stop reason: HOSPADM

## 2022-06-28 RX ORDER — DOCUSATE SODIUM 100 MG/1
100 CAPSULE, LIQUID FILLED ORAL 2 TIMES DAILY
Status: DISCONTINUED | OUTPATIENT
Start: 2022-06-28 | End: 2022-07-01 | Stop reason: HOSPADM

## 2022-06-28 RX ORDER — OXYCODONE AND ACETAMINOPHEN 10; 325 MG/1; MG/1
1 TABLET ORAL EVERY 4 HOURS PRN
Status: DISCONTINUED | OUTPATIENT
Start: 2022-06-28 | End: 2022-07-01 | Stop reason: HOSPADM

## 2022-06-28 RX ORDER — KETOROLAC TROMETHAMINE 30 MG/ML
30 INJECTION, SOLUTION INTRAMUSCULAR; INTRAVENOUS EVERY 6 HOURS PRN
Status: DISCONTINUED | OUTPATIENT
Start: 2022-06-28 | End: 2022-07-01 | Stop reason: HOSPADM

## 2022-06-28 RX ADMIN — HYDROMORPHONE HYDROCHLORIDE 0.5 MG: 1 INJECTION, SOLUTION INTRAMUSCULAR; INTRAVENOUS; SUBCUTANEOUS at 17:18

## 2022-06-28 RX ADMIN — OXYCODONE AND ACETAMINOPHEN 1 TABLET: 5; 325 TABLET ORAL at 00:26

## 2022-06-28 RX ADMIN — OXYCODONE HYDROCHLORIDE AND ACETAMINOPHEN 1 TABLET: 10; 325 TABLET ORAL at 21:05

## 2022-06-28 RX ADMIN — OXYCODONE HYDROCHLORIDE AND ACETAMINOPHEN 1 TABLET: 10; 325 TABLET ORAL at 10:48

## 2022-06-28 RX ADMIN — HYDROMORPHONE HYDROCHLORIDE 0.5 MG: 1 INJECTION, SOLUTION INTRAMUSCULAR; INTRAVENOUS; SUBCUTANEOUS at 03:42

## 2022-06-28 RX ADMIN — ENOXAPARIN SODIUM 40 MG: 100 INJECTION SUBCUTANEOUS at 08:48

## 2022-06-28 RX ADMIN — SIMETHICONE 80 MG: 80 TABLET, CHEWABLE ORAL at 21:05

## 2022-06-28 RX ADMIN — Medication 1 TABLET: at 08:48

## 2022-06-28 RX ADMIN — SIMETHICONE 80 MG: 80 TABLET, CHEWABLE ORAL at 15:15

## 2022-06-28 RX ADMIN — OXYCODONE HYDROCHLORIDE AND ACETAMINOPHEN 1 TABLET: 10; 325 TABLET ORAL at 06:35

## 2022-06-28 RX ADMIN — HYDROMORPHONE HYDROCHLORIDE 0.5 MG: 1 INJECTION, SOLUTION INTRAMUSCULAR; INTRAVENOUS; SUBCUTANEOUS at 08:48

## 2022-06-28 RX ADMIN — OXYCODONE HYDROCHLORIDE AND ACETAMINOPHEN 1 TABLET: 10; 325 TABLET ORAL at 15:15

## 2022-06-28 RX ADMIN — SIMETHICONE 80 MG: 80 TABLET, CHEWABLE ORAL at 03:21

## 2022-06-28 RX ADMIN — ENOXAPARIN SODIUM 40 MG: 100 INJECTION SUBCUTANEOUS at 21:05

## 2022-06-28 RX ADMIN — SIMETHICONE 80 MG: 80 TABLET, CHEWABLE ORAL at 09:33

## 2022-06-28 RX ADMIN — DOCUSATE SODIUM 100 MG: 100 CAPSULE, LIQUID FILLED ORAL at 21:05

## 2022-06-28 RX ADMIN — SERTRALINE 100 MG: 100 TABLET, FILM COATED ORAL at 09:33

## 2022-06-28 RX ADMIN — KETOROLAC TROMETHAMINE 30 MG: 30 INJECTION, SOLUTION INTRAMUSCULAR at 10:48

## 2022-06-28 RX ADMIN — KETOROLAC TROMETHAMINE 30 MG: 30 INJECTION, SOLUTION INTRAMUSCULAR at 17:19

## 2022-06-28 RX ADMIN — KETOROLAC TROMETHAMINE 30 MG: 30 INJECTION, SOLUTION INTRAMUSCULAR at 03:42

## 2022-06-29 LAB
BACTERIA SPEC AEROBE CULT: NORMAL
GLUCOSE BLDC GLUCOMTR-MCNC: 125 MG/DL (ref 70–130)
LAB AP CASE REPORT: NORMAL
PATH REPORT.FINAL DX SPEC: NORMAL
PATH REPORT.GROSS SPEC: NORMAL

## 2022-06-29 PROCEDURE — 0503F POSTPARTUM CARE VISIT: CPT | Performed by: OBSTETRICS & GYNECOLOGY

## 2022-06-29 PROCEDURE — 25010000002 ENOXAPARIN PER 10 MG: Performed by: OBSTETRICS & GYNECOLOGY

## 2022-06-29 PROCEDURE — 82962 GLUCOSE BLOOD TEST: CPT

## 2022-06-29 PROCEDURE — 63710000001 DIPHENHYDRAMINE PER 50 MG: Performed by: ANESTHESIOLOGY

## 2022-06-29 RX ORDER — DIAPER,BRIEF,INFANT-TODD,DISP
1 EACH MISCELLANEOUS EVERY 12 HOURS SCHEDULED
Status: DISCONTINUED | OUTPATIENT
Start: 2022-06-29 | End: 2022-07-01 | Stop reason: HOSPADM

## 2022-06-29 RX ORDER — DIAPER,BRIEF,INFANT-TODD,DISP
1 EACH MISCELLANEOUS EVERY 12 HOURS SCHEDULED
Status: DISCONTINUED | OUTPATIENT
Start: 2022-06-29 | End: 2022-06-29

## 2022-06-29 RX ADMIN — OXYCODONE HYDROCHLORIDE AND ACETAMINOPHEN 1 TABLET: 10; 325 TABLET ORAL at 08:39

## 2022-06-29 RX ADMIN — OXYCODONE HYDROCHLORIDE AND ACETAMINOPHEN 1 TABLET: 10; 325 TABLET ORAL at 01:08

## 2022-06-29 RX ADMIN — IBUPROFEN 600 MG: 600 TABLET ORAL at 16:18

## 2022-06-29 RX ADMIN — OXYCODONE HYDROCHLORIDE AND ACETAMINOPHEN 1 TABLET: 10; 325 TABLET ORAL at 20:57

## 2022-06-29 RX ADMIN — IBUPROFEN 600 MG: 600 TABLET ORAL at 00:11

## 2022-06-29 RX ADMIN — MAGNESIUM HYDROXIDE 15 ML: 2400 SUSPENSION ORAL at 16:18

## 2022-06-29 RX ADMIN — IBUPROFEN 600 MG: 600 TABLET ORAL at 08:39

## 2022-06-29 RX ADMIN — SERTRALINE 100 MG: 100 TABLET, FILM COATED ORAL at 08:40

## 2022-06-29 RX ADMIN — Medication 1 APPLICATION: at 05:06

## 2022-06-29 RX ADMIN — OXYCODONE HYDROCHLORIDE AND ACETAMINOPHEN 1 TABLET: 10; 325 TABLET ORAL at 16:18

## 2022-06-29 RX ADMIN — SIMETHICONE 80 MG: 80 TABLET, CHEWABLE ORAL at 23:42

## 2022-06-29 RX ADMIN — ENOXAPARIN SODIUM 40 MG: 100 INJECTION SUBCUTANEOUS at 20:57

## 2022-06-29 RX ADMIN — IBUPROFEN 600 MG: 600 TABLET ORAL at 23:42

## 2022-06-29 RX ADMIN — OXYCODONE HYDROCHLORIDE AND ACETAMINOPHEN 1 TABLET: 10; 325 TABLET ORAL at 12:29

## 2022-06-29 RX ADMIN — ENOXAPARIN SODIUM 40 MG: 100 INJECTION SUBCUTANEOUS at 08:39

## 2022-06-29 RX ADMIN — SIMETHICONE 80 MG: 80 TABLET, CHEWABLE ORAL at 12:29

## 2022-06-29 RX ADMIN — Medication 1 TABLET: at 08:38

## 2022-06-29 RX ADMIN — SIMETHICONE 80 MG: 80 TABLET, CHEWABLE ORAL at 18:38

## 2022-06-29 RX ADMIN — SIMETHICONE 80 MG: 80 TABLET, CHEWABLE ORAL at 05:06

## 2022-06-29 RX ADMIN — MAGNESIUM HYDROXIDE 15 ML: 2400 SUSPENSION ORAL at 23:42

## 2022-06-29 RX ADMIN — MAGNESIUM HYDROXIDE 15 ML: 2400 SUSPENSION ORAL at 10:53

## 2022-06-29 RX ADMIN — HYDROCORTISONE 1 APPLICATION: 1 CREAM TOPICAL at 15:28

## 2022-06-29 RX ADMIN — DOCUSATE SODIUM 100 MG: 100 CAPSULE, LIQUID FILLED ORAL at 08:39

## 2022-06-29 RX ADMIN — DOCUSATE SODIUM 100 MG: 100 CAPSULE, LIQUID FILLED ORAL at 20:57

## 2022-06-29 RX ADMIN — DIPHENHYDRAMINE HYDROCHLORIDE 25 MG: 25 CAPSULE ORAL at 21:06

## 2022-06-29 RX ADMIN — OXYCODONE HYDROCHLORIDE AND ACETAMINOPHEN 1 TABLET: 10; 325 TABLET ORAL at 05:06

## 2022-06-30 PROBLEM — Z98.891 S/P CESAREAN SECTION: Status: ACTIVE | Noted: 2022-06-30

## 2022-06-30 PROCEDURE — 25010000002 ENOXAPARIN PER 10 MG: Performed by: OBSTETRICS & GYNECOLOGY

## 2022-06-30 PROCEDURE — 0503F POSTPARTUM CARE VISIT: CPT | Performed by: OBSTETRICS & GYNECOLOGY

## 2022-06-30 RX ORDER — CALCIUM CARBONATE 200(500)MG
1 TABLET,CHEWABLE ORAL 3 TIMES DAILY PRN
Status: DISCONTINUED | OUTPATIENT
Start: 2022-06-30 | End: 2022-07-01 | Stop reason: HOSPADM

## 2022-06-30 RX ORDER — FERROUS SULFATE 325(65) MG
325 TABLET ORAL
Status: DISCONTINUED | OUTPATIENT
Start: 2022-06-30 | End: 2022-07-01 | Stop reason: HOSPADM

## 2022-06-30 RX ADMIN — ENOXAPARIN SODIUM 40 MG: 100 INJECTION SUBCUTANEOUS at 09:27

## 2022-06-30 RX ADMIN — ENOXAPARIN SODIUM 40 MG: 100 INJECTION SUBCUTANEOUS at 21:35

## 2022-06-30 RX ADMIN — Medication 1 TABLET: at 09:27

## 2022-06-30 RX ADMIN — FERROUS SULFATE TAB 325 MG (65 MG ELEMENTAL FE) 325 MG: 325 (65 FE) TAB at 10:23

## 2022-06-30 RX ADMIN — DOCUSATE SODIUM 100 MG: 100 CAPSULE, LIQUID FILLED ORAL at 09:27

## 2022-06-30 RX ADMIN — OXYCODONE HYDROCHLORIDE AND ACETAMINOPHEN 1 TABLET: 10; 325 TABLET ORAL at 06:04

## 2022-06-30 RX ADMIN — DOCUSATE SODIUM 100 MG: 100 CAPSULE, LIQUID FILLED ORAL at 21:35

## 2022-06-30 RX ADMIN — HYDROCORTISONE 1 APPLICATION: 1 CREAM TOPICAL at 02:56

## 2022-06-30 RX ADMIN — HYDROCORTISONE 1 APPLICATION: 1 CREAM TOPICAL at 21:00

## 2022-06-30 RX ADMIN — SERTRALINE 100 MG: 100 TABLET, FILM COATED ORAL at 09:30

## 2022-06-30 RX ADMIN — OXYCODONE HYDROCHLORIDE AND ACETAMINOPHEN 1 TABLET: 10; 325 TABLET ORAL at 15:07

## 2022-06-30 RX ADMIN — ANTACID TABLETS 1 TABLET: 500 TABLET, CHEWABLE ORAL at 21:35

## 2022-06-30 RX ADMIN — OXYCODONE HYDROCHLORIDE AND ACETAMINOPHEN 1 TABLET: 10; 325 TABLET ORAL at 10:23

## 2022-06-30 RX ADMIN — ANTACID TABLETS 1 TABLET: 500 TABLET, CHEWABLE ORAL at 15:07

## 2022-06-30 RX ADMIN — OXYCODONE HYDROCHLORIDE AND ACETAMINOPHEN 1 TABLET: 10; 325 TABLET ORAL at 23:04

## 2022-06-30 RX ADMIN — HYDROCORTISONE 1 APPLICATION: 1 CREAM TOPICAL at 09:28

## 2022-06-30 RX ADMIN — IBUPROFEN 600 MG: 600 TABLET ORAL at 18:52

## 2022-06-30 RX ADMIN — OXYCODONE HYDROCHLORIDE AND ACETAMINOPHEN 1 TABLET: 10; 325 TABLET ORAL at 01:10

## 2022-06-30 RX ADMIN — IBUPROFEN 600 MG: 600 TABLET ORAL at 09:27

## 2022-06-30 RX ADMIN — OXYCODONE HYDROCHLORIDE AND ACETAMINOPHEN 1 TABLET: 10; 325 TABLET ORAL at 18:52

## 2022-07-01 ENCOUNTER — TELEPHONE (OUTPATIENT)
Dept: OBSTETRICS AND GYNECOLOGY | Facility: CLINIC | Age: 32
End: 2022-07-01

## 2022-07-01 VITALS
HEART RATE: 75 BPM | OXYGEN SATURATION: 99 % | TEMPERATURE: 98 F | DIASTOLIC BLOOD PRESSURE: 80 MMHG | SYSTOLIC BLOOD PRESSURE: 114 MMHG | RESPIRATION RATE: 18 BRPM

## 2022-07-01 PROCEDURE — 25010000002 ENOXAPARIN PER 10 MG: Performed by: OBSTETRICS & GYNECOLOGY

## 2022-07-01 PROCEDURE — 0503F POSTPARTUM CARE VISIT: CPT | Performed by: OBSTETRICS & GYNECOLOGY

## 2022-07-01 RX ORDER — OXYCODONE HYDROCHLORIDE AND ACETAMINOPHEN 5; 325 MG/1; MG/1
1 TABLET ORAL EVERY 6 HOURS PRN
Qty: 20 TABLET | Refills: 0 | Status: SHIPPED | OUTPATIENT
Start: 2022-07-01

## 2022-07-01 RX ORDER — PSEUDOEPHEDRINE HCL 30 MG
100 TABLET ORAL 2 TIMES DAILY
Qty: 60 CAPSULE | Refills: 0 | Status: SHIPPED | OUTPATIENT
Start: 2022-07-01

## 2022-07-01 RX ORDER — OXYCODONE HYDROCHLORIDE AND ACETAMINOPHEN 5; 325 MG/1; MG/1
TABLET ORAL
Qty: 20 TABLET | Refills: 0 | Status: SHIPPED | OUTPATIENT
Start: 2022-07-01 | End: 2022-07-01 | Stop reason: SDUPTHER

## 2022-07-01 RX ORDER — IBUPROFEN 600 MG/1
600 TABLET ORAL EVERY 8 HOURS PRN
Qty: 90 TABLET | Refills: 1 | Status: SHIPPED | OUTPATIENT
Start: 2022-07-01

## 2022-07-01 RX ADMIN — ANTACID TABLETS 1 TABLET: 500 TABLET, CHEWABLE ORAL at 08:13

## 2022-07-01 RX ADMIN — IBUPROFEN 600 MG: 600 TABLET ORAL at 03:08

## 2022-07-01 RX ADMIN — FERROUS SULFATE TAB 325 MG (65 MG ELEMENTAL FE) 325 MG: 325 (65 FE) TAB at 08:00

## 2022-07-01 RX ADMIN — OXYCODONE HYDROCHLORIDE AND ACETAMINOPHEN 1 TABLET: 10; 325 TABLET ORAL at 08:00

## 2022-07-01 RX ADMIN — DOCUSATE SODIUM 100 MG: 100 CAPSULE, LIQUID FILLED ORAL at 08:00

## 2022-07-01 RX ADMIN — HYDROCORTISONE 1 APPLICATION: 1 CREAM TOPICAL at 08:01

## 2022-07-01 RX ADMIN — SERTRALINE 100 MG: 100 TABLET, FILM COATED ORAL at 08:00

## 2022-07-01 RX ADMIN — OXYCODONE HYDROCHLORIDE AND ACETAMINOPHEN 1 TABLET: 10; 325 TABLET ORAL at 12:59

## 2022-07-01 RX ADMIN — OXYCODONE HYDROCHLORIDE AND ACETAMINOPHEN 1 TABLET: 10; 325 TABLET ORAL at 03:08

## 2022-07-01 RX ADMIN — ENOXAPARIN SODIUM 40 MG: 100 INJECTION SUBCUTANEOUS at 08:00

## 2022-07-01 RX ADMIN — Medication 1 TABLET: at 08:00

## 2022-07-01 RX ADMIN — IBUPROFEN 600 MG: 600 TABLET ORAL at 12:59

## 2022-07-01 NOTE — LACTATION NOTE
Patient reports baby is latching some and she is pumping up to 2 oz of breast milk. She is giving baby all breast milk per bottle as supplement. Pt denies questions or needing assistance at this time. Educated on baby's expected output and weight gain. Pt has \[facility] [place] [facility]\"" info if needing f/u    Lactation Consult Note    Evaluation Completed: 2022 10:28 EDT  Patient Name: Rosana Farnsworth  :  1990  MRN:  [id number]     REFERRAL  INFORMATION:                                         DELIVERY HISTORY:        Skin to skin initiation date/time:      Skin to skin end date/time:           MATERNAL ASSESSMENT:                               INFANT ASSESSMENT:  Information for the patient's :  Rosana FarnsworthHali [[id number]]   No past medical history on file.                                                                                                     MATERNAL INFANT FEEDING:                                                                       EQUIPMENT TYPE:                                 BREAST PUMPING:          LACTATION REFERRALS:

## 2022-07-01 NOTE — TELEPHONE ENCOUNTER
Patient's pharmacy is calling to ask provider to put in the frequency on order for Rx - oxyCODONE-acetaminophen (Percocet) 5-325 MG per tablet [5940]     If possible by provider. Pharmacy needs new order.     Please advise,   Thank you     [id number] [place], [place] - [address] [place] [address] [address] - [phone]  - [phone] FX

## 2022-07-01 NOTE — DISCHARGE SUMMARY
"    Date of Discharge:  2022    Discharge Diagnosis: s/p repeat  section    Presenting Problem/History of Present Illness  Pregnancy [Z34.90]     Hospital Course  Patient is a 31 y.o. female presented with c/o suprapubic pain near  section incision and decreased fetal movement. Her pregnancy was complicated by GDM A2 that was managed by MFM, polyhydramnios, and morbid obesity. A repeat  section with tubal bilateral partial salpingectomy was preformed. She delivered a viable female infant weighing 6lb 6 oz. For further information surrounding this procedure please see operative note. Her postpartum course has been complicated by mild anemia, hgb 9.6. She is asymptomatic and will continue oral iron supplements. She was started on lovenox for DVT prophylaxis. , insulin has been discontinued postpartum She desires discharge home at this time. Her pain is well controlled, she is tolerating a regular diet and passing gas.     Procedures Performed  Procedure(s):   SECTION REPEAT WITH TUBAL       Consults:   Consults     No orders found from 2022 to 2022.          Pertinent Test Results: No results found for: WBC, RBC, HGB, HCT, MCV, MCH, MCHC, RDW, RDWSD, MPV, PLT, NEUTRORELPCT, LYMPHORELPCT, MONORELPCT, EOSRELPCT, BASORELPCT, AUTOIGPER, NEUTROABS, LYMPHSABS, MONOSABS, EOSABS, BASOSABS, AUTOIGNUM, NRBC        Condition on Discharge:  stable    Vital Signs  Temp:  [97.8 °F (36.6 °C)-98.4 °F (36.9 °C)] 97.8 °F (36.6 °C)  Heart Rate:  [73-88] 88  Resp:  [17-18] 17  BP: (120-129)/(81) 120/81    Physical Exam:   See Progress Note    Discharge Disposition  Home or Self Care    Discharge Medications     Discharge Medications      New Medications      Instructions Start Date   docusate sodium 100 MG capsule   100 mg, Oral, 2 Times Daily      ibuprofen 600 MG tablet  Commonly known as: ADVIL,MOTRIN   600 mg, Oral, Every 8 Hours PRN      [employer] 10\"x12' misc   1 each, " Apply externally, Daily      oxyCODONE-acetaminophen 5-325 MG per tablet  Commonly known as: Percocet   Take 1-2 tablets by mouth as needed for moderate pain         Continue These Medications      Instructions Start Date   albuterol (2.5 MG/3ML) 0.083% nebulizer solution  Commonly known as: PROVENTIL   2.5 mg, Nebulization, Every 4 Hours PRN      ProAir  (90 Base) MCG/ACT inhaler  Generic drug: albuterol sulfate HFA   INHALE TWO PUFFS BY MOUTH EVERY 4 HOURS AS NEEDED FOR WHEEZING      BD Pen Needle Molly 2nd Gen 32G X 4 MM misc  Generic drug: Insulin Pen Needle   No dose, route, or frequency recorded.      CALCIUM 1200 PO   Oral      calcium carbonate 1250 (500 Ca) MG chewable tablet  Commonly known as: OS-HARVEY   1 tablet, Oral, Daily      EPINEPHrine 0.3 MG/0.3ML solution auto-injector injection  Commonly known as: EPIPEN   0.3 mg, Intramuscular      famotidine 20 MG tablet  Commonly known as: PEPCID   20 mg, Oral      ferrous sulfate 325 (65 FE) MG tablet   325 mg, Oral, Daily With Breakfast      freestyle lancets   Test QID      glucose monitor monitoring kit   1 each, Does not apply, 4 Times Daily      L-Methylfolate-Algae 7.5-90.314 MG capsule   1 tablet, Oral, Daily      prenatal (CLASSIC) vitamin  tablet  Generic drug: prenatal vitamin   Oral, Daily      sertraline 100 MG tablet  Commonly known as: ZOLOFT   200 mg, Daily         Stop These Medications    Blood Glucose Test strip     insulin aspart 100 UNIT/ML solution pen-injector sc pen  Commonly known as: novoLOG FLEXPEN     Insulin Aspart FlexPen 100 UNIT/ML solution pen-injector     Lantus SoloStar 100 UNIT/ML injection pen  Generic drug: Insulin Glargine     ondansetron ODT 4 MG disintegrating tablet  Commonly known as: Zofran ODT            Discharge Diet:   Diet Instructions     Diet: Regular      Discharge Diet: Regular          Activity at Discharge:   Activity Instructions     Activity as Tolerated      Driving Restrictions      Type of  Restriction: Driving    Driving Restrictions: No Driving While Taking Narcotics    Pelvic Rest            Follow-up Appointments  No future appointments.  Additional Instructions for the Follow-ups that You Need to Schedule     Discharge Follow-up with Specified Provider: Vaibhav; 2 Weeks   As directed      To: Vaibhav    Follow Up: 2 Weeks               Test Results Pending at Discharge       Raul Buchanan MD  07/01/22  10:40 EDT    Time: 9:53am

## 2022-07-01 NOTE — PROGRESS NOTES
Section Progress Note    Assessment & Plan     1. Status post  section: Doing well postoperatively.   Discharge home with standard precautions and return to clinic in 2 weeks.    2.  Anemia affecting postpartum: Hgb 9.6. She is asymptomatic. Will send home with iron.     3. Maternal morbid obesity: On Lovenox for DVT prophylaxis     4. GDM A2. . Insulin discontinued after delivery.     Rh status: O+  Rubella: Immune  Gender: female      Subjective     Postpartum Day 4:  Delivery    The patient feels well. The patient denies emotional concerns. Pain is well controlled with current medications. The baby iswell.Urinary output is adequate. The patient is ambulating well. The patient is tolerating a normal diet. Patient reports passing flatus.    Objective     Vital signs in last 24 hours:  Temp:  [97.8 °F (36.6 °C)-98.4 °F (36.9 °C)] 97.8 °F (36.6 °C)  Heart Rate:  [73-88] 88  Resp:  [17-18] 17  BP: (120-129)/(81) 120/81      General:    alert, appears stated age and cooperative   CV: RRR, no m/r/g   Lungs: CTAB, no wheezes, no respiratory distress   Bowel Sounds:  active   Lochia:  appropriate   Uterine Fundus:   firm   Incision:  healing well, no significant drainage, no dehiscence, no significant erythema   DVT Evaluation:  No evidence of DVT seen on physical exam.     Lab Results   Component Value Date    WBC 9.67 2022    HGB 9.6 (L) 2022    HCT 29.0 (L) 2022    MCV 71.3 (L) 2022     2022         Tayla Miles, APRN  2022  09:35 EDT

## 2022-07-05 ENCOUNTER — POSTPARTUM VISIT (OUTPATIENT)
Dept: OBSTETRICS AND GYNECOLOGY | Facility: CLINIC | Age: 32
End: 2022-07-05

## 2022-07-05 ENCOUNTER — TELEPHONE (OUTPATIENT)
Dept: OBSTETRICS AND GYNECOLOGY | Facility: CLINIC | Age: 32
End: 2022-07-05

## 2022-07-05 VITALS
WEIGHT: 293 LBS | DIASTOLIC BLOOD PRESSURE: 62 MMHG | SYSTOLIC BLOOD PRESSURE: 110 MMHG | HEIGHT: 67 IN | BODY MASS INDEX: 45.99 KG/M2

## 2022-07-05 DIAGNOSIS — Z09 POSTOPERATIVE FOLLOW-UP: Primary | ICD-10-CM

## 2022-07-05 DIAGNOSIS — T81.41XA SUPERFICIAL INCISIONAL SURGICAL SITE INFECTION: ICD-10-CM

## 2022-07-05 PROCEDURE — 0503F POSTPARTUM CARE VISIT: CPT | Performed by: OBSTETRICS & GYNECOLOGY

## 2022-07-05 RX ORDER — CLINDAMYCIN HYDROCHLORIDE 300 MG/1
300 CAPSULE ORAL 3 TIMES DAILY
Qty: 30 CAPSULE | Refills: 0 | Status: SHIPPED | OUTPATIENT
Start: 2022-07-05 | End: 2022-07-15

## 2022-07-05 NOTE — TELEPHONE ENCOUNTER
Left message for Rosana that Dr Esposito would like to see her today, 7/5/22. Appt times are 1pm or 2:30pm. Left message for her to call and let me know. I also sent a My Chart message requesting she come in and let me know when she can come in.

## 2022-07-05 NOTE — PROGRESS NOTES
HPI  Rosana Farnsworth presents for postoperative follow up s/p repeat  about 8 days ago.. The patient has had a relatively normal postoperative course.  The patient has had no current complaints. The patient has had improving normal postoperative pain.  The patient has had no issues with the wound.   She called this morning with some irritation and minor separation at the incision site.  She denies any fever chills and no excessive pain.  The area is slightly tender to touch.        Physical Exam  General:  alert, appears stated age and cooperative  Incision:  Overall healing up appropriately but there are some areas of separation very superficially and with some superficial erythema and tenderness.  There is some surface exudate on a portion of it.  A swab was performed for MRSA.       Assessment & Plan   Assessment:    1. Postoperative follow-up    2.  Incision infection.  Will send culture and treat with clindamycin as she is allergic to sulfa.    Plan:   Continue postoperative wound care as instructed    Return in 7 to 10 days for reassessment.  I told her to keep the area very clean and clean it at least 3 times a day in between showering.  I asked her to get some Telfa pads to place on the incision when she is up and about.    Return in 2 weeks (on 2022).     Carlos Esposito MD  2022

## 2022-07-05 NOTE — TELEPHONE ENCOUNTER
----- Message from Carlos Esposito MD sent at 2022 10:55 AM EDT -----  Regarding: FW: C section incision   Please call patient and ask her to come to the office today so that I can look at her incision.  Thank you  ----- Message -----  From: Cecilia Noyola MA  Sent: 2022  10:13 AM EDT  To: Carlos Esposito MD  Subject: C section incision                               Rosana's incision has opened some, do you want her to be seen.     ----- Message -----  From: Savanna Mercado RN  Sent: 2022  10:11 AM EDT  To: Cceilia Noyola MA  Subject: C section incision                               My Chart.  22. Rosana took pictures and thinks it busted open and is infected. Please make sure Dr Esposito sees.. Thank you.    ----- Message -----  From: Rosana Farnsworth  Sent: 2022  11:44 PM EDT  [facility]  Subject: C section incision                               I'm pretty sure my c section incision is infected and there's a small section of it busted open, what do I need to do? Attaching pics of open section and dressing that I had covering it. It hurts extremely extremely bad.

## 2022-07-05 NOTE — PROGRESS NOTES
Continued Stay Note  [facility]     Patient Name: Rosana Farnsworth  MRN: [id number]  Today's Date: 7/5/2022    Admit Date: 6/27/2022     Discharge Plan     Row Name 07/05/22 0905       Plan    Plan Comments Mother: Rosana Farnsworth MRN: [id number]; Infant: Khalida Farnsworth MRN: [id number]; CSW has reviewed infant’s cord toxicology results and they were positive for Delta-9 Carboxy THC and Delta-9 THC. CSW has emailed infant’s cord toxicology results to CPS Re Swan as requested. LULI Ahmadi               Discharge Codes    No documentation.               Expected Discharge Date and Time     Expected Discharge Date Expected Discharge Time    Jul 1, 2022             JENNIFER Espinosa

## 2022-07-08 LAB
BACTERIA SPEC CULT: ABNORMAL
MICROORGANISM/AGENT SPEC: ABNORMAL
OTHER ANTIBIOTIC SUSC ISLT: ABNORMAL

## 2022-07-15 ENCOUNTER — OFFICE VISIT (OUTPATIENT)
Dept: OBSTETRICS AND GYNECOLOGY | Facility: CLINIC | Age: 32
End: 2022-07-15

## 2022-07-15 VITALS — BODY MASS INDEX: 45.89 KG/M2 | HEIGHT: 67 IN | SYSTOLIC BLOOD PRESSURE: 110 MMHG | DIASTOLIC BLOOD PRESSURE: 70 MMHG

## 2022-07-15 DIAGNOSIS — T81.49XA INFECTED INCISION: Primary | ICD-10-CM

## 2022-07-15 PROCEDURE — 0503F POSTPARTUM CARE VISIT: CPT | Performed by: OBSTETRICS & GYNECOLOGY

## 2022-07-15 NOTE — PROGRESS NOTES
"Subjective     Rosana Farnsworth is a 31 y.o. female who presents to the clinic 2 weeks status post  for Prior  in labor. Eating a regular diet without difficulty. Bowel movements are normal. The patient is not having any pain.     She was seen a little more than a week ago with  incision and some surface exudate that was cultured and grew out E. coli sensitive to Septra DS.    The following portions of the patient's history were reviewed and updated as appropriate: allergies, current medications, past family history, past medical history, past social history, past surgical history and problem list.    Review of Systems  Constitutional: negative for chills and fevers  Genitourinary:negative      Objective     /70   Ht 170.2 cm (67\")   BMI 45.89 kg/m²   General:  alert, appears stated age and cooperative   Abdomen: soft, bowel sounds active, non-tender   Incision:   no erythema, no hernia, no seroma, no swelling, small degree of surface exudate on the right side.  A small pinhole opening on the left that is draining clear to slightly orange fluid.  Hydrogen peroxide applied to the surface exudate area.  No dehiscence or separation., no dehiscence         Assessment   Superficial incision infection doing well.  There is no evidence of deeper infection or pending wound separation.   Doing well postoperatively.  Operative findings again reviewed. Pathology report discussed.      Plan     1. Continue any current medications.  Continue with normal hygiene and I recommended she apply hydrogen peroxide to the right surface exudate area at least once a day.  2. Wound care discussed.  3. Activity restrictions: Continue to increase activities.  She will continue to keep the incision covered.    4. Anticipated return to work: 2-3 weeks.  5. Follow up: 2 weeks for incision recheck.   "

## 2022-08-02 ENCOUNTER — TELEPHONE (OUTPATIENT)
Dept: OBSTETRICS AND GYNECOLOGY | Facility: CLINIC | Age: 32
End: 2022-08-02

## 2022-12-07 NOTE — ED NOTES
Nursing report ED to floor  Rosana Farnsworth  30 y.o.  female    HPI (triage note):   Chief Complaint   Patient presents with   • Neuro Deficit(s)       Admitting doctor:   Gabe Owens MD    Admitting diagnosis:   The primary encounter diagnosis was Cerebrovascular accident (CVA), unspecified mechanism (CMS/HCC). Diagnoses of Right arm weakness, Right leg weakness, and Dysarthria were also pertinent to this visit.    Code status:   Current Code Status     Date Active Code Status Order ID Comments User Context       4/7/2021 1750 CPR [id number]  Fox Chatman MD ED     Advance Care Planning Activity      Questions for Current Code Status     Question Answer Comment    Code Status CPR     Medical Interventions (Level of Support Prior to Arrest) Full     Level Of Support Discussed With Patient           Allergies:   [place], Nuts, Amoxicillin, Morphine, Nsaids, Other, Penicillins, Sulfa antibiotics, and Insulin lispro    Weight:       04/07/21  1655   Weight: 106 kg (233 lb 11 oz)       Most recent vitals:   Vitals:    04/07/21 2121 04/07/21 2140 04/07/21 2210 04/07/21 2221   BP: 107/73 106/87 113/79    Pulse: 64 62 65    Resp: 16      Temp:    98.4 °F (36.9 °C)   TempSrc:    Oral   SpO2: 96% 97% 97%    Weight:       Height:           Active LDAs/IV Access:   Lines, Drains & Airways    Active LDAs     Name:   Placement date:   Placement time:   Site:   Days:    Peripheral IV 04/07/21 1655 Right Wrist   04/07/21    1655    Wrist   less than 1    Peripheral IV 04/07/21 1742 Left Hand   04/07/21    1742    Hand   less than 1    Peripheral IV 04/07/21 1749 Right;Upper Arm   04/07/21    1749    Arm   less than 1                Labs (abnormal labs have a star):   Labs Reviewed   COVID-19,[facility] IN-HOUSE CEPHEID, NP SWAB IN TRANSPORT MEDIA 8-12 HR TAT - Abnormal; Notable for the following components:       Result Value    COVID19 Detected (*)     All other components within normal limits    Narrative:     Fact  DISPLAY PLAN FREE TEXT sheet for providers: https://www.fda.gov/media/688611/download     Fact sheet for patients: https://www.fda.gov/media/999823/download   COMPREHENSIVE METABOLIC PANEL - Abnormal; Notable for the following components:    Creatinine 0.53 (*)     Calcium 8.0 (*)     Total Protein 5.4 (*)     All other components within normal limits    Narrative:     GFR Normal >60  Chronic Kidney Disease <60  Kidney Failure <15     CBC WITH AUTO DIFFERENTIAL - Abnormal; Notable for the following components:    Hemoglobin 11.6 (*)     MCH 25.5 (*)     All other components within normal limits   PROTIME-INR - Normal   APTT - Normal   TROPONIN (IN-HOUSE) - Normal    Narrative:     Troponin T Reference Range:  <= 0.03 ng/mL-   Negative for AMI  >0.03 ng/mL-     Abnormal for myocardial necrosis.  Clinicians would have to utilize clinical acumen, EKG, Troponin and serial changes to determine if it is an Acute Myocardial Infarction or myocardial injury due to an underlying chronic condition.       Results may be falsely decreased if patient taking Biotin.     HCG, SERUM, QUALITATIVE - Normal   C-REACTIVE PROTEIN - Normal   TSH - Normal   VITAMIN B12 - Normal    Narrative:     Results may be falsely increased if patient taking Biotin.     FOLATE - Normal    Narrative:     Results may be falsely increased if patient taking Biotin.     HOMOCYSTEINE - Normal   POCT GLUCOSE FINGERSTICK - Normal   COVID PRE-OP / PRE-PROCEDURE SCREENING ORDER (NO ISOLATION)    Narrative:     The following orders were created for panel order COVID PRE-OP / PRE-PROCEDURE SCREENING ORDER (NO ISOLATION) - Swab, Nasopharynx.  Procedure                               Abnormality         Status                     ---------                               -----------         ------                     COVID-19, [facility] IN-HOUSE...[[id number]]  Abnormal            Final result                 Please view results for these tests on the individual orders.   RAINBOW DRAW     Narrative:     The following orders were created for panel order [place] Draw.  Procedure                               Abnormality         Status                     ---------                               -----------         ------                     Light Blue Top[[id number]]                                   Final result               Green Top (Gel)[[id number]]                                  Final result               Lavender Top[[id number]]                                     Final result               Gold Top - SST[[id number]]                                   Final result                 Please view results for these tests on the individual orders.   ETHANOL   URINE DRUG SCREEN   POCT GLUCOSE FINGERSTICK   POCT GLUCOSE FINGERSTICK   POCT GLUCOSE FINGERSTICK   POCT GLUCOSE FINGERSTICK   POCT GLUCOSE FINGERSTICK   POCT GLUCOSE FINGERSTICK   TYPE AND SCREEN   CBC AND DIFFERENTIAL    Narrative:     The following orders were created for panel order CBC & Differential.  Procedure                               Abnormality         Status                     ---------                               -----------         ------                     CBC Auto Differential[[id number]]        Abnormal            Final result                 Please view results for these tests on the individual orders.   LIGHT BLUE TOP   GREEN TOP   LAVENDER TOP   GOLD TOP - SST       EKG:   ECG 12 Lead   Preliminary Result   HEART RATE= 65  bpm   RR Interval= 916  ms   IA Interval= 144  ms   P Horizontal Axis= 17  deg   P Front Axis= 29  deg   QRSD Interval= 90  ms   QT Interval= 457  ms   QRS Axis= -52  deg   T Wave Axis= 35  deg   - ABNORMAL ECG -   Sinus rhythm   Left anterior fascicular block   Borderline prolonged QT interval   Electronically Signed By:    Date and Time of Study: 2021-04-07 18:13:33          Meds given in ED:   Medications   sodium chloride 0.9 % flush 10 mL (10 mL Intravenous Given 4/7/21 9047)   sodium chloride 0.9 %  infusion (has no administration in time range)   sodium chloride 0.9 % flush 10 mL (has no administration in time range)   sodium chloride 0.9 % flush 10 mL (has no administration in time range)   aspirin tablet 325 mg (has no administration in time range)     Or   aspirin suppository 300 mg (has no administration in time range)   atorvastatin (LIPITOR) tablet 80 mg (has no administration in time range)   iopamidol (ISOVUE-370) 76 % injection 150 mL (150 mL Intravenous Given by Other 4/7/21 1729)   alteplase (ACTIVASE) bolus from vial (9 mg Intravenous Given 4/7/21 1744)   alteplase (ACTIVASE) 100 mg kit (0 mg Intravenous Stopped 4/7/21 1850)   gadobenate dimeglumine (MULTIHANCE) injection 20 mL (20 mL Intravenous Given 4/7/21 2057)   prochlorperazine (COMPAZINE) injection 10 mg (10 mg Intravenous Given 4/7/21 2215)   diphenhydrAMINE (BENADRYL) injection 25 mg (25 mg Intravenous Given 4/7/21 2206)       Imaging results:  CT Head Without Contrast    Result Date: 4/7/2021   No significant change identified. Consider MRI for further evaluation.  This report was finalized on 4/7/2021 6:37 PM by Dr. Bradly Baez M.D.      CT Angiogram Neck    Result Date: 4/7/2021    1. No perfusion abnormality to suggest completed or threatened acute infarct. If indicated, MRI could be considered for further evaluation. 2. No arterial cutoff or high-grade arterial stenosis identified. 3. No acute intracranial hemorrhage or hydrocephalus. No enhancing lesions of brain.    Discussed by telephone with 1727, 1739, Rachelle Duff at 1740, 04/07/2021.  This report was finalized on 4/7/2021 5:44 PM by Dr. Bradly Baez M.D.      MRI Brain With & Without Contrast    Result Date: 4/7/2021  1. No acute intracranial abnormality, no abnormal enhancement.  This report was finalized on 4/7/2021 9:41 PM by Dr. Susan Reyes M.D.      XR Chest 1 View    Result Date: 4/7/2021  Vague density in the left base may reflect atelectasis or  infiltrate  This report was finalized on 4/7/2021 5:54 PM by Dr. Tyson Hernandez M.D.      CT Angiogram Head    Result Date: 4/7/2021    1. No perfusion abnormality to suggest completed or threatened acute infarct. If indicated, MRI could be considered for further evaluation. 2. No arterial cutoff or high-grade arterial stenosis identified. 3. No acute intracranial hemorrhage or hydrocephalus. No enhancing lesions of brain.    Discussed by telephone with 1727, 1739, Rachelle Duff at 1740, 04/07/2021.  This report was finalized on 4/7/2021 5:44 PM by Dr. Bradly Baez M.D.      CT CEREBRAL PERFUSION W WO CONTRAST W AI ANALYSIS OF LVO    Result Date: 4/7/2021    1. No perfusion abnormality to suggest completed or threatened acute infarct. If indicated, MRI could be considered for further evaluation. 2. No arterial cutoff or high-grade arterial stenosis identified. 3. No acute intracranial hemorrhage or hydrocephalus. No enhancing lesions of brain.    Discussed by telephone with 1727, 1739, Rachelle Duff at 1740, 04/07/2021.  This report was finalized on 4/7/2021 5:44 PM by Dr. Bradly Baez M.D.        Ambulatory status:   - assist x2    Social issues:   Social History     Socioeconomic History   • Marital status: Single     Spouse name: Not on file   • Number of children: Not on file   • Years of education: Not on file   • Highest education level: Not on file   Tobacco Use   • Smoking status: Former Smoker   Substance and Sexual Activity   • Alcohol use: No    Nursing report ED to floor       Rubia Daigle RN  04/07/21 6607

## 2023-09-05 NOTE — PLAN OF CARE
Problem: Adult Inpatient Plan of Care  Goal: Plan of Care Review  Outcome: Ongoing, Progressing  Flowsheets  Taken 2022 by Lashell Spencer RN  Plan of Care Reviewed With: patient  Outcome Evaluation: VSS, assessments wnl, bleeding scant, working on pumping, ambualting well, voiding ands tooling, pain well controlled w/ medication, d/c tmrw/  Taken 2022 1252 by Felicity Hernandez, RN  Progress: improving  Goal: Patient-Specific Goal (Individualized)  Outcome: Ongoing, Progressing  Goal: Absence of Hospital-Acquired Illness or Injury  Outcome: Ongoing, Progressing  Intervention: Identify and Manage Fall Risk  Recent Flowsheet Documentation  Taken 2022 by Lashell Spencer RN  Safety Promotion/Fall Prevention: safety round/check completed  Intervention: Prevent Skin Injury  Recent Flowsheet Documentation  Taken 2022 by Lashell Spencer RN  Body Position: position changed independently  Intervention: Prevent and Manage VTE (Venous Thromboembolism) Risk  Recent Flowsheet Documentation  Taken 2022 by Lashell Spencer RN  Activity Management:   activity adjusted per tolerance   up ad beena  Goal: Optimal Comfort and Wellbeing  Outcome: Ongoing, Progressing  Intervention: Provide Person-Centered Care  Recent Flowsheet Documentation  Taken 2022 by Lashell Spencer RN  Trust Relationship/Rapport:   care explained   choices provided  Goal: Readiness for Transition of Care  Outcome: Ongoing, Progressing     Problem: Adjustment to Role Transition (Postpartum  Delivery)  Goal: Successful Maternal Role Transition  Outcome: Ongoing, Progressing     Problem: Bleeding (Postpartum  Delivery)  Goal: Hemostasis  Outcome: Ongoing, Progressing     Problem: Infection (Postpartum  Delivery)  Goal: Absence of Infection Signs and Symptoms  Outcome: Ongoing, Progressing     Problem: Pain (Postpartum  Delivery)  Goal: Acceptable Pain Control  Outcome: Ongoing,  Progressing     Problem: Postoperative Nausea and Vomiting (Postpartum  Delivery)  Goal: Nausea and Vomiting Relief  Outcome: Ongoing, Progressing     Problem: Postoperative Urinary Retention (Postpartum  Delivery)  Goal: Effective Urinary Elimination  Outcome: Ongoing, Progressing     Problem:  Fall Injury Risk  Goal: Absence of Fall, Infant Drop and Related Injury  Outcome: Ongoing, Progressing  Intervention: Promote Injury-Free Environment  Recent Flowsheet Documentation  Taken 2022 by Lashell Spencer, RN  Safety Promotion/Fall Prevention: safety round/check completed     Problem: Skin Injury Risk Increased  Goal: Skin Health and Integrity  Outcome: Ongoing, Progressing  Intervention: Optimize Skin Protection  Recent Flowsheet Documentation  Taken 2022 by Lashell Spencer, RN  Head of Bed (HOB) Positioning: HOB at 60 degrees   Goal Outcome Evaluation:  Plan of Care Reviewed With: patient           Outcome Evaluation: VSS, assessments wnl, bleeding scant, working on pumping, ambualting well, voiding ands tooling, pain well controlled w/ medication, d/c tmrw/   moderate assist (50% patients effort) minimum assist (75% patients effort)/moderate assist (50% patients effort)
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
## (undated) DEVICE — SUT VIC 0 CTX 36IN J978H

## (undated) DEVICE — SLV SCD KN ADJ EXPRSS LG

## (undated) DEVICE — KT ART BLD GAS QUICK DRAW

## (undated) DEVICE — ANTIBACTERIAL UNDYED BRAIDED (POLYGLACTIN 910), SYNTHETIC ABSORBABLE SUTURE: Brand: COATED VICRYL

## (undated) DEVICE — KENDALL SCD EXPRESS SLEEVES, KNEE LENGTH, MEDIUM: Brand: KENDALL SCD

## (undated) DEVICE — RETR PANNUS PANNI ADHS 1P/U LF STRL

## (undated) DEVICE — KIWI® VACUUM DELIVERY SYSTEM, OMNICUP® WITH TRACTION FORCE INDICATOR: Brand: KIWI® OMNICUP®

## (undated) DEVICE — 3M(TM) TEGADERM(TM) TRANSPARENT FILM DRESSING FRAME STYLE 1627: Brand: 3M™ TEGADERM™

## (undated) DEVICE — SOL IRR H2O BTL 1000ML STRL

## (undated) DEVICE — GLV SURG BIOGEL LTX PF 7 1/2

## (undated) DEVICE — SUT GUT CHRM 0 CT 27IN 914H